# Patient Record
Sex: MALE | Race: WHITE | Employment: OTHER | URBAN - NONMETROPOLITAN AREA
[De-identification: names, ages, dates, MRNs, and addresses within clinical notes are randomized per-mention and may not be internally consistent; named-entity substitution may affect disease eponyms.]

---

## 2019-01-24 ENCOUNTER — APPOINTMENT (OUTPATIENT)
Dept: GENERAL RADIOLOGY | Age: 54
End: 2019-01-24
Payer: MEDICARE

## 2019-01-24 ENCOUNTER — HOSPITAL ENCOUNTER (EMERGENCY)
Age: 54
Discharge: HOME | End: 2019-01-24
Payer: MEDICARE

## 2019-01-24 VITALS
DIASTOLIC BLOOD PRESSURE: 92 MMHG | SYSTOLIC BLOOD PRESSURE: 204 MMHG | BODY MASS INDEX: 37.94 KG/M2 | TEMPERATURE: 97.4 F | RESPIRATION RATE: 16 BRPM | HEART RATE: 66 BPM | WEIGHT: 271 LBS | HEIGHT: 71 IN | OXYGEN SATURATION: 98 %
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
## 2019-01-24 DIAGNOSIS — S80.02XA CONTUSION OF LEFT KNEE, INITIAL ENCOUNTER: Primary | ICD-10-CM

## 2019-01-24 DIAGNOSIS — S81.012A LACERATION OF LEFT KNEE, INITIAL ENCOUNTER: ICD-10-CM

## 2019-01-24 DIAGNOSIS — S39.012A BACK STRAIN, INITIAL ENCOUNTER: ICD-10-CM

## 2019-01-24 PROCEDURE — 6360000002 HC RX W HCPCS: Performed by: PHYSICIAN ASSISTANT

## 2019-01-24 PROCEDURE — 90715 TDAP VACCINE 7 YRS/> IM: CPT | Performed by: PHYSICIAN ASSISTANT

## 2019-01-24 PROCEDURE — 99283 EMERGENCY DEPT VISIT LOW MDM: CPT

## 2019-01-24 PROCEDURE — 12001 RPR S/N/AX/GEN/TRNK 2.5CM/<: CPT

## 2019-01-24 PROCEDURE — 90471 IMMUNIZATION ADMIN: CPT | Performed by: PHYSICIAN ASSISTANT

## 2019-01-24 PROCEDURE — 73564 X-RAY EXAM KNEE 4 OR MORE: CPT

## 2019-01-24 RX ORDER — ORPHENADRINE CITRATE 100 MG/1
100 TABLET, EXTENDED RELEASE ORAL 2 TIMES DAILY
Qty: 14 TABLET | Refills: 0 | Status: SHIPPED | OUTPATIENT
Start: 2019-01-24 | End: 2021-07-30

## 2019-01-24 RX ORDER — LIDOCAINE HYDROCHLORIDE 10 MG/ML
INJECTION, SOLUTION INFILTRATION; PERINEURAL
Status: DISCONTINUED
Start: 2019-01-24 | End: 2019-01-24 | Stop reason: HOSPADM

## 2019-01-24 RX ORDER — GINSENG 100 MG
CAPSULE ORAL
Status: DISCONTINUED
Start: 2019-01-24 | End: 2019-01-24 | Stop reason: HOSPADM

## 2019-01-24 RX ADMIN — TETANUS TOXOID, REDUCED DIPHTHERIA TOXOID AND ACELLULAR PERTUSSIS VACCINE, ADSORBED 0.5 ML: 5; 2.5; 8; 8; 2.5 SUSPENSION INTRAMUSCULAR at 16:50

## 2019-01-24 ASSESSMENT — ENCOUNTER SYMPTOMS
ABDOMINAL PAIN: 0
WHEEZING: 0
EYE REDNESS: 0
VOMITING: 0
NAUSEA: 0
SORE THROAT: 0
SHORTNESS OF BREATH: 0
EYE DISCHARGE: 0
COUGH: 0
RHINORRHEA: 0
BACK PAIN: 1
DIARRHEA: 0

## 2019-01-24 ASSESSMENT — PAIN DESCRIPTION - LOCATION: LOCATION: BACK;KNEE

## 2019-01-24 ASSESSMENT — PAIN SCALES - GENERAL: PAINLEVEL_OUTOF10: 9

## 2019-01-24 ASSESSMENT — PAIN DESCRIPTION - ORIENTATION: ORIENTATION: LEFT

## 2019-01-24 ASSESSMENT — PAIN DESCRIPTION - PAIN TYPE: TYPE: ACUTE PAIN

## 2021-07-29 ENCOUNTER — HOSPITAL ENCOUNTER (EMERGENCY)
Age: 56
Discharge: HOME | DRG: 246 | End: 2021-07-30
Attending: EMERGENCY MEDICINE
Payer: MEDICARE

## 2021-07-29 DIAGNOSIS — K21.9 GASTROESOPHAGEAL REFLUX DISEASE, UNSPECIFIED WHETHER ESOPHAGITIS PRESENT: Primary | ICD-10-CM

## 2021-07-29 DIAGNOSIS — I16.0 HYPERTENSIVE URGENCY: ICD-10-CM

## 2021-07-29 PROBLEM — I10 ESSENTIAL HYPERTENSION: Status: ACTIVE | Noted: 2021-07-29

## 2021-07-29 PROBLEM — R12 HEARTBURN: Status: ACTIVE | Noted: 2021-07-29

## 2021-07-29 LAB
ALBUMIN SERPL-MCNC: 4.1 G/DL (ref 3.5–5.1)
ALP BLD-CCNC: 103 U/L (ref 38–126)
ALT SERPL-CCNC: 10 U/L (ref 11–66)
ANION GAP SERPL CALCULATED.3IONS-SCNC: 11 MEQ/L (ref 8–16)
AST SERPL-CCNC: 10 U/L (ref 5–40)
BASOPHILS # BLD: 0.6 %
BASOPHILS ABSOLUTE: 0.1 THOU/MM3 (ref 0–0.1)
BILIRUB SERPL-MCNC: 0.4 MG/DL (ref 0.3–1.2)
BILIRUBIN DIRECT: < 0.2 MG/DL (ref 0–0.3)
BUN BLDV-MCNC: 30 MG/DL (ref 7–22)
CALCIUM SERPL-MCNC: 9.3 MG/DL (ref 8.5–10.5)
CHLORIDE BLD-SCNC: 106 MEQ/L (ref 98–111)
CO2: 25 MEQ/L (ref 23–33)
CREAT SERPL-MCNC: 1 MG/DL (ref 0.4–1.2)
EOSINOPHIL # BLD: 7 %
EOSINOPHILS ABSOLUTE: 0.7 THOU/MM3 (ref 0–0.4)
ERYTHROCYTE [DISTWIDTH] IN BLOOD BY AUTOMATED COUNT: 13.3 % (ref 11.5–14.5)
ERYTHROCYTE [DISTWIDTH] IN BLOOD BY AUTOMATED COUNT: 43.8 FL (ref 35–45)
GFR SERPL CREATININE-BSD FRML MDRD: 77 ML/MIN/1.73M2
GLUCOSE BLD-MCNC: 166 MG/DL (ref 70–108)
HCT VFR BLD CALC: 44.2 % (ref 42–52)
HEMOGLOBIN: 14.5 GM/DL (ref 14–18)
IMMATURE GRANS (ABS): 0.03 THOU/MM3 (ref 0–0.07)
IMMATURE GRANULOCYTES: 0.3 %
LIPASE: 38 U/L (ref 5.6–51.3)
LYMPHOCYTES # BLD: 18.2 %
LYMPHOCYTES ABSOLUTE: 1.7 THOU/MM3 (ref 1–4.8)
MCH RBC QN AUTO: 29.9 PG (ref 26–33)
MCHC RBC AUTO-ENTMCNC: 32.8 GM/DL (ref 32.2–35.5)
MCV RBC AUTO: 91.1 FL (ref 80–94)
MONOCYTES # BLD: 6.7 %
MONOCYTES ABSOLUTE: 0.6 THOU/MM3 (ref 0.4–1.3)
NUCLEATED RED BLOOD CELLS: 0 /100 WBC
PLATELET # BLD: 243 THOU/MM3 (ref 130–400)
PMV BLD AUTO: 9.9 FL (ref 9.4–12.4)
POTASSIUM REFLEX MAGNESIUM: 4.4 MEQ/L (ref 3.5–5.2)
PRO-BNP: 182.5 PG/ML (ref 0–900)
RBC # BLD: 4.85 MILL/MM3 (ref 4.7–6.1)
SEG NEUTROPHILS: 67.2 %
SEGMENTED NEUTROPHILS ABSOLUTE COUNT: 6.5 THOU/MM3 (ref 1.8–7.7)
SODIUM BLD-SCNC: 142 MEQ/L (ref 135–145)
TOTAL PROTEIN: 6.5 G/DL (ref 6.1–8)
TROPONIN T: < 0.01 NG/ML
TROPONIN T: < 0.01 NG/ML
WBC # BLD: 9.6 THOU/MM3 (ref 4.8–10.8)

## 2021-07-29 PROCEDURE — 99283 EMERGENCY DEPT VISIT LOW MDM: CPT

## 2021-07-29 PROCEDURE — 2500000003 HC RX 250 WO HCPCS: Performed by: STUDENT IN AN ORGANIZED HEALTH CARE EDUCATION/TRAINING PROGRAM

## 2021-07-29 PROCEDURE — 93005 ELECTROCARDIOGRAM TRACING: CPT | Performed by: STUDENT IN AN ORGANIZED HEALTH CARE EDUCATION/TRAINING PROGRAM

## 2021-07-29 PROCEDURE — 2500000003 HC RX 250 WO HCPCS

## 2021-07-29 PROCEDURE — 83690 ASSAY OF LIPASE: CPT

## 2021-07-29 PROCEDURE — 6370000000 HC RX 637 (ALT 250 FOR IP): Performed by: STUDENT IN AN ORGANIZED HEALTH CARE EDUCATION/TRAINING PROGRAM

## 2021-07-29 PROCEDURE — 96376 TX/PRO/DX INJ SAME DRUG ADON: CPT

## 2021-07-29 PROCEDURE — 85025 COMPLETE CBC W/AUTO DIFF WBC: CPT

## 2021-07-29 PROCEDURE — 96365 THER/PROPH/DIAG IV INF INIT: CPT

## 2021-07-29 PROCEDURE — 96375 TX/PRO/DX INJ NEW DRUG ADDON: CPT

## 2021-07-29 PROCEDURE — 80048 BASIC METABOLIC PNL TOTAL CA: CPT

## 2021-07-29 PROCEDURE — 83880 ASSAY OF NATRIURETIC PEPTIDE: CPT

## 2021-07-29 PROCEDURE — 84484 ASSAY OF TROPONIN QUANT: CPT

## 2021-07-29 PROCEDURE — 36415 COLL VENOUS BLD VENIPUNCTURE: CPT

## 2021-07-29 PROCEDURE — 80076 HEPATIC FUNCTION PANEL: CPT

## 2021-07-29 RX ORDER — SUCRALFATE 1 G/1
1 TABLET ORAL 4 TIMES DAILY
Qty: 120 TABLET | Refills: 0 | Status: SHIPPED | OUTPATIENT
Start: 2021-07-29 | End: 2021-09-13

## 2021-07-29 RX ORDER — BLOOD PRESSURE TEST KIT
KIT MISCELLANEOUS
Qty: 1 KIT | Refills: 0 | Status: SHIPPED | OUTPATIENT
Start: 2021-07-29

## 2021-07-29 RX ORDER — FAMOTIDINE 20 MG/1
20 TABLET, FILM COATED ORAL ONCE
Status: COMPLETED | OUTPATIENT
Start: 2021-07-29 | End: 2021-07-29

## 2021-07-29 RX ORDER — LABETALOL 20 MG/4 ML (5 MG/ML) INTRAVENOUS SYRINGE
Status: COMPLETED
Start: 2021-07-29 | End: 2021-07-29

## 2021-07-29 RX ORDER — FAMOTIDINE 20 MG/1
20 TABLET, FILM COATED ORAL 2 TIMES DAILY PRN
Qty: 60 TABLET | Refills: 0 | Status: ON HOLD | OUTPATIENT
Start: 2021-07-29 | End: 2021-08-10 | Stop reason: SDUPTHER

## 2021-07-29 RX ORDER — OMEPRAZOLE 40 MG/1
40 CAPSULE, DELAYED RELEASE ORAL
Qty: 30 CAPSULE | Refills: 0 | Status: SHIPPED | OUTPATIENT
Start: 2021-07-29 | End: 2021-07-30

## 2021-07-29 RX ORDER — NITROGLYCERIN 20 MG/100ML
5-200 INJECTION INTRAVENOUS CONTINUOUS
Status: DISCONTINUED | OUTPATIENT
Start: 2021-07-29 | End: 2021-07-29

## 2021-07-29 RX ORDER — LABETALOL 20 MG/4 ML (5 MG/ML) INTRAVENOUS SYRINGE
10 ONCE
Status: COMPLETED | OUTPATIENT
Start: 2021-07-29 | End: 2021-07-29

## 2021-07-29 RX ORDER — LABETALOL 20 MG/4 ML (5 MG/ML) INTRAVENOUS SYRINGE
20 ONCE
Status: COMPLETED | OUTPATIENT
Start: 2021-07-30 | End: 2021-07-29

## 2021-07-29 RX ADMIN — NITROGLYCERIN 5 MCG/MIN: 20 INJECTION INTRAVENOUS at 19:53

## 2021-07-29 RX ADMIN — LABETALOL 20 MG/4 ML (5 MG/ML) INTRAVENOUS SYRINGE 10 MG: at 21:33

## 2021-07-29 RX ADMIN — FAMOTIDINE 20 MG: 20 TABLET, FILM COATED ORAL at 22:06

## 2021-07-29 RX ADMIN — LABETALOL 20 MG/4 ML (5 MG/ML) INTRAVENOUS SYRINGE 20 MG: at 23:57

## 2021-07-29 RX ADMIN — LIDOCAINE HYDROCHLORIDE: 20 SOLUTION ORAL; TOPICAL at 19:53

## 2021-07-29 ASSESSMENT — ENCOUNTER SYMPTOMS
ABDOMINAL PAIN: 1
SHORTNESS OF BREATH: 0
EYE PAIN: 0
APNEA: 0
RECTAL PAIN: 0
CHOKING: 0
CHEST TIGHTNESS: 1
PHOTOPHOBIA: 0
BLOOD IN STOOL: 0
SINUS PAIN: 0
SINUS PRESSURE: 0
COUGH: 0
ANAL BLEEDING: 0
FACIAL SWELLING: 0
EYE DISCHARGE: 0
EYE REDNESS: 0

## 2021-07-29 NOTE — ED PROVIDER NOTES
[address] [address]          Pt Name: Kelley Bloom  MRN: [id number]  [place] 1965  Date of evaluation: 7/29/2021  Treating Resident Physician: Constantino Knapp MD  Supervising Physician: Yakelin Montano MD    [address] [place] [address]       Chief Complaint   Patient presents with    Heartburn     History obtained from the patient. HISTORY OF PRESENT ILLNESS    HPI  Kelley Bloom is a 60 y.o. male who presents to the emergency department for evaluation of heartburn for the past 3 weeks but has gotten worse in the past 2 days. He states the pain gets worse after eating and when he lays down in bed. The pain is temporarily alleviated by rolaids, tums and omeprazole. He also complains of left-sided elbow pain for the past 2 days he describes the pain as sharp and denies trauma to the area. Patient's initial blood pressure in the ED is 209/104. He states that he took his antihypertensive medication today. The patient has no other acute complaints at this time. REVIEW OF SYSTEMS   Review of Systems   Constitutional: Negative for activity change, chills, diaphoresis, fatigue and fever. HENT: Negative for congestion, ear discharge, ear pain, facial swelling, hearing loss, sinus pressure and sinus pain. Eyes: Negative for photophobia, pain, discharge and redness. Respiratory: Positive for chest tightness. Negative for apnea, cough, choking and shortness of breath. Cardiovascular: Positive for chest pain. Negative for leg swelling. Gastrointestinal: Positive for abdominal pain. Negative for anal bleeding, blood in stool and rectal pain. Endocrine: Negative for polydipsia, polyphagia and polyuria. Genitourinary: Negative for dysuria, flank pain, genital sores and hematuria. Musculoskeletal: Negative for gait problem, joint swelling, myalgias, neck pain and neck stiffness. Skin: Negative for pallor, rash and wound.    Neurological: Negative for tremors, seizures, syncope, facial asymmetry and headaches. Hematological: Negative for adenopathy. Psychiatric/Behavioral: Negative for agitation, behavioral problems, hallucinations, self-injury and suicidal ideas. PAST MEDICAL AND SURGICAL HISTORY     Past Medical History:   Diagnosis Date    Diabetes mellitus ([address]     Hypertension     Pneumonia     Unspecified cerebral artery occlusion with cerebral infarction      Past Surgical History:   Procedure Laterality Date    ABDOMEN SURGERY      FEMUR FRACTURE SURGERY Left     FRACTURE SURGERY      SKIN GRAFT      TIBIA FRACTURE SURGERY Right     TOE AMPUTATION Left     2,3,4,5 TOES    VENA CAVA FILTER PLACEMENT         MEDICATIONS   No current facility-administered medications for this encounter. Current Outpatient Medications:     orphenadrine (NORFLEX) 100 MG extended release tablet, Take 1 tablet by mouth 2 times daily, Disp: 14 tablet, Rfl: 0    ibuprofen (ADVIL;MOTRIN) 800 MG tablet, Take 800 mg by mouth every 12 hours. Twice per day, Disp: , Rfl:     metFORMIN (GLUCOPHAGE) 500 MG tablet, Take 500 mg by mouth 2 times daily (with meals). , Disp: , Rfl:     glimepiride (AMARYL) 2 MG tablet, Take 2 mg by mouth every morning (before breakfast). , Disp: , Rfl:     clopidogrel (PLAVIX) 75 MG tablet, Take 75 mg by mouth daily. , Disp: , Rfl:     atenolol (TENORMIN) 50 MG tablet, Take 50 mg by mouth daily. , Disp: , Rfl:       SOCIAL HISTORY     Social History     Social History Narrative    Not on file     Social History     Tobacco Use    Smoking status: Former Smoker     Quit date: 2013     Years since quittin.9   Substance Use Topics    Alcohol use: No    Drug use: Not on file         ALLERGIES     Allergies   Allergen Reactions    Bactrim [Sulfamethoxazole-Trimethoprim] Swelling    Lasix [Furosemide] Other (See Comments)         FAMILY HISTORY     Family History   Problem Relation Age of Onset    Cancer Mother     Cancer There is no guarding or rebound. Hernia: No hernia is present. Musculoskeletal:         General: No swelling, deformity or signs of injury. Normal range of motion. Left elbow: Tenderness present. Cervical back: Normal range of motion and neck supple. No rigidity or tenderness. Right lower leg: No edema. Left lower leg: No edema. Lymphadenopathy:      Cervical: No cervical adenopathy. Skin:     General: Skin is warm and dry. Capillary Refill: Capillary refill takes less than 2 seconds. Coloration: Skin is not jaundiced. Findings: No bruising, erythema, lesion or rash. Comments: Surgical scars noted on the abdomen and bilateral arms and legs from a previous accident. Neurological:      General: No focal deficit present. Mental Status: He is alert and oriented to person, place, and time. Motor: No weakness. Psychiatric:         Mood and Affect: Mood normal.         Behavior: Behavior normal.         Thought Content: Thought content normal.       MEDICAL DECISION MAKING   Initial Assessment:   1.  Heartburn    Differential diagnosis includes but is not limited to GERD, ACS/MI, hypertensive urgency/emergency, PE    Plan:    EKG   Troponin, BNP   lipase   GI cocktail   Nitroglycerin   CBC, BMP, hepatic fxn     ED RESULTS   Laboratory results:  Labs Reviewed   CBC WITH AUTO DIFFERENTIAL - Abnormal; Notable for the following components:       Result Value    Eosinophils Absolute 0.7 (*)     All other components within normal limits   BASIC METABOLIC PANEL W/ REFLEX TO MG FOR LOW K - Abnormal; Notable for the following components:    Glucose 166 (*)     BUN 30 (*)     All other components within normal limits   HEPATIC FUNCTION PANEL - Abnormal; Notable for the following components:    ALT 10 (*)     All other components within normal limits   GLOMERULAR FILTRATION RATE, ESTIMATED - Abnormal; Notable for the following components:    Est, Glom Filt Rate 77 (*)     All other components within normal limits   LIPASE   BRAIN NATRIURETIC PEPTIDE   TROPONIN   ANION GAP       Radiologic studies results:  No orders to display       ED Medications administered this visit:   Medications   aluminum & magnesium hydroxide-simethicone (MAALOX) 30 mL, lidocaine viscous hcl (XYLOCAINE) 5 mL (GI COCKTAIL) ( Oral Given 7/29/21 1953)       ED COURSE     ED Course as of Jul 29 2102   Thu Jul 29, 2021 2001 BP(!): 214/98 [EL]   2014 BP(!): 182/94 [EL]   2044 BP(!): 180/87 [EL]   2101 Patient signed out to Dr. Syl Winn    [EL]      ED Course User Index  [EL] Ras Astudillo MD       MEDICATION CHANGES     New Prescriptions    No medications on file         FINAL DISPOSITION     Final diagnoses:   Heartburn   Essential hypertension     Condition: condition: stable  Dispo: patient signed out to Dr. Sly Winn      This transcription was electronically signed. Parts of this transcriptions may have been dictated by use of voice recognition software and electronically transcribed, and parts may have been transcribed with the assistance of an ED scribe. The transcription may contain errors not detected in proofreading. Please refer to my supervising physician's documentation if my documentation differs.     Electronically Signed: Umm Araiza MD, 07/29/21, 9:02 PM         Ras Astudillo MD  Resident  07/29/21 1924

## 2021-07-29 NOTE — ED NOTES
Upon first contact with patient this RN receives bedside shift report Forest JACKMAN.        Bret Hargrove RN  07/29/21 4174

## 2021-07-30 ENCOUNTER — OFFICE VISIT (OUTPATIENT)
Dept: CARDIOLOGY CLINIC | Age: 56
End: 2021-07-30
Payer: MEDICARE

## 2021-07-30 VITALS
HEART RATE: 64 BPM | DIASTOLIC BLOOD PRESSURE: 92 MMHG | BODY MASS INDEX: 33.93 KG/M2 | SYSTOLIC BLOOD PRESSURE: 191 MMHG | WEIGHT: 237 LBS | HEIGHT: 70 IN

## 2021-07-30 VITALS
DIASTOLIC BLOOD PRESSURE: 86 MMHG | RESPIRATION RATE: 14 BRPM | HEART RATE: 64 BPM | OXYGEN SATURATION: 95 % | TEMPERATURE: 97.9 F | SYSTOLIC BLOOD PRESSURE: 175 MMHG

## 2021-07-30 DIAGNOSIS — R07.2 PRECORDIAL PAIN: Primary | ICD-10-CM

## 2021-07-30 DIAGNOSIS — R06.02 SHORTNESS OF BREATH: ICD-10-CM

## 2021-07-30 DIAGNOSIS — I10 ESSENTIAL HYPERTENSION: ICD-10-CM

## 2021-07-30 DIAGNOSIS — E78.01 FAMILIAL HYPERCHOLESTEROLEMIA: ICD-10-CM

## 2021-07-30 PROCEDURE — 99204 OFFICE O/P NEW MOD 45 MIN: CPT | Performed by: NUCLEAR MEDICINE

## 2021-07-30 RX ORDER — LEVOFLOXACIN 750 MG/1
TABLET ORAL
Status: ON HOLD | COMMUNITY
Start: 2021-05-11 | End: 2021-08-02 | Stop reason: ALTCHOICE

## 2021-07-30 RX ORDER — PANTOPRAZOLE SODIUM 40 MG/1
40 TABLET, DELAYED RELEASE ORAL 2 TIMES DAILY
COMMUNITY
End: 2021-07-30 | Stop reason: SDUPTHER

## 2021-07-30 RX ORDER — PANTOPRAZOLE SODIUM 40 MG/1
40 TABLET, DELAYED RELEASE ORAL 2 TIMES DAILY
Qty: 180 TABLET | Refills: 1 | Status: SHIPPED | OUTPATIENT
Start: 2021-07-30 | End: 2022-04-08

## 2021-07-30 RX ORDER — LOSARTAN POTASSIUM 50 MG/1
50 TABLET ORAL DAILY
COMMUNITY
End: 2021-07-30 | Stop reason: SDUPTHER

## 2021-07-30 RX ORDER — LOSARTAN POTASSIUM 50 MG/1
50 TABLET ORAL DAILY
Qty: 90 TABLET | Refills: 1 | Status: ON HOLD | OUTPATIENT
Start: 2021-07-30 | End: 2021-08-04 | Stop reason: HOSPADM

## 2021-07-30 ASSESSMENT — ENCOUNTER SYMPTOMS
RECTAL PAIN: 0
CONSTIPATION: 0
ABDOMINAL DISTENTION: 0
ABDOMINAL PAIN: 0
SHORTNESS OF BREATH: 1
BACK PAIN: 0
CHEST TIGHTNESS: 1
VOMITING: 0
BLOOD IN STOOL: 0
DIARRHEA: 0
COLOR CHANGE: 0
ANAL BLEEDING: 0
PHOTOPHOBIA: 0
NAUSEA: 0

## 2021-07-30 NOTE — ED PROVIDER NOTES
Signed out to me at shift change. This patient has been seen and evaluated by previous shift physician, Dr. Katherine Abrams. Please refer to his/her note for detailed history of present illness, physical exam and medical decision making. Signed out time 9:09 PM. I was signed out to follow up Labs and disposition. I have personally seen and evaluated this patient at time of sign-out. Pt comes to ED with HTN and \"heart burn\". Exam: Hypertensive, otherwise unremarkable. Troponin negative x2 and Repeat EKG normal.   Labs are reassuring. No findings suggesting hypertensive emergency. Discharged with cardiology follow-up appointment set up.     VITALS  Vitals:    07/29/21 2012 07/29/21 2038 07/29/21 2136 07/29/21 2310   BP: (!) 182/94 (!) 180/87 (!) 165/95 (!) 168/92   Pulse: 64 64 66 64   Resp: 16 15 15 14   Temp:       SpO2: 96% 96% 98% 95%         LABS  Results for orders placed or performed during the hospital encounter of 07/29/21   CBC Auto Differential   Result Value Ref Range    WBC 9.6 4.8 - 10.8 thou/mm3    RBC 4.85 4.70 - 6.10 mill/mm3    Hemoglobin 14.5 14.0 - 18.0 gm/dl    Hematocrit 44.2 42.0 - 52.0 %    MCV 91.1 80.0 - 94.0 fL    MCH 29.9 26.0 - 33.0 pg    MCHC 32.8 32.2 - 35.5 gm/dl    RDW-CV 13.3 11.5 - 14.5 %    RDW-SD 43.8 35.0 - 45.0 fL    Platelets 424 225 - 479 thou/mm3    MPV 9.9 9.4 - 12.4 fL    Seg Neutrophils 67.2 %    Lymphocytes 18.2 %    Monocytes 6.7 %    Eosinophils 7.0 %    Basophils 0.6 %    Immature Granulocytes 0.3 %    Segs Absolute 6.5 1 - 7 thou/mm3    Lymphocytes Absolute 1.7 1.0 - 4.8 thou/mm3    Monocytes Absolute 0.6 0.4 - 1.3 thou/mm3    Eosinophils Absolute 0.7 (H) 0.0 - 0.4 thou/mm3    Basophils Absolute 0.1 0.0 - 0.1 thou/mm3    Immature Grans (Abs) 0.03 0.00 - 0.07 thou/mm3    nRBC 0 /100 wbc   Basic Metabolic Panel w/ Reflex to MG   Result Value Ref Range    Sodium 142 135 - 145 meq/L    Potassium reflex Magnesium 4.4 3.5 - 5.2 meq/L    Chloride 106 98 - 111 meq/L CO2 25 23 - 33 meq/L    Glucose 166 (H) 70 - 108 mg/dL    BUN 30 (H) 7 - 22 mg/dL    CREATININE 1.0 0.4 - 1.2 mg/dL    Calcium 9.3 8.5 - 10.5 mg/dL   Hepatic Function Panel   Result Value Ref Range    Albumin 4.1 3.5 - 5.1 g/dL    Total Bilirubin 0.4 0.3 - 1.2 mg/dL    Bilirubin, Direct <0.2 0.0 - 0.3 mg/dL    Alkaline Phosphatase 103 38 - 126 U/L    AST 10 5 - 40 U/L    ALT 10 (L) 11 - 66 U/L    Total Protein 6.5 6.1 - 8.0 g/dL   Lipase   Result Value Ref Range    Lipase 38.0 5.6 - 51.3 U/L   Brain Natriuretic Peptide   Result Value Ref Range    Pro-.5 0.0 - 900.0 pg/mL   Troponin   Result Value Ref Range    Troponin T < 0.010 ng/ml   Anion Gap   Result Value Ref Range    Anion Gap 11.0 8.0 - 16.0 meq/L   Glomerular Filtration Rate, Estimated   Result Value Ref Range    Est, Glom Filt Rate 77 (A) ml/min/1.73m2   Troponin   Result Value Ref Range    Troponin T < 0.010 ng/ml   EKG 12 Lead   Result Value Ref Range    Ventricular Rate 69 BPM    Atrial Rate 69 BPM    P-R Interval 176 ms    QRS Duration 94 ms    Q-T Interval 370 ms    QTc Calculation (Bazett) 396 ms    P Axis 59 degrees    R Axis 67 degrees    T Axis 50 degrees   EKG 12 Lead   Result Value Ref Range    Ventricular Rate 62 BPM    Atrial Rate 62 BPM    P-R Interval 174 ms    QRS Duration 94 ms    Q-T Interval 380 ms    QTc Calculation (Bazett) 385 ms    P Axis 35 degrees    R Axis 57 degrees    T Axis 32 degrees         RADIOLOGY  No orders to display         ED MEDICATIONS  Medications   aluminum & magnesium hydroxide-simethicone (MAALOX) 30 mL, lidocaine viscous hcl (XYLOCAINE) 5 mL (GI COCKTAIL) ( Oral Given 7/29/21 1953)   labetalol (NORMODYNE;TRANDATE) injection syringe 10 mg (10 mg Intravenous Given 7/29/21 2133)   famotidine (PEPCID) tablet 20 mg (20 mg Oral Given 7/29/21 2206)         DIAGNOSIS  1. Gastroesophageal reflux disease, unspecified whether esophagitis present    2.  Hypertensive urgency          DISPOSITION and PLAN  Home    MEDICATIONS ON DISCHARGE  New Prescriptions    BLOOD PRESSURE MONITOR KIT    Use once daily    FAMOTIDINE (PEPCID) 20 MG TABLET    Take 1 tablet by mouth 2 times daily as needed (heartburn, indigestion)    OMEPRAZOLE (PRILOSEC) 40 MG DELAYED RELEASE CAPSULE    Take 1 capsule by mouth every morning (before breakfast) Take daily, 30-45 minutes prior to eating anything.     SUCRALFATE (CARAFATE) 1 GM TABLET    Take 1 tablet by mouth 4 times daily         Chon Casiano M.D.       Chon Casiano MD  07/29/21 6860

## 2021-07-30 NOTE — PROGRESS NOTES
[id number] [facility] .  [address] [address]  Dept: [phone]  Dept Fax: [phone]  Loc: [phone]    Visit Date: 2021    Bucky Wakefield is a 60 y.o. male who presents todayfor:  Chief Complaint   Patient presents with    Follow-Up from Hospital    Hypertension    Diabetes    Hyperlipidemia     Was in the ER for heart burn   Worse with eating   Better with tums  Described as a heart burn   Arm pains as well  Above elbow   Mostly left  Does have more dyspnea  More than usual   Exertional in nature  No known CAD  Know DM for many years   Not the best controlled   Known HTN for many years   Seems to be higher lately   Used to smoke a while back   Does have family history of CAD      HPI:  HPI  Past Medical History:   Diagnosis Date    Diabetes mellitus ([address]     Hypertension     Pneumonia     Unspecified cerebral artery occlusion with cerebral infarction       Past Surgical History:   Procedure Laterality Date    ABDOMEN SURGERY      FEMUR FRACTURE SURGERY Left     FRACTURE SURGERY      SKIN GRAFT      TIBIA FRACTURE SURGERY Right     TOE AMPUTATION Left     2,3,4,5 TOES    VENA CAVA FILTER PLACEMENT       Family History   Problem Relation Age of Onset    Cancer Mother     Cancer Maternal Aunt     Cancer Maternal Grandmother      Social History     Tobacco Use    Smoking status: Former Smoker     Quit date: 2013     Years since quittin.9   Substance Use Topics    Alcohol use: No      Current Outpatient Medications   Medication Sig Dispense Refill    levoFLOXacin (LEVAQUIN) 750 MG tablet TAKE ONE TABLET EACH DAY FOR 10 DAYS TO TREAT INFECTION      omeprazole (PRILOSEC) 40 MG delayed release capsule Take 1 capsule by mouth every morning (before breakfast) Take daily, 30-45 minutes prior to eating anything.  30 capsule 0    famotidine (PEPCID) 20 MG tablet Take 1 tablet by mouth 2 times daily as needed (heartburn, indigestion) 60 tablet 0    sucralfate (CARAFATE) 1 GM tablet Take 1 tablet by mouth 4 times daily 120 tablet 0    Blood Pressure Monitor KIT Use once daily 1 kit 0    ibuprofen (ADVIL;MOTRIN) 800 MG tablet Take 800 mg by mouth every 12 hours. Twice per day      metFORMIN (GLUCOPHAGE) 500 MG tablet Take 500 mg by mouth 2 times daily (with meals).  glimepiride (AMARYL) 2 MG tablet Take 2 mg by mouth every morning (before breakfast).  clopidogrel (PLAVIX) 75 MG tablet Take 75 mg by mouth daily.  atenolol (TENORMIN) 50 MG tablet Take 50 mg by mouth daily. No current facility-administered medications for this visit. Allergies   Allergen Reactions    Bactrim [Sulfamethoxazole-Trimethoprim] Swelling    Lasix [Furosemide] Other (See Comments)     Health Maintenance   Topic Date Due    Hepatitis C screen  Never done    Pneumococcal 0-64 years Vaccine (1 of 2 - PPSV23) Never done    Diabetic foot exam  Never done    A1C test (Diabetic or Prediabetic)  Never done    Diabetic retinal exam  Never done    Lipid screen  Never done    COVID-19 Vaccine (1) Never done    HIV screen  Never done    Diabetic microalbuminuria test  Never done    Hepatitis B vaccine (1 of 3 - Risk 3-dose series) Never done    Colon cancer screen colonoscopy  Never done    Shingles Vaccine (1 of 2) Never done    Annual Wellness Visit (AWV)  Never done    Flu vaccine (1) 09/01/2021    Potassium monitoring  07/29/2022    Creatinine monitoring  07/29/2022    DTaP/Tdap/Td vaccine (2 - Td or Tdap) 01/24/2029    Hepatitis A vaccine  Aged Out    Hib vaccine  Aged Out    Meningococcal (ACWY) vaccine  Aged Out       Subjective:  Review of Systems   Constitutional: Positive for fatigue. HENT: Negative for ear pain and mouth sores. Eyes: Negative for photophobia. Respiratory: Positive for chest tightness and shortness of breath. Cardiovascular: Positive for chest pain.  Negative for palpitations. Gastrointestinal: Negative for abdominal distention, abdominal pain, anal bleeding, blood in stool, constipation, diarrhea, nausea, rectal pain and vomiting. Endocrine: Negative for polyphagia. Genitourinary: Negative for dysuria, frequency and urgency. Musculoskeletal: Negative for arthralgias, back pain, gait problem, joint swelling, myalgias, neck pain and neck stiffness. Skin: Negative for color change, pallor, rash and wound. Allergic/Immunologic: Negative for food allergies. Neurological: Negative for dizziness, syncope and light-headedness. Psychiatric/Behavioral: Negative for confusion, decreased concentration, dysphoric mood and hallucinations. The patient is not nervous/anxious and is not hyperactive. Objective:  Physical Exam  HENT:      Head: Normocephalic. Right Ear: Tympanic membrane normal.      Nose: Nose normal.      Mouth/Throat:      Mouth: Mucous membranes are moist.   Eyes:      Pupils: Pupils are equal, round, and reactive to light. Cardiovascular:      Rate and Rhythm: Normal rate. Heart sounds: Murmur heard. No gallop. Pulmonary:      Effort: No respiratory distress. Breath sounds: No stridor. No wheezing, rhonchi or rales. Chest:      Chest wall: No tenderness. Abdominal:      General: There is no distension. Palpations: There is no mass. Tenderness: There is no abdominal tenderness. There is no right CVA tenderness, left CVA tenderness, guarding or rebound. Hernia: No hernia is present. Musculoskeletal:         General: No swelling, tenderness, deformity or signs of injury. Cervical back: Normal range of motion. Right lower leg: No edema. Left lower leg: No edema. Skin:     Coloration: Skin is not jaundiced or pale. Findings: No bruising, erythema, lesion or rash. Neurological:      Mental Status: He is alert and oriented to person, place, and time.       Cranial Nerves: No cranial nerve deficit. Sensory: No sensory deficit. Motor: No weakness. Gait: Gait normal.      Deep Tendon Reflexes: Reflexes normal.   Psychiatric:         Mood and Affect: Mood normal.         Thought Content: Thought content normal.       BP (!) 191/92   Pulse 64   Ht 5' 10\" (1.778 m)   Wt 237 lb (107.5 kg)   BMI 34.01 kg/m²     Assessment:      Diagnosis Orders   1. Precordial pain     2. Essential hypertension     3. Familial hypercholesterolemia     4. Shortness of breath     as above  Possible angina   Vs GERD   Sounds like GERD than angina but a very high risk patient  Normal ECG   High     Plan:  No follow-ups on file. Start protonix 40 bid  Add carafate  Add losartan   Non invasive cardiac testing will be ordered to further evaluate for any ischemic or structural heart disease as a cause of the patient symptoms. We will proceed with a Stress Cardiolite test and echo soon. Continue risk factor modification and medical management. Thank you for allowing me to participate in the care of your patient. Please don't hesitate to contact me regarding any further issues related to the patient care      Orders Placed:  No orders of the defined types were placed in this encounter. Medications Prescribed:  No orders of the defined types were placed in this encounter. Discussed use, benefit, and side effects of prescribed medications. All patient questions answered. Pt voicedunderstanding. Instructed to continue current medications, diet and exercise. Continue risk factor modification and medical management. Patient agreed with treatment plan. Follow up as directed.     Electronically signedby Ruth Cohen MD on 7/30/2021 at 10:26 AM

## 2021-07-30 NOTE — ED NOTES
Pt updated on POC. Pt resting in bed with family at the bedside. No needs expressed.       Guicho Boswell RN  07/29/21 1001

## 2021-07-30 NOTE — ED PROVIDER NOTES
Transfer of Care Note:   Physician Signing out: Dr. Givens Duty  Receiving Physician: Jayy Cameron MD        Brief history:  59 yo M presents to ED for evaluation of heartburn, was found to have elevated BP in urgency zone despite taking home atenolol as prescribed. Items pending that need to be checked:  Blood pressure recheck. Tentative Impression of patient:  BP improving after dc nitro gtt. Uncontrolled GERD. Expected disposition of patient:  Expected discharge to home once BP better controlled. Additional Assessment and results:   I have personally performed a face to face diagnostic evaluation on this patient. The patient's initial evaluation and plan have been discussed with the prior physician who initially evaluated the patient. Nursing Notes, Past Medical Hx, Past Surgical Hx, Social Hx, Allergies, vital signs and Family Hx were all reviewed. Vitals:    07/30/21 0011   BP: (!) 175/86   Pulse:    Resp:    Temp:    SpO2:      Physical Exam - Pt appears comfortable in bed.        Labs Reviewed   CBC WITH AUTO DIFFERENTIAL - Abnormal; Notable for the following components:       Result Value    Eosinophils Absolute 0.7 (*)     All other components within normal limits   BASIC METABOLIC PANEL W/ REFLEX TO MG FOR LOW K - Abnormal; Notable for the following components:    Glucose 166 (*)     BUN 30 (*)     All other components within normal limits   HEPATIC FUNCTION PANEL - Abnormal; Notable for the following components:    ALT 10 (*)     All other components within normal limits   GLOMERULAR FILTRATION RATE, ESTIMATED - Abnormal; Notable for the following components:    Est, Glom Filt Rate 77 (*)     All other components within normal limits   LIPASE   BRAIN NATRIURETIC PEPTIDE   TROPONIN   ANION GAP   TROPONIN         Medications   aluminum & magnesium hydroxide-simethicone (MAALOX) 30 mL, lidocaine viscous hcl (XYLOCAINE) 5 mL (GI COCKTAIL) ( Oral Given 7/29/21 1953)   labetalol (NORMODYNE;TRANDATE) injection syringe 10 mg (10 mg Intravenous Given 7/29/21 2133)   famotidine (PEPCID) tablet 20 mg (20 mg Oral Given 7/29/21 2206)   labetalol (NORMODYNE;TRANDATE) injection syringe 20 mg (20 mg Intravenous Given 7/29/21 2357)         No orders to display         ED Course as of Jul 30 0021   Thu Jul 29, 2021 2001 BP(!): 214/98 [EL]   2014 BP(!): 182/94 [EL]   2044 BP(!): 180/87 [EL]   2101 Patient signed out to Dr. Mayo Mercedes    [EL]      ED Course User Index  [EL] Ede Robin MD         Further MDM and disposition:   Assessment: hypertensive urgency, GERD. Plan: labetalol IV; pepcid. Repeat trop and EKG - normal. BP improved outside of urgency zone without signs of end-organ damage on repeat evaluation. This was after he was given labetalol 10 mg IV. BP then gradually increased and pt was given labetalol 20 mg IV. BP improved to 175/86. Pt is advised to continue his home atenolol at this time. Will plan to add pepcid, omeprazole (increased dose from what pt reported), and carafate to home regimen for control of GERD. BP med management deferred to cardiology. BP cuff for home use ordered. Appt made for pt to see cardiology tomorrow morning at 10 am. GERD and HTN management counseling performed.      Final diagnoses:   Gastroesophageal reflux disease, unspecified whether esophagitis present   Hypertensive urgency     Discharge Medication List as of 7/29/2021 11:55 PM      START taking these medications    Details   omeprazole (PRILOSEC) 40 MG delayed release capsule Take 1 capsule by mouth every morning (before breakfast) Take daily, 30-45 minutes prior to eating anything., Disp-30 capsule, R-0Normal      famotidine (PEPCID) 20 MG tablet Take 1 tablet by mouth 2 times daily as needed (heartburn, indigestion), Disp-60 tablet, R-0Normal      sucralfate (CARAFATE) 1 GM tablet Take 1 tablet by mouth 4 times daily, Disp-120 tablet, R-0Normal      Blood Pressure Monitor KIT Disp-1 kit, R-0, NormalUse once daily               Condition: condition: stable  Dispo: Discharge to home    This transcription was electronically signed. It was dictated by use of voice recognition software and electronically transcribed. The transcription may contain errors not detected in proofreading.         Ezequiel Oliveria MD  Resident  07/29/21 3251       Ezequiel Oliveira MD  Resident  07/29/21 6883       Ezequiel Oliveira MD  Resident  07/30/21 6495

## 2021-07-31 LAB
EKG ATRIAL RATE: 62 BPM
EKG ATRIAL RATE: 69 BPM
EKG P AXIS: 35 DEGREES
EKG P AXIS: 59 DEGREES
EKG P-R INTERVAL: 174 MS
EKG P-R INTERVAL: 176 MS
EKG Q-T INTERVAL: 370 MS
EKG Q-T INTERVAL: 380 MS
EKG QRS DURATION: 94 MS
EKG QRS DURATION: 94 MS
EKG QTC CALCULATION (BAZETT): 385 MS
EKG QTC CALCULATION (BAZETT): 396 MS
EKG R AXIS: 57 DEGREES
EKG R AXIS: 67 DEGREES
EKG T AXIS: 32 DEGREES
EKG T AXIS: 50 DEGREES
EKG VENTRICULAR RATE: 62 BPM
EKG VENTRICULAR RATE: 69 BPM

## 2021-08-01 ENCOUNTER — HOSPITAL ENCOUNTER (INPATIENT)
Age: 56
LOS: 3 days | Discharge: HOME | DRG: 246 | End: 2021-08-04
Attending: FAMILY MEDICINE | Admitting: FAMILY MEDICINE
Payer: MEDICARE

## 2021-08-01 DIAGNOSIS — I20.0 UNSTABLE ANGINA (HCC): Primary | ICD-10-CM

## 2021-08-01 DIAGNOSIS — I25.10 CAD IN NATIVE ARTERY: ICD-10-CM

## 2021-08-01 PROBLEM — R07.9 CHEST PAIN: Status: ACTIVE | Noted: 2021-08-01

## 2021-08-01 LAB
ANION GAP SERPL CALCULATED.3IONS-SCNC: 13 MEQ/L (ref 8–16)
BUN BLDV-MCNC: 22 MG/DL (ref 7–22)
CALCIUM SERPL-MCNC: 9.3 MG/DL (ref 8.5–10.5)
CHLORIDE BLD-SCNC: 105 MEQ/L (ref 98–111)
CO2: 22 MEQ/L (ref 23–33)
CREAT SERPL-MCNC: 0.8 MG/DL (ref 0.4–1.2)
ERYTHROCYTE [DISTWIDTH] IN BLOOD BY AUTOMATED COUNT: 13.3 % (ref 11.5–14.5)
ERYTHROCYTE [DISTWIDTH] IN BLOOD BY AUTOMATED COUNT: 44.6 FL (ref 35–45)
GFR SERPL CREATININE-BSD FRML MDRD: > 90 ML/MIN/1.73M2
GLUCOSE BLD-MCNC: 109 MG/DL (ref 70–108)
GLUCOSE BLD-MCNC: 120 MG/DL (ref 70–108)
GLUCOSE BLD-MCNC: 133 MG/DL (ref 70–108)
GLUCOSE BLD-MCNC: 142 MG/DL (ref 70–108)
HCT VFR BLD CALC: 46.5 % (ref 42–52)
HEMOGLOBIN: 15 GM/DL (ref 14–18)
MCH RBC QN AUTO: 29.9 PG (ref 26–33)
MCHC RBC AUTO-ENTMCNC: 32.3 GM/DL (ref 32.2–35.5)
MCV RBC AUTO: 92.6 FL (ref 80–94)
PLATELET # BLD: 252 THOU/MM3 (ref 130–400)
PMV BLD AUTO: 9.7 FL (ref 9.4–12.4)
POTASSIUM REFLEX MAGNESIUM: 4.1 MEQ/L (ref 3.5–5.2)
RBC # BLD: 5.02 MILL/MM3 (ref 4.7–6.1)
SODIUM BLD-SCNC: 140 MEQ/L (ref 135–145)
TROPONIN T: < 0.01 NG/ML
TROPONIN T: < 0.01 NG/ML
WBC # BLD: 12.7 THOU/MM3 (ref 4.8–10.8)

## 2021-08-01 PROCEDURE — 85027 COMPLETE CBC AUTOMATED: CPT

## 2021-08-01 PROCEDURE — 99222 1ST HOSP IP/OBS MODERATE 55: CPT | Performed by: PHYSICIAN ASSISTANT

## 2021-08-01 PROCEDURE — 80048 BASIC METABOLIC PNL TOTAL CA: CPT

## 2021-08-01 PROCEDURE — 36415 COLL VENOUS BLD VENIPUNCTURE: CPT

## 2021-08-01 PROCEDURE — 6360000002 HC RX W HCPCS: Performed by: PHYSICIAN ASSISTANT

## 2021-08-01 PROCEDURE — 2500000003 HC RX 250 WO HCPCS: Performed by: PHYSICIAN ASSISTANT

## 2021-08-01 PROCEDURE — 6370000000 HC RX 637 (ALT 250 FOR IP): Performed by: PHYSICIAN ASSISTANT

## 2021-08-01 PROCEDURE — 2140000000 HC CCU INTERMEDIATE R&B

## 2021-08-01 PROCEDURE — 84484 ASSAY OF TROPONIN QUANT: CPT

## 2021-08-01 PROCEDURE — 99232 SBSQ HOSP IP/OBS MODERATE 35: CPT | Performed by: STUDENT IN AN ORGANIZED HEALTH CARE EDUCATION/TRAINING PROGRAM

## 2021-08-01 PROCEDURE — 6370000000 HC RX 637 (ALT 250 FOR IP): Performed by: STUDENT IN AN ORGANIZED HEALTH CARE EDUCATION/TRAINING PROGRAM

## 2021-08-01 PROCEDURE — 82948 REAGENT STRIP/BLOOD GLUCOSE: CPT

## 2021-08-01 PROCEDURE — 2580000003 HC RX 258: Performed by: PHYSICIAN ASSISTANT

## 2021-08-01 PROCEDURE — 99221 1ST HOSP IP/OBS SF/LOW 40: CPT | Performed by: INTERNAL MEDICINE

## 2021-08-01 RX ORDER — LOSARTAN POTASSIUM 50 MG/1
50 TABLET ORAL DAILY
Status: DISCONTINUED | OUTPATIENT
Start: 2021-08-01 | End: 2021-08-01

## 2021-08-01 RX ORDER — POTASSIUM CHLORIDE 20 MEQ/1
40 TABLET, EXTENDED RELEASE ORAL PRN
Status: DISCONTINUED | OUTPATIENT
Start: 2021-08-01 | End: 2021-08-04 | Stop reason: HOSPADM

## 2021-08-01 RX ORDER — GLIPIZIDE 5 MG/1
2.5 TABLET ORAL
Status: DISCONTINUED | OUTPATIENT
Start: 2021-08-01 | End: 2021-08-01

## 2021-08-01 RX ORDER — ASPIRIN 81 MG/1
81 TABLET, CHEWABLE ORAL DAILY
Status: DISCONTINUED | OUTPATIENT
Start: 2021-08-02 | End: 2021-08-03

## 2021-08-01 RX ORDER — SODIUM CHLORIDE 9 MG/ML
25 INJECTION, SOLUTION INTRAVENOUS PRN
Status: DISCONTINUED | OUTPATIENT
Start: 2021-08-01 | End: 2021-08-03 | Stop reason: SDUPTHER

## 2021-08-01 RX ORDER — NIFEDIPINE 30 MG/1
30 TABLET, EXTENDED RELEASE ORAL DAILY
Status: DISCONTINUED | OUTPATIENT
Start: 2021-08-01 | End: 2021-08-03

## 2021-08-01 RX ORDER — POLYETHYLENE GLYCOL 3350 17 G/17G
17 POWDER, FOR SOLUTION ORAL DAILY PRN
Status: DISCONTINUED | OUTPATIENT
Start: 2021-08-01 | End: 2021-08-04 | Stop reason: HOSPADM

## 2021-08-01 RX ORDER — CLOPIDOGREL BISULFATE 75 MG/1
75 TABLET ORAL DAILY
Status: DISCONTINUED | OUTPATIENT
Start: 2021-08-01 | End: 2021-08-03

## 2021-08-01 RX ORDER — SODIUM CHLORIDE 0.9 % (FLUSH) 0.9 %
5-40 SYRINGE (ML) INJECTION PRN
Status: DISCONTINUED | OUTPATIENT
Start: 2021-08-01 | End: 2021-08-03 | Stop reason: SDUPTHER

## 2021-08-01 RX ORDER — DEXTROSE MONOHYDRATE 25 G/50ML
12.5 INJECTION, SOLUTION INTRAVENOUS PRN
Status: DISCONTINUED | OUTPATIENT
Start: 2021-08-01 | End: 2021-08-04 | Stop reason: HOSPADM

## 2021-08-01 RX ORDER — ACETAMINOPHEN 650 MG/1
650 SUPPOSITORY RECTAL EVERY 6 HOURS PRN
Status: DISCONTINUED | OUTPATIENT
Start: 2021-08-01 | End: 2021-08-04 | Stop reason: HOSPADM

## 2021-08-01 RX ORDER — NITROGLYCERIN 20 MG/100ML
5-200 INJECTION INTRAVENOUS CONTINUOUS
Status: DISCONTINUED | OUTPATIENT
Start: 2021-08-01 | End: 2021-08-04 | Stop reason: HOSPADM

## 2021-08-01 RX ORDER — CALCIUM CARBONATE 200(500)MG
500 TABLET,CHEWABLE ORAL 3 TIMES DAILY PRN
Status: DISCONTINUED | OUTPATIENT
Start: 2021-08-01 | End: 2021-08-04 | Stop reason: HOSPADM

## 2021-08-01 RX ORDER — POTASSIUM CHLORIDE 7.45 MG/ML
10 INJECTION INTRAVENOUS PRN
Status: DISCONTINUED | OUTPATIENT
Start: 2021-08-01 | End: 2021-08-04 | Stop reason: HOSPADM

## 2021-08-01 RX ORDER — ACETAMINOPHEN 325 MG/1
650 TABLET ORAL EVERY 6 HOURS PRN
Status: DISCONTINUED | OUTPATIENT
Start: 2021-08-01 | End: 2021-08-04 | Stop reason: HOSPADM

## 2021-08-01 RX ORDER — SUCRALFATE 1 G/1
1 TABLET ORAL 4 TIMES DAILY
Status: DISCONTINUED | OUTPATIENT
Start: 2021-08-01 | End: 2021-08-04 | Stop reason: HOSPADM

## 2021-08-01 RX ORDER — LOSARTAN POTASSIUM 100 MG/1
100 TABLET ORAL DAILY
Status: DISCONTINUED | OUTPATIENT
Start: 2021-08-02 | End: 2021-08-04 | Stop reason: HOSPADM

## 2021-08-01 RX ORDER — ONDANSETRON 4 MG/1
4 TABLET, ORALLY DISINTEGRATING ORAL EVERY 8 HOURS PRN
Status: DISCONTINUED | OUTPATIENT
Start: 2021-08-01 | End: 2021-08-04 | Stop reason: HOSPADM

## 2021-08-01 RX ORDER — NICOTINE POLACRILEX 4 MG
15 LOZENGE BUCCAL PRN
Status: DISCONTINUED | OUTPATIENT
Start: 2021-08-01 | End: 2021-08-04 | Stop reason: HOSPADM

## 2021-08-01 RX ORDER — PANTOPRAZOLE SODIUM 40 MG/1
40 TABLET, DELAYED RELEASE ORAL
Status: DISCONTINUED | OUTPATIENT
Start: 2021-08-01 | End: 2021-08-04 | Stop reason: HOSPADM

## 2021-08-01 RX ORDER — FAMOTIDINE 20 MG/1
20 TABLET, FILM COATED ORAL 2 TIMES DAILY PRN
Status: DISCONTINUED | OUTPATIENT
Start: 2021-08-01 | End: 2021-08-04 | Stop reason: HOSPADM

## 2021-08-01 RX ORDER — INSULIN GLARGINE 100 [IU]/ML
15 INJECTION, SOLUTION SUBCUTANEOUS NIGHTLY
Status: DISCONTINUED | OUTPATIENT
Start: 2021-08-01 | End: 2021-08-04 | Stop reason: HOSPADM

## 2021-08-01 RX ORDER — SODIUM CHLORIDE 0.9 % (FLUSH) 0.9 %
5-40 SYRINGE (ML) INJECTION EVERY 12 HOURS SCHEDULED
Status: DISCONTINUED | OUTPATIENT
Start: 2021-08-01 | End: 2021-08-03 | Stop reason: SDUPTHER

## 2021-08-01 RX ORDER — MAGNESIUM SULFATE IN WATER 40 MG/ML
2000 INJECTION, SOLUTION INTRAVENOUS PRN
Status: DISCONTINUED | OUTPATIENT
Start: 2021-08-01 | End: 2021-08-04 | Stop reason: HOSPADM

## 2021-08-01 RX ORDER — DEXTROSE MONOHYDRATE 50 MG/ML
100 INJECTION, SOLUTION INTRAVENOUS PRN
Status: DISCONTINUED | OUTPATIENT
Start: 2021-08-01 | End: 2021-08-04 | Stop reason: HOSPADM

## 2021-08-01 RX ORDER — ATENOLOL 50 MG/1
50 TABLET ORAL DAILY
Status: DISCONTINUED | OUTPATIENT
Start: 2021-08-01 | End: 2021-08-03

## 2021-08-01 RX ORDER — LOSARTAN POTASSIUM 50 MG/1
50 TABLET ORAL ONCE
Status: COMPLETED | OUTPATIENT
Start: 2021-08-01 | End: 2021-08-01

## 2021-08-01 RX ORDER — HYDROCODONE BITARTRATE AND ACETAMINOPHEN 5; 325 MG/1; MG/1
1 TABLET ORAL ONCE
Status: COMPLETED | OUTPATIENT
Start: 2021-08-01 | End: 2021-08-01

## 2021-08-01 RX ORDER — ATORVASTATIN CALCIUM 80 MG/1
80 TABLET, FILM COATED ORAL NIGHTLY
Status: DISCONTINUED | OUTPATIENT
Start: 2021-08-01 | End: 2021-08-04 | Stop reason: HOSPADM

## 2021-08-01 RX ORDER — ONDANSETRON 2 MG/ML
4 INJECTION INTRAMUSCULAR; INTRAVENOUS EVERY 6 HOURS PRN
Status: DISCONTINUED | OUTPATIENT
Start: 2021-08-01 | End: 2021-08-04 | Stop reason: HOSPADM

## 2021-08-01 RX ADMIN — LOSARTAN POTASSIUM 50 MG: 50 TABLET, FILM COATED ORAL at 08:55

## 2021-08-01 RX ADMIN — LOSARTAN POTASSIUM 50 MG: 50 TABLET, FILM COATED ORAL at 11:53

## 2021-08-01 RX ADMIN — PANTOPRAZOLE SODIUM 40 MG: 40 TABLET, DELAYED RELEASE ORAL at 06:46

## 2021-08-01 RX ADMIN — GLIPIZIDE 2.5 MG: 5 TABLET ORAL at 08:55

## 2021-08-01 RX ADMIN — HYDROCODONE BITARTRATE AND ACETAMINOPHEN 1 TABLET: 5; 325 TABLET ORAL at 15:22

## 2021-08-01 RX ADMIN — CLOPIDOGREL BISULFATE 75 MG: 75 TABLET ORAL at 08:55

## 2021-08-01 RX ADMIN — NITROGLYCERIN 5 MCG/MIN: 20 INJECTION INTRAVENOUS at 03:18

## 2021-08-01 RX ADMIN — SUCRALFATE 1 G: 1 TABLET ORAL at 11:55

## 2021-08-01 RX ADMIN — SODIUM CHLORIDE, PRESERVATIVE FREE 10 ML: 5 INJECTION INTRAVENOUS at 20:20

## 2021-08-01 RX ADMIN — ATORVASTATIN CALCIUM 80 MG: 80 TABLET, FILM COATED ORAL at 20:16

## 2021-08-01 RX ADMIN — ENOXAPARIN SODIUM 40 MG: 40 INJECTION SUBCUTANEOUS at 08:55

## 2021-08-01 RX ADMIN — SUCRALFATE 1 G: 1 TABLET ORAL at 20:20

## 2021-08-01 RX ADMIN — ANTACID TABLETS 500 MG: 500 TABLET, CHEWABLE ORAL at 03:43

## 2021-08-01 RX ADMIN — FAMOTIDINE 20 MG: 20 TABLET, FILM COATED ORAL at 08:55

## 2021-08-01 RX ADMIN — ATENOLOL 50 MG: 50 TABLET ORAL at 08:55

## 2021-08-01 RX ADMIN — PANTOPRAZOLE SODIUM 40 MG: 40 TABLET, DELAYED RELEASE ORAL at 15:23

## 2021-08-01 RX ADMIN — NIFEDIPINE 30 MG: 30 TABLET, FILM COATED, EXTENDED RELEASE ORAL at 11:53

## 2021-08-01 RX ADMIN — SUCRALFATE 1 G: 1 TABLET ORAL at 15:23

## 2021-08-01 RX ADMIN — SUCRALFATE 1 G: 1 TABLET ORAL at 08:55

## 2021-08-01 RX ADMIN — ACETAMINOPHEN 650 MG: 325 TABLET ORAL at 14:03

## 2021-08-01 ASSESSMENT — PAIN SCALES - GENERAL
PAINLEVEL_OUTOF10: 8
PAINLEVEL_OUTOF10: 0
PAINLEVEL_OUTOF10: 0
PAINLEVEL_OUTOF10: 4
PAINLEVEL_OUTOF10: 8

## 2021-08-01 ASSESSMENT — PAIN DESCRIPTION - DESCRIPTORS: DESCRIPTORS: BURNING

## 2021-08-01 ASSESSMENT — PAIN DESCRIPTION - DIRECTION: RADIATING_TOWARDS: ARM

## 2021-08-01 ASSESSMENT — ENCOUNTER SYMPTOMS
ALLERGIC/IMMUNOLOGIC NEGATIVE: 1
EYES NEGATIVE: 1
SHORTNESS OF BREATH: 1
NAUSEA: 1

## 2021-08-01 ASSESSMENT — PAIN DESCRIPTION - ORIENTATION: ORIENTATION: LEFT

## 2021-08-01 ASSESSMENT — PAIN DESCRIPTION - ONSET: ONSET: ON-GOING

## 2021-08-01 ASSESSMENT — PAIN DESCRIPTION - FREQUENCY: FREQUENCY: INTERMITTENT

## 2021-08-01 ASSESSMENT — PAIN DESCRIPTION - LOCATION: LOCATION: CHEST

## 2021-08-01 ASSESSMENT — PAIN DESCRIPTION - PAIN TYPE: TYPE: ACUTE PAIN
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
## 2021-08-01 NOTE — H&P
Hospitalist - History & Physical      Patient: Lalito Chen    Unit/Bed:3B-24/024-A  YOB: 1965  MRN: [id number]   Acct: [de-identified]   PCP: Jurgen Cloud MD      Assessment and Plan:        1. Chest pain: controlled on Nitro drip, troponin negative, start daily ASA, high intensity statin, continue Plavix, cardiology consult  2. Persistent indigestion: taking TUMS numerous times daily - not controlled until Nitro drip started  3. DM II: continue home medication, low dose sliding scale insulin, hypoglycemic treatment orders  4. Essential hypertension: poorly controlled over the last few weeks per the patient, very labile on nitro drip as well      CC:  Chest pain    HPI: Patient transferred from [facility] ED where he returns to the ED with persistent indigestion and lower chest pressure. The patient saw cardiology late last week and was scheduled for outpatient stress test.  His symptoms were more intense today and more concerning. The patient complains of indigestion that has been getting worse for the last one month - it is not worse with eating or drinking but worse with activity - working in the yard or walking. The patient has been taking TUMS 7-8 times daily with no relief of symptoms but when in the hospital a few days ago and again today he gets relief with the Nitro drip. Troponins continue to be negative, patient admitted for further evaluation of atypical chest pain symptoms. ROS: Review of Systems   Constitutional: Positive for appetite change. HENT: Negative. Eyes: Negative. Respiratory: Positive for shortness of breath. Cardiovascular: Positive for chest pain. Negative for leg swelling. Gastrointestinal: Positive for nausea. Indigestion   Endocrine: Negative. Musculoskeletal: Negative. Skin: Negative. Allergic/Immunologic: Negative. Neurological: Negative. Hematological: Negative. Psychiatric/Behavioral: Negative.       PMH: Well appearing, no acute distress  HEENT:  normocephalic and atraumatic. No scleral icterus. PEARLA, mucous membranes moist  Neck: supple. Trachea midline. No JVD. Full ROM, no meningismus. Lungs: clear to auscultation. No retractions, no accessory muscle use. Cardiac: RRR, no murmur, 2+ pulses  Abdomen: soft. Nontender. Bowel sounds active  Extremities:  No clubbing, cyanosis x 4, no edema    Vasculature: capillary refill < 3 seconds. Skin:  warm and dry. no visible rashes  Psych:  Alert and oriented x3. Affect appropriate  Lymph:  No supraclavicular adenopathy. Neurologic:  CN II-XII grossly intact. No focal deficit. Data: (All radiographs, tracings, PFTs, and imaging are personally viewed and interpreted unless otherwise noted).     EKG: pending this encounter        Electronically signed by  Kamlesh Ku PA-C

## 2021-08-01 NOTE — PROGRESS NOTES
Hospitalist Progress Note      Patient:  Nicole Lion    Unit/Bed:3B-24/024-A  YOB: 1965  MRN: [id number]   Acct: [de-identified]   PCP: Jeffrey Atkins MD  Date of Admission: 8/1/2021    Assessment/Plan:    1. Atypical chest pain:  2. GERD:  3. HTN, uncontrolled  a. Patient's symptoms do not appear to be cardiac in nature, but have been persistent for quite a while. He was scheduled to have an outpatient stress test completed tomorrow. Per cardiology, will have this completed as an inpatient tomorrow. b. Troponin negative x4. EKG without acute ischemic changes. c. We will continue with nitroglycerin drip for now.  d. Symptoms are concerning for gastroesophageal reflux disease and patient has not been evaluated with gastroenterology. He was placed on PPI therapy twice daily, Carafate, and H2 blocker therapy several days ago, but does not see much improvement in his symptoms. We will continue twice daily Protonix and Carafate.  e. If stress test is negative for reversible ischemia, will consult gastroenterology for endoscopic evaluation. f. We will increase her losartan from 50 to 100 mg and will also add nifedipine 30 mg daily for blood pressure control. g. Continue aspirin, Plavix, atenolol  h. Cardiology following. Appreciate their recommendations. 4. Diabetes mellitus type 2:   a. Basal/bolus insulin with Accu-Cheks AC at bedtime  b. We will check A1c  c. Hold home glimepiride and Metformin    Disposition: Disposition likely home with family in 36 to 67 hours following ischemic and likely gastroenterology evaluations. Chief Complaint: Chest pain    Hospital Course:    Patient is a very pleasant 27-year-old male with multiple coronary artery disease risk factors include diabetes, obesity, male gender, and uncontrolled hypertension. The patient has had persistent chest pain for several weeks and was recently evaluated in the ER. At that time, he was prescribed PPI twice daily, H2 blocker, and Carafate. His symptoms have persisted over the preceding several days, typically following a meal and described as a burning sensation with some radiation to his left arm. Patient is able to exercise without chest pain. He was scheduled to have a stress test completed on Monday, 8/2, to further evaluate. Subjective (past 24 hours):   Patient states he feels well today. He is not currently having any chest pain. He feels that his symptoms are more burning in nature described as acid reflux, relieved with Tums and/or Rolaids. Patient notes that he has hypertension, but feels as though his recent blood pressure recordings have been higher than typical.      Past medical history, family history, social history and allergies reviewed again and is unchanged since admission. ROS (12 point review of systems completed. Pertinent positives noted.  Otherwise ROS is negative)     Medications:  Reviewed    Infusion Medications    dextrose      sodium chloride      nitroGLYCERIN 30 mcg/min (08/01/21 0649)     Scheduled Medications    atenolol  50 mg Oral Daily    clopidogrel  75 mg Oral Daily    glipiZIDE  2.5 mg Oral QAM AC    pantoprazole  40 mg Oral BID AC    sucralfate  1 g Oral 4x Daily    insulin lispro  0-6 Units Subcutaneous TID WC    insulin lispro  0-3 Units Subcutaneous Nightly    sodium chloride flush  5-40 mL Intravenous 2 times per day    [START ON 8/2/2021] aspirin  81 mg Oral Daily    atorvastatin  80 mg Oral Nightly    enoxaparin  40 mg Subcutaneous Daily    [START ON 8/2/2021] losartan  100 mg Oral Daily    NIFEdipine  30 mg Oral Daily    losartan  50 mg Oral Once     PRN Meds: famotidine, glucose, dextrose, glucagon (rDNA), dextrose, sodium chloride flush, sodium chloride, ondansetron **OR** ondansetron, acetaminophen **OR** acetaminophen, polyethylene glycol, potassium chloride **OR** potassium alternative oral replacement **OR** potassium chloride, magnesium sulfate, calcium carbonate, regadenoson      Intake/Output Summary (Last 24 hours) at 8/1/2021 1142  Last data filed at 8/1/2021 0520  Gross per 24 hour   Intake --   Output 925 ml   Net -925 ml       Diet:  ADULT DIET; Clear Liquid; No Caffeine  Diet NPO    Exam:  BP (!) 181/90   Pulse 64   Temp 97.7 °F (36.5 °C) (Oral)   Resp 16   Ht 5' 10\" (1.778 m)   Wt 235 lb 1.6 oz (106.6 kg)   SpO2 97%   BMI 33.73 kg/m²   General appearance: No apparent distress, appears stated age and cooperative. HEENT: Pupils equal, round, and reactive to light. Conjunctivae/corneas clear. Neck: Supple, with full range of motion. No jugular venous distention. Trachea midline. Respiratory:  Normal respiratory effort. Clear to auscultation, bilaterally without Rales/Wheezes/Rhonchi. Cardiovascular: Regular rate and rhythm with normal S1/S2 without murmurs, rubs or gallops. Abdomen: Soft, non-tender, non-distended with normal bowel sounds. Musculoskeletal: passive and active ROM x 4 extremities. Skin: Multiple chronic skin lesions throughout the body from prior surgeries. No active skin lesions or rashes. No drainage or erythema. Neurologic:  Neurovascularly intact without any focal sensory/motor deficits. Cranial nerves: II-XII intact, grossly non-focal.  Psychiatric: Alert and oriented, thought content appropriate, normal insight  Capillary Refill: Brisk,< 3 seconds   Peripheral Pulses: +2 palpable, equal bilaterally     Labs:   Recent Labs     07/29/21 1949 08/01/21  0338   WBC 9.6 12.7*   HGB 14.5 15.0   HCT 44.2 46.5    252     Recent Labs     07/29/21 1949 08/01/21  0735    140   K 4.4 4.1    105   CO2 25 22*   BUN 30* 22   CREATININE 1.0 0.8   CALCIUM 9.3 9.3     Recent Labs     07/29/21 1949   AST 10   ALT 10*   BILIDIR <0.2   BILITOT 0.4   ALKPHOS 103     No results for input(s): INR in the last 72 hours.   No results for input(s): CKTOTAL, TROPONINI in the last 72 hours. Microbiology:    Blood culture #1: No results found for: BC    Blood culture #2:No results found for: Xochitl Faulkner    Organism:No results found for: ORG    No results found for: LABGRAM    MRSA culture only:No results found for: [facility]    Urine culture: No results found for: LABURIN    Respiratory culture: No results found for: CULTRESP    Aerobic and Anaerobic :  No results found for: LABAERO  No results found for: LABANAE    Urinalysis:    No results found for: Yareli Fast, BACTERIA, RBCUA, BLOODU, [address], GLUCOSEU    Radiology:  No orders to display     No results found.     Electronically signed by Larry Hollingsworth DO on 8/1/2021 at 11:42 AM

## 2021-08-02 ENCOUNTER — APPOINTMENT (OUTPATIENT)
Dept: CARDIAC CATH/INVASIVE PROCEDURES | Age: 56
DRG: 246 | End: 2021-08-02
Attending: FAMILY MEDICINE
Payer: MEDICARE

## 2021-08-02 ENCOUNTER — APPOINTMENT (OUTPATIENT)
Dept: GENERAL RADIOLOGY | Age: 56
DRG: 246 | End: 2021-08-02
Attending: FAMILY MEDICINE
Payer: MEDICARE

## 2021-08-02 LAB
ANION GAP SERPL CALCULATED.3IONS-SCNC: 11 MEQ/L (ref 8–16)
APTT: 30.7 SECONDS (ref 22–38)
AVERAGE GLUCOSE: 120 MG/DL (ref 70–126)
BASOPHILS # BLD: 0.8 %
BASOPHILS ABSOLUTE: 0.1 THOU/MM3 (ref 0–0.1)
BUN BLDV-MCNC: 27 MG/DL (ref 7–22)
CALCIUM SERPL-MCNC: 9 MG/DL (ref 8.5–10.5)
CHLORIDE BLD-SCNC: 104 MEQ/L (ref 98–111)
CO2: 22 MEQ/L (ref 23–33)
CREAT SERPL-MCNC: 0.8 MG/DL (ref 0.4–1.2)
EKG ATRIAL RATE: 57 BPM
EKG ATRIAL RATE: 69 BPM
EKG P AXIS: 39 DEGREES
EKG P AXIS: 56 DEGREES
EKG P-R INTERVAL: 174 MS
EKG P-R INTERVAL: 186 MS
EKG Q-T INTERVAL: 400 MS
EKG Q-T INTERVAL: 428 MS
EKG QRS DURATION: 100 MS
EKG QRS DURATION: 92 MS
EKG QTC CALCULATION (BAZETT): 416 MS
EKG QTC CALCULATION (BAZETT): 428 MS
EKG R AXIS: 58 DEGREES
EKG R AXIS: 60 DEGREES
EKG T AXIS: 37 DEGREES
EKG T AXIS: 50 DEGREES
EKG VENTRICULAR RATE: 57 BPM
EKG VENTRICULAR RATE: 69 BPM
EOSINOPHIL # BLD: 6.3 %
EOSINOPHILS ABSOLUTE: 0.6 THOU/MM3 (ref 0–0.4)
ERYTHROCYTE [DISTWIDTH] IN BLOOD BY AUTOMATED COUNT: 13.4 % (ref 11.5–14.5)
ERYTHROCYTE [DISTWIDTH] IN BLOOD BY AUTOMATED COUNT: 46.9 FL (ref 35–45)
GFR SERPL CREATININE-BSD FRML MDRD: > 90 ML/MIN/1.73M2
GLUCOSE BLD-MCNC: 143 MG/DL (ref 70–108)
GLUCOSE BLD-MCNC: 146 MG/DL (ref 70–108)
GLUCOSE BLD-MCNC: 151 MG/DL (ref 70–108)
GLUCOSE BLD-MCNC: 173 MG/DL (ref 70–108)
GLUCOSE BLD-MCNC: 182 MG/DL (ref 70–108)
HBA1C MFR BLD: 6 % (ref 4.4–6.4)
HCT VFR BLD CALC: 48.3 % (ref 42–52)
HEMOGLOBIN: 15.4 GM/DL (ref 14–18)
IMMATURE GRANS (ABS): 0.05 THOU/MM3 (ref 0–0.07)
IMMATURE GRANULOCYTES: 0.5 %
LV EF: 58 %
LVEF MODALITY: NORMAL
LYMPHOCYTES # BLD: 21.9 %
LYMPHOCYTES ABSOLUTE: 2.2 THOU/MM3 (ref 1–4.8)
MCH RBC QN AUTO: 30.1 PG (ref 26–33)
MCHC RBC AUTO-ENTMCNC: 31.9 GM/DL (ref 32.2–35.5)
MCV RBC AUTO: 94.5 FL (ref 80–94)
MONOCYTES # BLD: 8.6 %
MONOCYTES ABSOLUTE: 0.9 THOU/MM3 (ref 0.4–1.3)
NUCLEATED RED BLOOD CELLS: 0 /100 WBC
PLATELET # BLD: 228 THOU/MM3 (ref 130–400)
PMV BLD AUTO: 9.5 FL (ref 9.4–12.4)
POTASSIUM SERPL-SCNC: 3.8 MEQ/L (ref 3.5–5.2)
RBC # BLD: 5.11 MILL/MM3 (ref 4.7–6.1)
SEG NEUTROPHILS: 61.9 %
SEGMENTED NEUTROPHILS ABSOLUTE COUNT: 6.3 THOU/MM3 (ref 1.8–7.7)
SODIUM BLD-SCNC: 137 MEQ/L (ref 135–145)
TROPONIN T: 0.01 NG/ML
WBC # BLD: 10.2 THOU/MM3 (ref 4.8–10.8)

## 2021-08-02 PROCEDURE — 6370000000 HC RX 637 (ALT 250 FOR IP): Performed by: PHYSICIAN ASSISTANT

## 2021-08-02 PROCEDURE — 71045 X-RAY EXAM CHEST 1 VIEW: CPT

## 2021-08-02 PROCEDURE — 6370000000 HC RX 637 (ALT 250 FOR IP)

## 2021-08-02 PROCEDURE — 93458 L HRT ARTERY/VENTRICLE ANGIO: CPT | Performed by: INTERNAL MEDICINE

## 2021-08-02 PROCEDURE — 82948 REAGENT STRIP/BLOOD GLUCOSE: CPT

## 2021-08-02 PROCEDURE — 2580000003 HC RX 258: Performed by: INTERNAL MEDICINE

## 2021-08-02 PROCEDURE — 36415 COLL VENOUS BLD VENIPUNCTURE: CPT

## 2021-08-02 PROCEDURE — 4A023N7 MEASUREMENT OF CARDIAC SAMPLING AND PRESSURE, LEFT HEART, PERCUTANEOUS APPROACH: ICD-10-PCS | Performed by: INTERNAL MEDICINE

## 2021-08-02 PROCEDURE — C1769 GUIDE WIRE: HCPCS

## 2021-08-02 PROCEDURE — B2151ZZ FLUOROSCOPY OF LEFT HEART USING LOW OSMOLAR CONTRAST: ICD-10-PCS | Performed by: INTERNAL MEDICINE

## 2021-08-02 PROCEDURE — 93005 ELECTROCARDIOGRAM TRACING: CPT | Performed by: PHYSICIAN ASSISTANT

## 2021-08-02 PROCEDURE — B2111ZZ FLUOROSCOPY OF MULTIPLE CORONARY ARTERIES USING LOW OSMOLAR CONTRAST: ICD-10-PCS | Performed by: INTERNAL MEDICINE

## 2021-08-02 PROCEDURE — C1894 INTRO/SHEATH, NON-LASER: HCPCS

## 2021-08-02 PROCEDURE — 93005 ELECTROCARDIOGRAM TRACING: CPT

## 2021-08-02 PROCEDURE — 85025 COMPLETE CBC W/AUTO DIFF WBC: CPT

## 2021-08-02 PROCEDURE — B2131ZZ FLUOROSCOPY OF MULTIPLE CORONARY ARTERY BYPASS GRAFTS USING LOW OSMOLAR CONTRAST: ICD-10-PCS | Performed by: INTERNAL MEDICINE

## 2021-08-02 PROCEDURE — 6360000004 HC RX CONTRAST MEDICATION: Performed by: INTERNAL MEDICINE

## 2021-08-02 PROCEDURE — 6360000002 HC RX W HCPCS: Performed by: INTERNAL MEDICINE

## 2021-08-02 PROCEDURE — 93306 TTE W/DOPPLER COMPLETE: CPT

## 2021-08-02 PROCEDURE — 93458 L HRT ARTERY/VENTRICLE ANGIO: CPT

## 2021-08-02 PROCEDURE — 99232 SBSQ HOSP IP/OBS MODERATE 35: CPT | Performed by: STUDENT IN AN ORGANIZED HEALTH CARE EDUCATION/TRAINING PROGRAM

## 2021-08-02 PROCEDURE — 2500000003 HC RX 250 WO HCPCS

## 2021-08-02 PROCEDURE — APPSS60 APP SPLIT SHARED TIME 46-60 MINUTES: Performed by: PHYSICIAN ASSISTANT

## 2021-08-02 PROCEDURE — 85730 THROMBOPLASTIN TIME PARTIAL: CPT

## 2021-08-02 PROCEDURE — 83036 HEMOGLOBIN GLYCOSYLATED A1C: CPT

## 2021-08-02 PROCEDURE — 6360000002 HC RX W HCPCS

## 2021-08-02 PROCEDURE — 80048 BASIC METABOLIC PNL TOTAL CA: CPT

## 2021-08-02 PROCEDURE — 2140000000 HC CCU INTERMEDIATE R&B

## 2021-08-02 PROCEDURE — 99223 1ST HOSP IP/OBS HIGH 75: CPT | Performed by: STUDENT IN AN ORGANIZED HEALTH CARE EDUCATION/TRAINING PROGRAM

## 2021-08-02 PROCEDURE — 84484 ASSAY OF TROPONIN QUANT: CPT

## 2021-08-02 RX ORDER — HEPARIN SODIUM 1000 [USP'U]/ML
4000 INJECTION, SOLUTION INTRAVENOUS; SUBCUTANEOUS PRN
Status: DISCONTINUED | OUTPATIENT
Start: 2021-08-02 | End: 2021-08-04

## 2021-08-02 RX ORDER — NITROGLYCERIN 0.4 MG/1
TABLET SUBLINGUAL
Status: COMPLETED
Start: 2021-08-02 | End: 2021-08-02

## 2021-08-02 RX ORDER — SODIUM CHLORIDE 9 MG/ML
500 INJECTION, SOLUTION INTRAVENOUS CONTINUOUS PRN
Status: ACTIVE | OUTPATIENT
Start: 2021-08-02 | End: 2021-08-02

## 2021-08-02 RX ORDER — NITROGLYCERIN 0.4 MG/1
0.4 TABLET SUBLINGUAL EVERY 5 MIN PRN
Status: DISCONTINUED | OUTPATIENT
Start: 2021-08-02 | End: 2021-08-03 | Stop reason: SDUPTHER

## 2021-08-02 RX ORDER — METOPROLOL TARTRATE 5 MG/5ML
5 INJECTION INTRAVENOUS EVERY 5 MIN PRN
Status: ACTIVE | OUTPATIENT
Start: 2021-08-02 | End: 2021-08-02

## 2021-08-02 RX ORDER — HEPARIN SODIUM 1000 [USP'U]/ML
2000 INJECTION, SOLUTION INTRAVENOUS; SUBCUTANEOUS PRN
Status: DISCONTINUED | OUTPATIENT
Start: 2021-08-02 | End: 2021-08-04

## 2021-08-02 RX ORDER — ATROPINE SULFATE 0.1 MG/ML
0.5 INJECTION INTRAVENOUS EVERY 5 MIN PRN
Status: ACTIVE | OUTPATIENT
Start: 2021-08-02 | End: 2021-08-02

## 2021-08-02 RX ORDER — HEPARIN SODIUM 10000 [USP'U]/100ML
5-30 INJECTION, SOLUTION INTRAVENOUS CONTINUOUS
Status: DISCONTINUED | OUTPATIENT
Start: 2021-08-02 | End: 2021-08-04

## 2021-08-02 RX ORDER — SODIUM CHLORIDE 0.9 % (FLUSH) 0.9 %
5-40 SYRINGE (ML) INJECTION PRN
Status: DISCONTINUED | OUTPATIENT
Start: 2021-08-02 | End: 2021-08-02 | Stop reason: SDUPTHER

## 2021-08-02 RX ORDER — SODIUM CHLORIDE 0.9 % (FLUSH) 0.9 %
5-40 SYRINGE (ML) INJECTION PRN
Status: ACTIVE | OUTPATIENT
Start: 2021-08-02 | End: 2021-08-02

## 2021-08-02 RX ORDER — SODIUM CHLORIDE 9 MG/ML
25 INJECTION, SOLUTION INTRAVENOUS PRN
Status: DISCONTINUED | OUTPATIENT
Start: 2021-08-02 | End: 2021-08-02 | Stop reason: SDUPTHER

## 2021-08-02 RX ORDER — NITROGLYCERIN 0.4 MG/1
0.4 TABLET SUBLINGUAL EVERY 5 MIN PRN
Status: ACTIVE | OUTPATIENT
Start: 2021-08-02 | End: 2021-08-02

## 2021-08-02 RX ORDER — ACETAMINOPHEN 325 MG/1
650 TABLET ORAL EVERY 4 HOURS PRN
Status: DISCONTINUED | OUTPATIENT
Start: 2021-08-02 | End: 2021-08-02 | Stop reason: SDUPTHER

## 2021-08-02 RX ORDER — MORPHINE SULFATE 2 MG/ML
2 INJECTION, SOLUTION INTRAMUSCULAR; INTRAVENOUS EVERY 4 HOURS PRN
Status: DISCONTINUED | OUTPATIENT
Start: 2021-08-02 | End: 2021-08-04 | Stop reason: HOSPADM

## 2021-08-02 RX ORDER — AMINOPHYLLINE DIHYDRATE 25 MG/ML
50 INJECTION, SOLUTION INTRAVENOUS PRN
Status: ACTIVE | OUTPATIENT
Start: 2021-08-02 | End: 2021-08-02

## 2021-08-02 RX ORDER — MORPHINE SULFATE 2 MG/ML
INJECTION, SOLUTION INTRAMUSCULAR; INTRAVENOUS
Status: COMPLETED
Start: 2021-08-02 | End: 2021-08-02

## 2021-08-02 RX ORDER — MORPHINE SULFATE 2 MG/ML
2 INJECTION, SOLUTION INTRAMUSCULAR; INTRAVENOUS ONCE
Status: COMPLETED | OUTPATIENT
Start: 2021-08-02 | End: 2021-08-02

## 2021-08-02 RX ORDER — SODIUM CHLORIDE 9 MG/ML
INJECTION, SOLUTION INTRAVENOUS CONTINUOUS
Status: DISCONTINUED | OUTPATIENT
Start: 2021-08-02 | End: 2021-08-03 | Stop reason: SDUPTHER

## 2021-08-02 RX ORDER — SODIUM CHLORIDE 0.9 % (FLUSH) 0.9 %
5-40 SYRINGE (ML) INJECTION EVERY 12 HOURS SCHEDULED
Status: DISCONTINUED | OUTPATIENT
Start: 2021-08-02 | End: 2021-08-02 | Stop reason: SDUPTHER

## 2021-08-02 RX ORDER — HEPARIN SODIUM 1000 [USP'U]/ML
60 INJECTION, SOLUTION INTRAVENOUS; SUBCUTANEOUS ONCE
Status: DISCONTINUED | OUTPATIENT
Start: 2021-08-02 | End: 2021-08-02 | Stop reason: CLARIF

## 2021-08-02 RX ORDER — ALBUTEROL SULFATE 90 UG/1
2 AEROSOL, METERED RESPIRATORY (INHALATION) PRN
Status: ACTIVE | OUTPATIENT
Start: 2021-08-02 | End: 2021-08-02

## 2021-08-02 RX ADMIN — NITROGLYCERIN 0.4 MG: 0.4 TABLET SUBLINGUAL at 03:34

## 2021-08-02 RX ADMIN — MORPHINE SULFATE 2 MG: 2 INJECTION, SOLUTION INTRAMUSCULAR; INTRAVENOUS at 04:22

## 2021-08-02 RX ADMIN — PANTOPRAZOLE SODIUM 40 MG: 40 TABLET, DELAYED RELEASE ORAL at 17:20

## 2021-08-02 RX ADMIN — FAMOTIDINE 20 MG: 20 TABLET, FILM COATED ORAL at 03:31

## 2021-08-02 RX ADMIN — ATORVASTATIN CALCIUM 80 MG: 80 TABLET, FILM COATED ORAL at 21:30

## 2021-08-02 RX ADMIN — SODIUM CHLORIDE: 9 INJECTION, SOLUTION INTRAVENOUS at 16:27

## 2021-08-02 RX ADMIN — CLOPIDOGREL BISULFATE 75 MG: 75 TABLET ORAL at 09:13

## 2021-08-02 RX ADMIN — HEPARIN SODIUM 9.4 UNITS/KG/HR: 10000 INJECTION, SOLUTION INTRAVENOUS at 16:21

## 2021-08-02 RX ADMIN — IOPAMIDOL 60 ML: 755 INJECTION, SOLUTION INTRAVENOUS at 10:25

## 2021-08-02 RX ADMIN — ACETAMINOPHEN 650 MG: 325 TABLET ORAL at 21:38

## 2021-08-02 RX ADMIN — SUCRALFATE 1 G: 1 TABLET ORAL at 21:30

## 2021-08-02 RX ADMIN — ACETAMINOPHEN 650 MG: 325 TABLET ORAL at 16:22

## 2021-08-02 RX ADMIN — SUCRALFATE 1 G: 1 TABLET ORAL at 16:28

## 2021-08-02 RX ADMIN — NITROGLYCERIN 0.4 MG: 0.4 TABLET SUBLINGUAL at 03:29

## 2021-08-02 RX ADMIN — ASPIRIN 81 MG: 81 TABLET, CHEWABLE ORAL at 09:13

## 2021-08-02 RX ADMIN — ACETAMINOPHEN 650 MG: 325 TABLET ORAL at 04:04

## 2021-08-02 RX ADMIN — PANTOPRAZOLE SODIUM 40 MG: 40 TABLET, DELAYED RELEASE ORAL at 04:06

## 2021-08-02 ASSESSMENT — PAIN DESCRIPTION - ONSET
ONSET: ON-GOING

## 2021-08-02 ASSESSMENT — PAIN DESCRIPTION - DESCRIPTORS
DESCRIPTORS: BURNING
DESCRIPTORS: HEADACHE
DESCRIPTORS: ACHING
DESCRIPTORS: BURNING;STABBING
DESCRIPTORS: ACHING

## 2021-08-02 ASSESSMENT — PAIN SCALES - GENERAL
PAINLEVEL_OUTOF10: 5
PAINLEVEL_OUTOF10: 5
PAINLEVEL_OUTOF10: 2
PAINLEVEL_OUTOF10: 8
PAINLEVEL_OUTOF10: 4
PAINLEVEL_OUTOF10: 9
PAINLEVEL_OUTOF10: 0
PAINLEVEL_OUTOF10: 4
PAINLEVEL_OUTOF10: 8
PAINLEVEL_OUTOF10: 4
PAINLEVEL_OUTOF10: 4

## 2021-08-02 ASSESSMENT — PAIN DESCRIPTION - PAIN TYPE
TYPE: ACUTE PAIN

## 2021-08-02 ASSESSMENT — PAIN DESCRIPTION - FREQUENCY
FREQUENCY: CONTINUOUS
FREQUENCY: INTERMITTENT
FREQUENCY: INTERMITTENT
FREQUENCY: CONTINUOUS

## 2021-08-02 ASSESSMENT — PAIN DESCRIPTION - LOCATION
LOCATION: CHEST;ARM
LOCATION: ARM;CHEST
LOCATION: CHEST
LOCATION: CHEST
LOCATION: ARM;CHEST
LOCATION: HEAD
LOCATION: ARM;CHEST
LOCATION: ARM;CHEST

## 2021-08-02 ASSESSMENT — PAIN DESCRIPTION - DIRECTION
RADIATING_TOWARDS: ARM
RADIATING_TOWARDS: ARM

## 2021-08-02 ASSESSMENT — PAIN - FUNCTIONAL ASSESSMENT
PAIN_FUNCTIONAL_ASSESSMENT: ACTIVITIES ARE NOT PREVENTED
PAIN_FUNCTIONAL_ASSESSMENT: PREVENTS OR INTERFERES SOME ACTIVE ACTIVITIES AND ADLS

## 2021-08-02 ASSESSMENT — PAIN DESCRIPTION - PROGRESSION
CLINICAL_PROGRESSION: GRADUALLY IMPROVING

## 2021-08-02 ASSESSMENT — PAIN DESCRIPTION - ORIENTATION
ORIENTATION: LEFT

## 2021-08-02 NOTE — PRE SEDATION
[facility]  Sedation/Analgesia History & Physical    Pt Name: Cody Perez  Account number: [de-identified]  MRN: [id number]  YOB: 1965  Provider Performing Procedure: Aiyana Caputo MD MD  Referring Provider: Aleida Bailey DO   Primary Care Physician: Tasha Oakley MD  Date: 8/2/2021    PRE-PROCEDURE    Code Status: FULL CODE  Brief History/Pre-Procedure Diagnosis:     Unstable Angina/ACS  Mild increase in Troponin         Multivessel CAD  PMH includes CVA, DM, HTN     Consent: : I have discussed with the patient risks, benefits, and alternatives (and relevant risks, benefits, and side effects related to alternatives or not receiving care), and likelihood of the success. The patient and/or representative appear to understand and agree to proceed. The discussion encompasses risks, benefits, and side effects related to the alternatives and the risks related to not receiving the proposed care, treatment, and services. The indication, risks and benefits of the procedure and possible therapeutic consequences and alternatives were discussed with the patient. The patient was given the opportunity to ask questions and to have them answered to his/her satisfaction. Risks of the procedure include but are not limited to the following: Bleeding, hematoma including retroperitoneal hemmorhage, infection, pain and discomfort, injury to the aorta and other blood vessels, rhythm disturbance, low blood pressure, myocardial infarction, stroke, kidney damage/failure, myocardial perforation, allergic reactions to sedatives/contrast material, loss of pulse/vascular compromise requiring surgery, aneurysm/pseudoaneurysm formation, possible loss of a limb/hand/leg, needing blood transfusion, requiring emergent open heart surgery or emergent coronary intervention, the need for intubation/respiratory support, the requirement for defibrillation/cardioversion, and death.  Alternatives to and omission of the suggested procedure were discussed. The patient had no further questions and wished to proceed; the consent form was signed. MEDICAL HISTORY  []ASHD/ANGINA/MI/CHF   []Hypertension  []Diabetes  []Hyperlipidemia  []Smoking  []Family Hx of ASHD  []Additional information:       has a past medical history of Arthritis, Diabetes mellitus ([address] Hypertension, Pneumonia, and Unspecified cerebral artery occlusion with cerebral infarction. SURGICAL HISTORY   has a past surgical history that includes Abdomen surgery; fracture surgery; Femur fracture surgery (Left); Tibia fracture surgery (Right); Toe amputation (Left); Vena Cava Filter Placement; and Skin graft.   Additional information:       ALLERGIES   Allergies as of 07/31/2021 - Fully Reviewed 07/30/2021   Allergen Reaction Noted    Bactrim [sulfamethoxazole-trimethoprim] Swelling 09/18/2013    Lasix [furosemide] Other (See Comments) 09/18/2013     Additional information:       MEDICATIONS   Aspirin  [] 81 mg  [] 325 mg  [] None  Antiplatelet drug therapy use last 5 days  []No []Yes  Coumadin Use Last 5 Days []No []Yes  Other anticoagulant use last 5 days  []No []Yes    Current Facility-Administered Medications:     nitroGLYCERIN (NITROSTAT) SL tablet 0.4 mg, 0.4 mg, Sublingual, Q5 Min PRN, [employer], PA-C, 0.4 mg at 08/02/21 0334    0.9 % sodium chloride infusion, , Intravenous, Continuous, Mainor Cali MD, Last Rate: 100 mL/hr at 08/02/21 1627, New Bag at 08/02/21 1627    morphine (PF) injection 2 mg, 2 mg, Intravenous, Q4H PRN, Judy Meza DO    heparin (porcine) injection 4,000 Units, 4,000 Units, Intravenous, PRN, Fidelina Vaughan MD    heparin (porcine) injection 2,000 Units, 2,000 Units, Intravenous, PRN, Fidelina Vaughan MD    heparin 25,000 units in dextrose 5% 250 mL (premix) infusion, 5-30 Units/kg/hr, Intravenous, Continuous, Mainor Cali MD, Last Rate: 10 mL/hr at 08/02/21 1621, 9.4 Units/kg/hr at 08/02/21 1621   atenolol (TENORMIN) tablet 50 mg, 50 mg, Oral, Daily, Afia Loss, PA-C, 50 mg at 08/01/21 0855    clopidogrel (PLAVIX) tablet 75 mg, 75 mg, Oral, Daily, Afia Loss, PA-C, 75 mg at 08/02/21 0913    famotidine (PEPCID) tablet 20 mg, 20 mg, Oral, BID PRN, Afia Loss, PA-C, 20 mg at 08/02/21 0331    glucose (GLUTOSE) 40 % oral gel 15 g, 15 g, Oral, PRN, Afia Loss, PA-C    dextrose 50 % IV solution, 12.5 g, Intravenous, PRN, Afia Loss, PA-C    glucagon (rDNA) injection 1 mg, 1 mg, Intramuscular, PRN, Afia Loss, PA-C    dextrose 5 % solution, 100 mL/hr, Intravenous, PRN, Afia Loss, PA-C    pantoprazole (PROTONIX) tablet 40 mg, 40 mg, Oral, BID AC, Karrie Pedro, PA-C, 40 mg at 08/02/21 1720    sucralfate (CARAFATE) tablet 1 g, 1 g, Oral, 4x Daily, Afia Loss, PA-C, 1 g at 08/02/21 1628    insulin lispro (HUMALOG) injection vial 0-6 Units, 0-6 Units, Subcutaneous, TID WC, Karrei Pedro, PA-C    insulin lispro (HUMALOG) injection vial 0-3 Units, 0-3 Units, Subcutaneous, Nightly, Afia Loss, PA-C    sodium chloride flush 0.9 % injection 5-40 mL, 5-40 mL, Intravenous, 2 times per day, Afia Loss, PA-C, 10 mL at 08/01/21 2020    sodium chloride flush 0.9 % injection 5-40 mL, 5-40 mL, Intravenous, PRN, Afia Loss, PA-C    0.9 % sodium chloride infusion, 25 mL, Intravenous, PRN, Afia Loss, PA-C    ondansetron (ZOFRAN-ODT) disintegrating tablet 4 mg, 4 mg, Oral, Q8H PRN **OR** ondansetron (ZOFRAN) injection 4 mg, 4 mg, Intravenous, Q6H PRN, Afia Loss, PA-C    acetaminophen (TYLENOL) tablet 650 mg, 650 mg, Oral, Q6H PRN, 650 mg at 08/02/21 1622 **OR** acetaminophen (TYLENOL) suppository 650 mg, 650 mg, Rectal, Q6H PRN, Afia Loss, PA-C    polyethylene glycol (GLYCOLAX) packet 17 g, 17 g, Oral, Daily PRN, Afia Loss, PA-C    aspirin chewable tablet 81 mg, 81 mg, Oral, Daily, Afia Loss, PA-C, 81 mg at 08/02/21 0913    atorvastatin (LIPITOR) tablet 80 mg, 80 mg, Oral, Nightly, [employer], PA-C, 80 mg at 08/01/21 2016    potassium chloride (KLOR-CON M) extended release tablet 40 mEq, 40 mEq, Oral, PRN **OR** potassium bicarb-citric acid (EFFER-K) effervescent tablet 40 mEq, 40 mEq, Oral, PRN **OR** potassium chloride 10 mEq/100 mL IVPB (Peripheral Line), 10 mEq, Intravenous, PRN, [employer], PA-C    magnesium sulfate 2000 mg in 50 mL IVPB premix, 2,000 mg, Intravenous, PRN, [employer], PA-C    nitroGLYCERIN 50 mg in dextrose 5% 250 mL infusion, 5-200 mcg/min, Intravenous, Continuous, [employer], PA-C, Last Rate: 6 mL/hr at 08/02/21 0556, 20 mcg/min at 08/02/21 0556    calcium carbonate (TUMS) chewable tablet 500 mg, 500 mg, Oral, TID PRN, [employer], PA-C, 500 mg at 08/01/21 0343    regadenoson (LEXISCAN) injection 0.4 mg, 0.4 mg, Intravenous, ONCE PRN, Cori Verma MD    losartan (COZAAR) tablet 100 mg, 100 mg, Oral, Daily, Lizette Angry, DO    NIFEdipine (PROCARDIA XL) extended release tablet 30 mg, 30 mg, Oral, Daily, Mark Anthony Sunshine DO, 30 mg at 08/01/21 1153    insulin glargine (LANTUS) injection vial 15 Units, 15 Units, Subcutaneous, Nightly, Lizette Angry, DO  Prior to Admission medications    Medication Sig Start Date End Date Taking? Authorizing Provider   pantoprazole (PROTONIX) 40 MG tablet Take 1 tablet by mouth 2 times daily 7/30/21  Yes Mo Higgins MD   losartan (COZAAR) 50 MG tablet Take 1 tablet by mouth daily 7/30/21  Yes Mo Higgins MD   famotidine (PEPCID) 20 MG tablet Take 1 tablet by mouth 2 times daily as needed (heartburn, indigestion) 7/29/21  Yes Prasanna Monique MD   sucralfate (CARAFATE) 1 GM tablet Take 1 tablet by mouth 4 times daily 7/29/21  Yes Prasanna Monique MD   metFORMIN (GLUCOPHAGE) 500 MG tablet Take 500 mg by mouth 2 times daily (with meals). Yes Historical Provider, MD   glimepiride (AMARYL) 2 MG tablet Take 2 mg by mouth every morning (before breakfast).    Yes Historical Provider, MD clopidogrel (PLAVIX) 75 MG tablet Take 75 mg by mouth daily. Yes Historical Provider, MD   atenolol (TENORMIN) 50 MG tablet Take 50 mg by mouth daily. Yes Historical Provider, MD   Blood Pressure Monitor KIT Use once daily 7/29/21   Melvina Pulse, MD   ibuprofen (ADVIL;MOTRIN) 800 MG tablet Take 800 mg by mouth every 12 hours. Twice per day    Historical Provider, MD     Additional information:       VITAL SIGNS   Vitals:    08/02/21 1720   BP: 97/64   Pulse:    Resp:    Temp:    SpO2:        PHYSICAL:   General: No acute distress  HEENT:  Unremarkable for age  Neck: without increased JVD, carotid pulses 2+ bilaterally without bruits  Heart: RRR, S1 & S2 WNL. No murmurs   Lungs: Clear to auscultation    Abdomen: BS present, without HSM, masses, or tenderness    Extremities: without C,C,E.  Pulses 2+ bilaterally   Mental Status: Alert & Oriented        PLANNED PROCEDURE   [x]Cath  [x]PCI                []Pacemaker/AICD  []LAZARUS             []Cardioversion []Peripheral angiography/PTA  []Other:      SEDATION  Planned agent:[x]Midazolam []Meperidine []Sublimaze []Morphine  []Diazepam  []Other:     ASA Classification:  []1 []2 [x]3 []4 []5   Class 1: A normal healthy patient  Class 2: Pt with mild to moderate systemic disease  Class 3: Severe systemic disease or disturbance  Class 4: Severe systemic disorders that are already life threatening. Class 5: Moribund pt with little chances of survival, for more than 24 hours. Mallampati I Airway Classification:   []1 [x]2 []3 []4     [x]Pre-procedure diagnostic studies complete and results available. Comment:    [x]Previous sedation/anesthesia experiences assessed. Comment:    [x]The patient is an appropriate candidate to undergo the planned procedure sedation and anesthesia.  (Refer to nursing sedation/analgesia documentation record)  [x]Formulation and discussion of sedation/procedure plan, risks, and expectations with patient and/or responsible adult completed. [x]Patient examined immediately prior to the procedure.  (Refer to nursing sedation/analgesia documentation record)      Pierre Schmidt MD, Eric Tejada    Electronically signed 8/2/2021 at 5:23 PM

## 2021-08-02 NOTE — BRIEF OP NOTE
[address] [address]  Sedation/Analgesia Post Sedation Record    Pt Name: Aurora Sendlee  Account number: [de-identified]  MRN: [id number]  YOB: 1965  Procedure Performed By: Geni Isaac MD MD   Primary Care Physician: Tessa Ta MD  Date: 8/2/2021    POST-PROCEDURE    Physicians/Assistants: Geni Isaac MD MD     Procedure Performed:Cath/ PCI      Sedation/Anesthesia: Versed/ Fentanyl and 2% xylocaine local anesthesia. Estimated Blood Loss: < 50 ml. Specimens Removed: None         Disposition of Specimen: N/A        Complications: No Immediate Complications. Post-procedure Diagnosis/Findings:        UA/ACS   Multivessel CAD      Recommendations:  Bed rest for the next 4 hours  Access site care  Medical therapy is recommended  Aggressive risk factor modification for CAD  ASA 81 mg PO daily   Patient is on Plavix for history CVA, continue for now   Statin therapy  Start Heparin gtt without an initial bolus (Start 4 hours after the sheath is removed)  Stop Lovenox   Continue NTG gtt, titrate to chest pain/BP control   Consult CV surgery. Heart team discussion regarding revascularization options, PCI Vs CABG       Above findings and plan of care were discussed with patient and his family, questions were answered, agreeable with plan.        Geni Isaac MD, C/ [address]   Electronically signed 8/2/2021 at 10:44 AM  Interventional Cardiology
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
## 2021-08-02 NOTE — PROCEDURES
[address] [place] [address]                            CARDIAC CATHETERIZATION    PATIENT NAME: Jasmin Sutherland                       :        1965  MED REC NO:   [id number]                           ROOM:       [id number]  ACCOUNT NO:   [de-identified]                           ADMIT DATE: 2021  PROVIDER:     Ricky Peters MD    DATE OF PROCEDURE:  2021    INDICATION:  This is a 47-year-old male patient with past medical  history that includes diabetes mellitus, hypertension, and prior history  of stroke. The patient had recent evaluation in the office for  recurrent chest pain. He was seen in office after he was seen in the  emergency room for similar complaints. Outpatient workup with stress  test and echocardiogram were planned. The patient however was admitted  to the hospital with worsening chest pain. Initially, his troponins  were negative. He did require nitroglycerin drip on admission which was  tapered off. The patient however developed severe chest pain last night  and had to be put back on nitroglycerin drip. The repeat troponin last  night showed minimal elevation at 0.011. The findings were consistent  with unstable angina/acute coronary syndrome. PROCEDURES PERFORMED:  1. Left cardiac catheterization with selective coronary angiogram.  2.  Left ventriculography. DESCRIPTION OF PROCEDURE:  After informed consent, the patient was  brought to the cardiac catheterization room. He was prepped and draped  in a sterile fashion. 2% lidocaine was injected in the skin and  subcutaneous tissue overlying the right groin area. Under ultrasound  and fluoroscopy guidance using modified Seldinger technique and  utilizing micropuncture kit, I was able to obtain access in the right  common femoral artery. Common femoral artery angiogram confirmed good  stick. 5-[place] sheath was inserted.   I did scan the right radial  artery with an ultrasound prior to obtaining access in the right groin. The patient has prior history of surgical intervention on the right  forearm where a skin/muscle graft was obtained. The right radial artery  could not be visualized on ultrasound. I then proceeded with left  cardiac catheterization through the 5-Malaysian right common femoral artery  sheath in the right groin. A 5-Malaysian 3DRC, 5-Malaysian JL4 diagnostic  catheters were used to complete the procedure. FINDINGS:  HEMODYNAMICS:  Left ventricular end-diastolic pressure 5 mmHg. No  significant pressure gradient across the aortic valve upon pullback. LEFT VENTRICULOGRAPHY:  There is no evidence for any regional wall  motion abnormality. Ejection fraction estimated at 55%. CORONARY ANGIOGRAM:  1.  DOMINANCE:  Codominant system. 2.  RIGHT CORONARY ARTERY:  Proximal RCA has luminal irregularities. Mid RCA has 70% to 80% segmental lesion. Distal RCA has mild luminal  irregularities. 3.  LEFT MAIN CORONARY ARTERY:  Distal left main coronary artery has  nonobstructive 10% to 30% stenosis. It bifurcates into left circumflex  and left anterior descending arteries. 3.  LEFT CIRCUMFLEX ARTERY:  Ostial left circumflex artery has 10% to  20% stenosis. There is a very small first obtuse marginal branch with  mild diffuse disease. Second obtuse marginal branch is a relatively  larger vessel with subtotal 99% stenosis. The left posterolateral  branch has 90% to 95% stenotic lesion. 4.  LEFT ANTERIOR DESCENDING ARTERY:  Proximal LAD is patent. Mid LAD  has an 80% lesion. Distal LAD has segmental 30% to 50% stenotic  lesions. MEDICATIONS:  See MAR. COMPLICATIONS:  None. ESTIMATED BLOOD LOSS:  Less than 50 mL. ACCESS:  Right common femoral artery access. Sheath was removed at the  end of the procedure and manual compression was applied. Hemostasis was  achieved. IMPRESSION:  1.   Unstable angina/acute coronary syndrome. 2.  Past medical history includes diabetes mellitus, cerebrovascular  accident, and hypertension. 3.  Multivessel coronary artery disease as detailed above. RECOMMENDATIONS:  1.  Bedrest for the next four hours. 2.  Continue to monitor the patient on telemetry. 3.  Continue inpatient care. 4.  Continue on aspirin and Plavix for now. 5.  Add heparin drip without an initial bolus four hours after sheath is  removed and hemostasis is achieved. 6.  May continue nitroglycerin drip, titrate dose as needed to achieve  chest pain and blood pressure control. 7.  Cardiovascular Surgery was consulted. Heart team discussion  regarding revascularization options, PCI versus CABG. Findings and plan of care were discussed with the patient and family. They are agreeable to the plan.         Thiago Waldrop MD    D: 08/02/2021 11:24:59       T: 08/02/2021 11:28:52     [id number]  Job#: [id number]     Doc#: [id number]    CC:

## 2021-08-02 NOTE — PROGRESS NOTES
Hospitalist Progress Note      Patient:  Giovanni Mendoza    Unit/Bed:3B-24/024-A  YOB: 1965  MRN: [id number]   Acct: [de-identified]   PCP: Nicki Ahmadi MD  Date of Admission: 8/1/2021    Assessment/Plan:    1. Acute coronary syndrome/unstable angina:  a. Patient continues to have recurrent chest pain with minimal exertion. Repeat EKG shows no acute ischemia. Chest x-ray is clear. b. Continue with nitroglycerin drip. IV morphine as needed for pain control. c. Patient taken for left heart catheterization with cardiology today. Significant for multivessel disease. Currently undergoing discussions and evaluation for CABG versus high risk multivessel PCI. d. Continue aspirin and Plavix. Continue Lipitor.  e. Will start heparin drip 4 hours following LHC sheath removal.  2. GERD:  a. Continue twice daily PPI therapy and Carafate  b. May ultimately require GI referral for endoscopic evaluation pending cardiac work-up and management. 3. HTN, uncontrolled  a. Remains on nitroglycerin drip.  b. Continue losartan 100 mg daily and nifedipine 30 mg daily. Continue home atenolol. 4. Diabetes mellitus type 2:   a. Basal/bolus insulin with Accu-Cheks AC at bedtime  b. Hold home glimepiride and Metformin    Disposition: Disposition likely home with family in 36 to 67 hours following ischemic and likely gastroenterology evaluations. Chief Complaint: Chest pain    Hospital Course:    Patient is a very pleasant 12-year-old male with multiple coronary artery disease risk factors include diabetes, obesity, male gender, and uncontrolled hypertension. The patient has had persistent chest pain for several weeks and was recently evaluated in the ER. At that time, he was prescribed PPI twice daily, H2 blocker, and Carafate.   His symptoms have persisted over the preceding several days, typically following a meal and described as a burning sensation with some radiation to his left arm. Patient is able to exercise without chest pain. He was scheduled to have a stress test completed on Monday, 8/2, to further evaluate. Subjective (past 24 hours):   Overnight/early this morning, the patient noted to have increased chest pain described as a burning sensation with radiation to his left arm after returning from the restroom. EKG and chest x-ray completed. Patient placed back on nitroglycerin drip. Patient states he has very mild discomfort on the left side of his chest at this time. He denies any nausea or diaphoresis. Past medical history, family history, social history and allergies reviewed again and is unchanged since admission. ROS (12 point review of systems completed. Pertinent positives noted.  Otherwise ROS is negative)     Medications:  Reviewed    Infusion Medications    sodium chloride      dextrose      sodium chloride      nitroGLYCERIN 20 mcg/min (08/02/21 0556)     Scheduled Medications    atenolol  50 mg Oral Daily    clopidogrel  75 mg Oral Daily    pantoprazole  40 mg Oral BID AC    sucralfate  1 g Oral 4x Daily    insulin lispro  0-6 Units Subcutaneous TID WC    insulin lispro  0-3 Units Subcutaneous Nightly    sodium chloride flush  5-40 mL Intravenous 2 times per day    aspirin  81 mg Oral Daily    atorvastatin  80 mg Oral Nightly    losartan  100 mg Oral Daily    NIFEdipine  30 mg Oral Daily    insulin glargine  15 Units Subcutaneous Nightly     PRN Meds: sodium chloride flush, sodium chloride, albuterol sulfate HFA, atropine, nitroGLYCERIN, metoprolol, aminophylline, nitroGLYCERIN, famotidine, glucose, dextrose, glucagon (rDNA), dextrose, sodium chloride flush, sodium chloride, ondansetron **OR** ondansetron, acetaminophen **OR** acetaminophen, polyethylene glycol, potassium chloride **OR** potassium alternative oral replacement **OR** potassium chloride, magnesium sulfate, calcium carbonate, regadenoson      Intake/Output Summary (Last 24 hours) at 8/2/2021 1123  Last data filed at 8/2/2021 7405  Gross per 24 hour   Intake 612.58 ml   Output --   Net 612.58 ml       Diet:  Diet NPO    Exam:  /74   Pulse 69   Temp 97.7 °F (36.5 °C) (Oral)   Resp 18   Ht 5' 10\" (1.778 m)   Wt 235 lb 1.6 oz (106.6 kg)   SpO2 94%   BMI 33.73 kg/m²   General appearance: No apparent distress, appears stated age and cooperative. HEENT: Pupils equal, round, and reactive to light. Conjunctivae/corneas clear. Neck: Supple, with full range of motion. No jugular venous distention. Trachea midline. Respiratory:  Normal respiratory effort. Clear to auscultation, bilaterally without Rales/Wheezes/Rhonchi. Cardiovascular: Regular rate and rhythm with normal S1/S2 without murmurs, rubs or gallops. Abdomen: Soft, non-tender, non-distended with normal bowel sounds. Musculoskeletal: passive and active ROM x 4 extremities. Skin: Multiple chronic skin lesions throughout the body from prior surgeries. No active skin lesions or rashes. No drainage or erythema. Neurologic:  Neurovascularly intact without any focal sensory/motor deficits. Cranial nerves: II-XII intact, grossly non-focal.  Psychiatric: Alert and oriented, thought content appropriate, normal insight  Capillary Refill: Brisk,< 3 seconds   Peripheral Pulses: +2 palpable, equal bilaterally     Labs:   Recent Labs     08/01/21  0338 08/02/21  0357   WBC 12.7* 10.2   HGB 15.0 15.4   HCT 46.5 48.3    228     Recent Labs     08/01/21  0735 08/02/21  0356    137   K 4.1 3.8    104   CO2 22* 22*   BUN 22 27*   CREATININE 0.8 0.8   CALCIUM 9.3 9.0     No results for input(s): AST, ALT, BILIDIR, BILITOT, ALKPHOS in the last 72 hours. No results for input(s): INR in the last 72 hours. No results for input(s): [place] in the last 72 hours.     Microbiology:    Blood culture #1: No results found for: BC    Blood culture #2:No results found for: Praveena Arora    Organism:No results found for: ORG    No results found for: LABGRAM    MRSA culture only:No results found for: [facility]    Urine culture: No results found for: LABURIN    Respiratory culture: No results found for: CULTRESP    Aerobic and Anaerobic :  No results found for: LABAERO  No results found for: LABANAE    Urinalysis:    No results found for: Jane Latonia, BACTERIA, RBCUA, BLOODU, SPECGRAV, GLUCOSEU    Radiology:  XR CHEST PORTABLE   Final Result   Impression:   1. No acute cardiopulmonary disease. This document has been electronically signed by: Ken Keith MD on    08/02/2021 08:08 AM        No results found.     Electronically signed by Mandie Frias DO on 8/2/2021 at 11:23 AM
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
## 2021-08-02 NOTE — CARE COORDINATION
8/2/21, 9:38 AM EDT  DISCHARGE PLANNING EVALUATION:    Juna Cooks       Admitted: 8/1/2021/ 101 TPACK day: 1   Location: [facility] Reason for admit: Chest pain [R07.9]   PMH:  has a past medical history of Arthritis, Diabetes mellitus ([address] Hypertension, Pneumonia, and Unspecified cerebral artery occlusion with cerebral infarction. Procedure:   8/2 Echo - pending results  8/2 Cardiac cath   Barriers to Discharge:  Pt transferred from [facility] with chest pain, and indigestion that has worsened over the last month. States the indigestion worsens with activity not with eating. Troponins negative x4, and then 0.011 this am after new CP. Stress test was cancelled and pt taken to cath lab today. Hospitalist and Cardiology following. Baby ASA, Tenormin, Lipitor, Tums prn, Plavix, Lovenox, Pepcid prn, diabetes management, Cozaar, electrolyte replacements, Procardia XL, NTG SL prn, NTG gtt, Protonix po, Carafate. BP overnight high of 204/99. PCP: Everardo العراقي MD  Readmission Risk Score: 10%    Patient Goals/Plan/Treatment Preferences: Spoke with Cirilo Muñoz. He lives at home with his wife. He states he is independent and drives. He has a cane at home, but no other DME. Denies [place] or the need for it. Verified his insurance and PCP. Monitor for needs. Transportation/Food Security/Housekeeping Addressed:  No issues identified. Face Mask

## 2021-08-02 NOTE — PROGRESS NOTES
Ref. Range 8/2/2021 03:57   Troponin T Latest Units: ng/ml 0.011 (A)     Hospitalist [facility] notified. Nitro gtt infusing at 25 mcg/min - Arm and chest pain still a 4 to 5.

## 2021-08-02 NOTE — H&P
[address] [address]  Sedation/Analgesia History & Physical    Pt Name: Shamir Bustillo  Account number: [de-identified]  MRN: [id number]  YOB: 1965  Provider Performing Procedure: Cb Jorge MD MD  Referring Provider: Lorette Epley, DO   Primary Care Physician: Cynthia Acevedo MD  Date: 8/2/2021    PRE-PROCEDURE    Code Status: FULL CODE  Brief History/Pre-Procedure Diagnosis:     Recurrent chest pain  Now admitted with increased chest pain, required NTG gtt  Unstable Angina/ACS  Mild increase in Troponin    PMH includes CVA, DM, HTN    Consent: : I have discussed with the patient risks, benefits, and alternatives (and relevant risks, benefits, and side effects related to alternatives or not receiving care), and likelihood of the success. The patient and/or representative appear to understand and agree to proceed. The discussion encompasses risks, benefits, and side effects related to the alternatives and the risks related to not receiving the proposed care, treatment, and services. The indication, risks and benefits of the procedure and possible therapeutic consequences and alternatives were discussed with the patient. The patient was given the opportunity to ask questions and to have them answered to his/her satisfaction.  Risks of the procedure include but are not limited to the following: Bleeding, hematoma including retroperitoneal hemmorhage, infection, pain and discomfort, injury to the aorta and other blood vessels, rhythm disturbance, low blood pressure, myocardial infarction, stroke, kidney damage/failure, myocardial perforation, allergic reactions to sedatives/contrast material, loss of pulse/vascular compromise requiring surgery, aneurysm/pseudoaneurysm formation, possible loss of a limb/hand/leg, needing blood transfusion, requiring emergent open heart surgery or emergent coronary intervention, the need for intubation/respiratory support, the requirement for defibrillation/cardioversion, and death. Alternatives to and omission of the suggested procedure were discussed. The patient had no further questions and wished to proceed; the consent form was signed. MEDICAL HISTORY  []ASHD/ANGINA/MI/CHF   []Hypertension  []Diabetes  []Hyperlipidemia  []Smoking  []Family Hx of ASHD  []Additional information:       has a past medical history of Arthritis, Diabetes mellitus ([address] Hypertension, Pneumonia, and Unspecified cerebral artery occlusion with cerebral infarction. SURGICAL HISTORY   has a past surgical history that includes Abdomen surgery; fracture surgery; Femur fracture surgery (Left); Tibia fracture surgery (Right); Toe amputation (Left); Vena Cava Filter Placement; and Skin graft.   Additional information:       ALLERGIES   Allergies as of 07/31/2021 - Fully Reviewed 07/30/2021   Allergen Reaction Noted    Bactrim [sulfamethoxazole-trimethoprim] Swelling 09/18/2013    Lasix [furosemide] Other (See Comments) 09/18/2013     Additional information:       MEDICATIONS   Aspirin  [] 81 mg  [] 325 mg  [] None  Antiplatelet drug therapy use last 5 days  []No []Yes  Coumadin Use Last 5 Days []No []Yes  Other anticoagulant use last 5 days  []No []Yes    Current Facility-Administered Medications:     sodium chloride flush 0.9 % injection 5-40 mL, 5-40 mL, Intravenous, PRN, Stevo Valenzuela MD    0.9 % sodium chloride infusion, 500 mL, Intravenous, Continuous PRN, Stevo Valenzuela MD    albuterol sulfate  (90 Base) MCG/ACT inhaler 2 puff, 2 puff, Inhalation, PRN, Stevo Valenzuela MD    atropine injection 0.5 mg, 0.5 mg, Intravenous, Q5 Min PRN, Stevo Valenzuela MD    nitroGLYCERIN (NITROSTAT) SL tablet 0.4 mg, 0.4 mg, Sublingual, Q5 Min PRN, Stevo Valenzuela MD    metoprolol (LOPRESSOR) injection 5 mg, 5 mg, Intravenous, Q5 Min PRN, Stevo Valenzuela MD    aminophylline injection 50 mg, 50 mg, Intravenous, PRN, Stevo Valenzuela MD  [facility] nitroGLYCERIN (NITROSTAT) SL tablet 0.4 mg, 0.4 mg, Sublingual, Q5 Min PRN, [employer], PA-C, 0.4 mg at 08/02/21 0334    atenolol (TENORMIN) tablet 50 mg, 50 mg, Oral, Daily, [employer], PA-C, 50 mg at 08/01/21 0855    clopidogrel (PLAVIX) tablet 75 mg, 75 mg, Oral, Daily, [employer], PA-C, 75 mg at 08/01/21 0855    famotidine (PEPCID) tablet 20 mg, 20 mg, Oral, BID PRN, [employer], PA-C, 20 mg at 08/02/21 0331    glucose (GLUTOSE) 40 % oral gel 15 g, 15 g, Oral, PRN, [employer], PA-C    dextrose 50 % IV solution, 12.5 g, Intravenous, PRN, [employer], PA-C    glucagon (rDNA) injection 1 mg, 1 mg, Intramuscular, PRN, [employer], PA-C    dextrose 5 % solution, 100 mL/hr, Intravenous, PRN, [employer], PA-C    pantoprazole (PROTONIX) tablet 40 mg, 40 mg, Oral, BID AC, Karrie Pedro, PA-C, 40 mg at 08/02/21 0406    sucralfate (CARAFATE) tablet 1 g, 1 g, Oral, 4x Daily, [employer], PA-C, 1 g at 08/01/21 2020    insulin lispro (HUMALOG) injection vial 0-6 Units, 0-6 Units, Subcutaneous, TID WC, Karrie HAVEN Pedro PA-C    insulin lispro (HUMALOG) injection vial 0-3 Units, 0-3 Units, Subcutaneous, Nightly, [employer], PA-C    sodium chloride flush 0.9 % injection 5-40 mL, 5-40 mL, Intravenous, 2 times per day, [employer], PA-C, 10 mL at 08/01/21 2020    sodium chloride flush 0.9 % injection 5-40 mL, 5-40 mL, Intravenous, PRN, [employer], PA-C    0.9 % sodium chloride infusion, 25 mL, Intravenous, PRN, [employer], PA-C    ondansetron (ZOFRAN-ODT) disintegrating tablet 4 mg, 4 mg, Oral, Q8H PRN **OR** ondansetron (ZOFRAN) injection 4 mg, 4 mg, Intravenous, Q6H PRN, [employer], PA-C    acetaminophen (TYLENOL) tablet 650 mg, 650 mg, Oral, Q6H PRN, 650 mg at 08/02/21 0404 **OR** acetaminophen (TYLENOL) suppository 650 mg, 650 mg, Rectal, Q6H PRN, [employer], PA-C    polyethylene glycol (GLYCOLAX) packet 17 g, 17 g, Oral, Daily PRN, [employer], PA-C    aspirin chewable tablet 81 mg, 81 mg, Oral, Daily, [employer], PA-C    atorvastatin (LIPITOR) tablet 80 mg, 80 mg, Oral, Nightly, [employer], PA-C, 80 mg at 08/01/21 2016    potassium chloride (KLOR-CON M) extended release tablet 40 mEq, 40 mEq, Oral, PRN **OR** potassium bicarb-citric acid (EFFER-K) effervescent tablet 40 mEq, 40 mEq, Oral, PRN **OR** potassium chloride 10 mEq/100 mL IVPB (Peripheral Line), 10 mEq, Intravenous, PRN, [employer], PA-C    magnesium sulfate 2000 mg in 50 mL IVPB premix, 2,000 mg, Intravenous, PRN, [employer], PA-C    enoxaparin (LOVENOX) injection 40 mg, 40 mg, Subcutaneous, Daily, [employer], PA-C, 40 mg at 08/01/21 0855    nitroGLYCERIN 50 mg in dextrose 5% 250 mL infusion, 5-200 mcg/min, Intravenous, Continuous, [employer], PA-C, Last Rate: 6 mL/hr at 08/02/21 0556, 20 mcg/min at 08/02/21 0556    calcium carbonate (TUMS) chewable tablet 500 mg, 500 mg, Oral, TID PRN, [employer], PA-C, 500 mg at 08/01/21 0343    regadenoson (LEXISCAN) injection 0.4 mg, 0.4 mg, Intravenous, ONCE PRN, Jorge Maxwell MD    losartan (COZAAR) tablet 100 mg, 100 mg, Oral, Daily, Alkashmir Hem, DO    NIFEdipine (PROCARDIA XL) extended release tablet 30 mg, 30 mg, Oral, Daily, Mark Anthony Sunshine, , 30 mg at 08/01/21 1153    insulin glargine (LANTUS) injection vial 15 Units, 15 Units, Subcutaneous, Nightly, Judy Hem, DO  Prior to Admission medications    Medication Sig Start Date End Date Taking?  Authorizing Provider   pantoprazole (PROTONIX) 40 MG tablet Take 1 tablet by mouth 2 times daily 7/30/21  Yes Melissa Cortez MD   losartan (COZAAR) 50 MG tablet Take 1 tablet by mouth daily 7/30/21  Yes Melissa Cortez MD   famotidine (PEPCID) 20 MG tablet Take 1 tablet by mouth 2 times daily as needed (heartburn, indigestion) 7/29/21  Yes Elvia Forman MD   sucralfate (CARAFATE) 1 GM tablet Take 1 tablet by mouth 4 times daily 7/29/21  Yes Elvia Forman MD   metFORMIN

## 2021-08-02 NOTE — FLOWSHEET NOTE
08/02/21 0325 08/02/21 0329 08/02/21 0330   Vitals   Pulse 65  --  66   BP (!) 204/99   (1 s/l Ntg given) (!) 188/92   MAP (mmHg) 142  --  132     0325  Pt called out complaining of CP after walking back to bed from bathroom - /99 - CP 10/10. Gave sublingual nitro x2. Ordered EKG per protocol. CP decreased to 5. Notified [facility]. Restarted Nitro gtt. 0415 Pt then complained of left arm pain - updated [facility] - ordered received for 2mg IV Morphine. 0431 /74 , HR 63.  CP and arm pain improving. [facility]  updated on pt status. No new orders at this time.

## 2021-08-03 ENCOUNTER — APPOINTMENT (OUTPATIENT)
Dept: CARDIAC CATH/INVASIVE PROCEDURES | Age: 56
DRG: 246 | End: 2021-08-03
Attending: FAMILY MEDICINE
Payer: MEDICARE

## 2021-08-03 LAB
ABO: NORMAL
ANION GAP SERPL CALCULATED.3IONS-SCNC: 12 MEQ/L (ref 8–16)
ANTIBODY SCREEN: NORMAL
APTT: 51.5 SECONDS (ref 22–38)
BASOPHILS # BLD: 0.7 %
BASOPHILS ABSOLUTE: 0.1 THOU/MM3 (ref 0–0.1)
BUN BLDV-MCNC: 22 MG/DL (ref 7–22)
CALCIUM SERPL-MCNC: 8.7 MG/DL (ref 8.5–10.5)
CHLORIDE BLD-SCNC: 107 MEQ/L (ref 98–111)
CO2: 21 MEQ/L (ref 23–33)
CREAT SERPL-MCNC: 0.8 MG/DL (ref 0.4–1.2)
EKG ATRIAL RATE: 57 BPM
EKG ATRIAL RATE: 60 BPM
EKG P AXIS: 47 DEGREES
EKG P AXIS: 67 DEGREES
EKG P-R INTERVAL: 170 MS
EKG P-R INTERVAL: 182 MS
EKG Q-T INTERVAL: 412 MS
EKG Q-T INTERVAL: 412 MS
EKG QRS DURATION: 94 MS
EKG QRS DURATION: 98 MS
EKG QTC CALCULATION (BAZETT): 401 MS
EKG QTC CALCULATION (BAZETT): 412 MS
EKG R AXIS: 61 DEGREES
EKG R AXIS: 76 DEGREES
EKG T AXIS: 36 DEGREES
EKG T AXIS: 43 DEGREES
EKG VENTRICULAR RATE: 57 BPM
EKG VENTRICULAR RATE: 60 BPM
EOSINOPHIL # BLD: 5.6 %
EOSINOPHILS ABSOLUTE: 0.5 THOU/MM3 (ref 0–0.4)
ERYTHROCYTE [DISTWIDTH] IN BLOOD BY AUTOMATED COUNT: 13.4 % (ref 11.5–14.5)
ERYTHROCYTE [DISTWIDTH] IN BLOOD BY AUTOMATED COUNT: 45.1 FL (ref 35–45)
GFR SERPL CREATININE-BSD FRML MDRD: > 90 ML/MIN/1.73M2
GLUCOSE BLD-MCNC: 129 MG/DL (ref 70–108)
GLUCOSE BLD-MCNC: 143 MG/DL (ref 70–108)
GLUCOSE BLD-MCNC: 153 MG/DL (ref 70–108)
GLUCOSE BLD-MCNC: 158 MG/DL (ref 70–108)
GLUCOSE BLD-MCNC: 172 MG/DL (ref 70–108)
HCT VFR BLD CALC: 44.5 % (ref 42–52)
HEMOGLOBIN: 14.8 GM/DL (ref 14–18)
IMMATURE GRANS (ABS): 0.04 THOU/MM3 (ref 0–0.07)
IMMATURE GRANULOCYTES: 0.4 %
INR BLD: 0.98 (ref 0.85–1.13)
LYMPHOCYTES # BLD: 22.5 %
LYMPHOCYTES ABSOLUTE: 2.2 THOU/MM3 (ref 1–4.8)
MCH RBC QN AUTO: 30.6 PG (ref 26–33)
MCHC RBC AUTO-ENTMCNC: 33.3 GM/DL (ref 32.2–35.5)
MCV RBC AUTO: 91.9 FL (ref 80–94)
MONOCYTES # BLD: 7.7 %
MONOCYTES ABSOLUTE: 0.7 THOU/MM3 (ref 0.4–1.3)
NUCLEATED RED BLOOD CELLS: 0 /100 WBC
PLATELET # BLD: 213 THOU/MM3 (ref 130–400)
PMV BLD AUTO: 9.9 FL (ref 9.4–12.4)
POC ACTIVATED CLOTTING TIME KAOLIN: 257 SECONDS (ref 1–150)
POC ACTIVATED CLOTTING TIME KAOLIN: 318 SECONDS (ref 1–150)
POTASSIUM SERPL-SCNC: 3.7 MEQ/L (ref 3.5–5.2)
RBC # BLD: 4.84 MILL/MM3 (ref 4.7–6.1)
RH FACTOR: NORMAL
SEG NEUTROPHILS: 63.1 %
SEGMENTED NEUTROPHILS ABSOLUTE COUNT: 6.1 THOU/MM3 (ref 1.8–7.7)
SODIUM BLD-SCNC: 140 MEQ/L (ref 135–145)
WBC # BLD: 9.7 THOU/MM3 (ref 4.8–10.8)

## 2021-08-03 PROCEDURE — 6370000000 HC RX 637 (ALT 250 FOR IP)

## 2021-08-03 PROCEDURE — 6360000002 HC RX W HCPCS: Performed by: PHYSICIAN ASSISTANT

## 2021-08-03 PROCEDURE — C1887 CATHETER, GUIDING: HCPCS

## 2021-08-03 PROCEDURE — 6360000004 HC RX CONTRAST MEDICATION: Performed by: STUDENT IN AN ORGANIZED HEALTH CARE EDUCATION/TRAINING PROGRAM

## 2021-08-03 PROCEDURE — 6370000000 HC RX 637 (ALT 250 FOR IP): Performed by: PHYSICIAN ASSISTANT

## 2021-08-03 PROCEDURE — 027237Z DILATION OF CORONARY ARTERY, THREE ARTERIES WITH FOUR OR MORE DRUG-ELUTING INTRALUMINAL DEVICES, PERCUTANEOUS APPROACH: ICD-10-PCS | Performed by: FAMILY MEDICINE

## 2021-08-03 PROCEDURE — 6360000002 HC RX W HCPCS: Performed by: STUDENT IN AN ORGANIZED HEALTH CARE EDUCATION/TRAINING PROGRAM

## 2021-08-03 PROCEDURE — 92928 PRQ TCAT PLMT NTRAC ST 1 LES: CPT

## 2021-08-03 PROCEDURE — C1725 CATH, TRANSLUMIN NON-LASER: HCPCS

## 2021-08-03 PROCEDURE — C1769 GUIDE WIRE: HCPCS

## 2021-08-03 PROCEDURE — 86901 BLOOD TYPING SEROLOGIC RH(D): CPT

## 2021-08-03 PROCEDURE — 2580000003 HC RX 258: Performed by: PHYSICIAN ASSISTANT

## 2021-08-03 PROCEDURE — 2140000000 HC CCU INTERMEDIATE R&B

## 2021-08-03 PROCEDURE — 93005 ELECTROCARDIOGRAM TRACING: CPT | Performed by: PHYSICIAN ASSISTANT

## 2021-08-03 PROCEDURE — C1874 STENT, COATED/COV W/DEL SYS: HCPCS

## 2021-08-03 PROCEDURE — 6360000002 HC RX W HCPCS

## 2021-08-03 PROCEDURE — 2500000003 HC RX 250 WO HCPCS

## 2021-08-03 PROCEDURE — 92928 PRQ TCAT PLMT NTRAC ST 1 LES: CPT | Performed by: INTERNAL MEDICINE

## 2021-08-03 PROCEDURE — C1760 CLOSURE DEV, VASC: HCPCS

## 2021-08-03 PROCEDURE — 92929 PR PRQ TRLUML CORONARY STENT W/ANGIO ADDL ART/BRNCH: CPT | Performed by: INTERNAL MEDICINE

## 2021-08-03 PROCEDURE — 85347 COAGULATION TIME ACTIVATED: CPT

## 2021-08-03 PROCEDURE — 2580000003 HC RX 258: Performed by: INTERNAL MEDICINE

## 2021-08-03 PROCEDURE — 85610 PROTHROMBIN TIME: CPT

## 2021-08-03 PROCEDURE — 99232 SBSQ HOSP IP/OBS MODERATE 35: CPT | Performed by: STUDENT IN AN ORGANIZED HEALTH CARE EDUCATION/TRAINING PROGRAM

## 2021-08-03 PROCEDURE — 99223 1ST HOSP IP/OBS HIGH 75: CPT | Performed by: STUDENT IN AN ORGANIZED HEALTH CARE EDUCATION/TRAINING PROGRAM

## 2021-08-03 PROCEDURE — 6370000000 HC RX 637 (ALT 250 FOR IP): Performed by: INTERNAL MEDICINE

## 2021-08-03 PROCEDURE — 85730 THROMBOPLASTIN TIME PARTIAL: CPT

## 2021-08-03 PROCEDURE — 80048 BASIC METABOLIC PNL TOTAL CA: CPT

## 2021-08-03 PROCEDURE — 92929 HC PRQ CARD STENT W/ANGIO ADDL: CPT

## 2021-08-03 PROCEDURE — 86900 BLOOD TYPING SEROLOGIC ABO: CPT

## 2021-08-03 PROCEDURE — 36415 COLL VENOUS BLD VENIPUNCTURE: CPT

## 2021-08-03 PROCEDURE — C1894 INTRO/SHEATH, NON-LASER: HCPCS

## 2021-08-03 PROCEDURE — 6370000000 HC RX 637 (ALT 250 FOR IP): Performed by: STUDENT IN AN ORGANIZED HEALTH CARE EDUCATION/TRAINING PROGRAM

## 2021-08-03 PROCEDURE — 86850 RBC ANTIBODY SCREEN: CPT

## 2021-08-03 PROCEDURE — 82948 REAGENT STRIP/BLOOD GLUCOSE: CPT

## 2021-08-03 PROCEDURE — 85025 COMPLETE CBC W/AUTO DIFF WBC: CPT

## 2021-08-03 RX ORDER — SODIUM CHLORIDE 9 MG/ML
INJECTION, SOLUTION INTRAVENOUS CONTINUOUS
Status: DISCONTINUED | OUTPATIENT
Start: 2021-08-03 | End: 2021-08-04 | Stop reason: HOSPADM

## 2021-08-03 RX ORDER — ASPIRIN 81 MG/1
81 TABLET, CHEWABLE ORAL DAILY
Status: DISCONTINUED | OUTPATIENT
Start: 2021-08-03 | End: 2021-08-03

## 2021-08-03 RX ORDER — SODIUM CHLORIDE 0.9 % (FLUSH) 0.9 %
5-40 SYRINGE (ML) INJECTION PRN
Status: DISCONTINUED | OUTPATIENT
Start: 2021-08-03 | End: 2021-08-04 | Stop reason: HOSPADM

## 2021-08-03 RX ORDER — NITROGLYCERIN 0.4 MG/1
0.4 TABLET SUBLINGUAL EVERY 5 MIN PRN
Status: DISCONTINUED | OUTPATIENT
Start: 2021-08-03 | End: 2021-08-04 | Stop reason: HOSPADM

## 2021-08-03 RX ORDER — SODIUM CHLORIDE 0.9 % (FLUSH) 0.9 %
5-40 SYRINGE (ML) INJECTION EVERY 12 HOURS SCHEDULED
Status: DISCONTINUED | OUTPATIENT
Start: 2021-08-03 | End: 2021-08-03 | Stop reason: SDUPTHER

## 2021-08-03 RX ORDER — SODIUM CHLORIDE 0.9 % (FLUSH) 0.9 %
5-40 SYRINGE (ML) INJECTION EVERY 12 HOURS SCHEDULED
Status: DISCONTINUED | OUTPATIENT
Start: 2021-08-03 | End: 2021-08-04 | Stop reason: HOSPADM

## 2021-08-03 RX ORDER — ACETAMINOPHEN 325 MG/1
650 TABLET ORAL EVERY 4 HOURS PRN
Status: DISCONTINUED | OUTPATIENT
Start: 2021-08-03 | End: 2021-08-03 | Stop reason: SDUPTHER

## 2021-08-03 RX ORDER — SODIUM CHLORIDE 0.9 % (FLUSH) 0.9 %
5-40 SYRINGE (ML) INJECTION PRN
Status: DISCONTINUED | OUTPATIENT
Start: 2021-08-03 | End: 2021-08-03 | Stop reason: SDUPTHER

## 2021-08-03 RX ORDER — ASPIRIN 81 MG/1
81 TABLET ORAL DAILY
Status: DISCONTINUED | OUTPATIENT
Start: 2021-08-04 | End: 2021-08-04 | Stop reason: HOSPADM

## 2021-08-03 RX ORDER — ASPIRIN 325 MG
325 TABLET ORAL ONCE
Status: DISCONTINUED | OUTPATIENT
Start: 2021-08-03 | End: 2021-08-03

## 2021-08-03 RX ORDER — SODIUM CHLORIDE 9 MG/ML
25 INJECTION, SOLUTION INTRAVENOUS PRN
Status: DISCONTINUED | OUTPATIENT
Start: 2021-08-03 | End: 2021-08-04 | Stop reason: HOSPADM

## 2021-08-03 RX ORDER — SODIUM CHLORIDE 9 MG/ML
25 INJECTION, SOLUTION INTRAVENOUS PRN
Status: DISCONTINUED | OUTPATIENT
Start: 2021-08-03 | End: 2021-08-03 | Stop reason: SDUPTHER

## 2021-08-03 RX ORDER — ASPIRIN 81 MG/1
81 TABLET, CHEWABLE ORAL ONCE
Status: COMPLETED | OUTPATIENT
Start: 2021-08-03 | End: 2021-08-03

## 2021-08-03 RX ORDER — SODIUM CHLORIDE 9 MG/ML
INJECTION, SOLUTION INTRAVENOUS CONTINUOUS
Status: DISCONTINUED | OUTPATIENT
Start: 2021-08-03 | End: 2021-08-03 | Stop reason: SDUPTHER

## 2021-08-03 RX ORDER — CARVEDILOL 6.25 MG/1
12.5 TABLET ORAL 2 TIMES DAILY WITH MEALS
Status: DISCONTINUED | OUTPATIENT
Start: 2021-08-03 | End: 2021-08-04 | Stop reason: HOSPADM

## 2021-08-03 RX ORDER — NIFEDIPINE 60 MG/1
60 TABLET, EXTENDED RELEASE ORAL DAILY
Status: DISCONTINUED | OUTPATIENT
Start: 2021-08-04 | End: 2021-08-04 | Stop reason: HOSPADM

## 2021-08-03 RX ADMIN — MORPHINE SULFATE 2 MG: 2 INJECTION, SOLUTION INTRAMUSCULAR; INTRAVENOUS at 09:17

## 2021-08-03 RX ADMIN — PANTOPRAZOLE SODIUM 40 MG: 40 TABLET, DELAYED RELEASE ORAL at 16:58

## 2021-08-03 RX ADMIN — ONDANSETRON 4 MG: 2 INJECTION INTRAMUSCULAR; INTRAVENOUS at 13:27

## 2021-08-03 RX ADMIN — SUCRALFATE 1 G: 1 TABLET ORAL at 14:24

## 2021-08-03 RX ADMIN — CLOPIDOGREL BISULFATE 75 MG: 75 TABLET ORAL at 06:40

## 2021-08-03 RX ADMIN — TICAGRELOR 90 MG: 90 TABLET ORAL at 22:06

## 2021-08-03 RX ADMIN — CARVEDILOL 12.5 MG: 6.25 TABLET, FILM COATED ORAL at 16:58

## 2021-08-03 RX ADMIN — ATORVASTATIN CALCIUM 80 MG: 80 TABLET, FILM COATED ORAL at 22:06

## 2021-08-03 RX ADMIN — PANTOPRAZOLE SODIUM 40 MG: 40 TABLET, DELAYED RELEASE ORAL at 05:59

## 2021-08-03 RX ADMIN — CARVEDILOL 12.5 MG: 6.25 TABLET, FILM COATED ORAL at 11:10

## 2021-08-03 RX ADMIN — SODIUM CHLORIDE: 9 INJECTION, SOLUTION INTRAVENOUS at 05:59

## 2021-08-03 RX ADMIN — SODIUM CHLORIDE, PRESERVATIVE FREE 10 ML: 5 INJECTION INTRAVENOUS at 22:07

## 2021-08-03 RX ADMIN — SUCRALFATE 1 G: 1 TABLET ORAL at 09:17

## 2021-08-03 RX ADMIN — LOSARTAN POTASSIUM 100 MG: 100 TABLET, FILM COATED ORAL at 09:17

## 2021-08-03 RX ADMIN — SUCRALFATE 1 G: 1 TABLET ORAL at 16:58

## 2021-08-03 RX ADMIN — ASPIRIN 81 MG: 81 TABLET, CHEWABLE ORAL at 05:59

## 2021-08-03 RX ADMIN — IOPAMIDOL 250 ML: 755 INJECTION, SOLUTION INTRAVENOUS at 08:43

## 2021-08-03 RX ADMIN — SUCRALFATE 1 G: 1 TABLET ORAL at 22:06

## 2021-08-03 ASSESSMENT — PAIN SCALES - GENERAL
PAINLEVEL_OUTOF10: 0
PAINLEVEL_OUTOF10: 10

## 2021-08-03 NOTE — CARE COORDINATION
8/3/21, 1:26 PM EDT    DISCHARGE ON [address] day: 2  Location: [facility]24/024-A Reason for admit: Chest pain [R07.9]   Procedure:   8/2 Echo - EF 55-60%. 8/2 Cardiac cath - multi-vessel disease. 8/3 Cardiac cath - Successful PCI and stenting of the second obtuse marginal branch, the left posterolateral branch, the left anterior descending artery. Barriers to Discharge: Hospitalist, Cardiology following. Pt had cardiac cath yesterday, consulted [facility]. Pt chose to do high risk stenting, done today (see above). Pt will need staged PCI of RCA as OP in 1-2 months. IVF. Lipitor, Tums prn, Coreg, Pepcid prn, Lantus, Humalog, Cozaar, pain control, nausea control, Protonix bid, Carafate. Adding baby ASA, Procardia XL, Brilinta. PCP: Sonia Cee MD  Readmission Risk Score: 9%  Patient Goals/Plan/Treatment Preferences: Plans home with wife. Monitor for needs. Anticipate discharge home tomorrow if stable.

## 2021-08-03 NOTE — BRIEF OP NOTE
[facility]  Sedation/Analgesia Post Sedation Record    Pt Name: Bhavna Rivera  Account number: [de-identified]  MRN: [id number]  YOB: 1965  Procedure Performed By: Jerline Peabody, MD MD   Primary Care Physician: Elayne Ocampo MD  Date: 8/3/2021    POST-PROCEDURE    Physicians/Assistants: Jerline Peabody, MD MD     Procedure Performed:Cath/ PCI      Sedation/Anesthesia: Versed/ Fentanyl and 2% xylocaine local anesthesia. Estimated Blood Loss: < 50 ml. Specimens Removed: None         Disposition of Specimen: N/A        Complications: No Immediate Complications. Post-procedure Diagnosis/Findings:        Subtotal occlusion (99%) of OM2, s/p successful PCI/SAURAV    90-95% stenosis of LPL, s/p successful PCI/SAURAV    80% of LAD, s/p successful PCI/SAURAV    Acute coronary syndrome/Unstable Angina   Uncontrolled HTN    Residual RCA stenosis     Recommendations:  Bed rest for 4 hours post procedure   Monitor on Telemetry  Optimize medical therapy for Coronary Artery Disease  Aggressive risk factor modification   Access site care  Antiplatelet therapy: ASA 81 mg PO daily and Birlinta 90 mg po BID   Stop Plavix   High intensity statin therapy  IVF: NS at 125 cc/h  AM Labs: BMP, CBC   Staged PCI of RCA as outpatient   BP remains uncontrolled  On NTG gtt  Adjust PO BP medications as needed to achieve good BP control, and titrate NTG gtt off   Stop Atenolol  Start Coreg   Cont Losartan 100 mg po daily  Increase Procardia XL to 60 mg po daily   Patient should monitor BP at home   May discharge patient home tomorrow if he feels well, BP controlled    Consult cardiac Rehab      Above findings and plan of care were discussed with patient and his family, questions were answered, agreeable with plan.        Jerline Peabody, MD, Marc Rush   Electronically signed 8/3/2021 at 8:57 AM  Interventional Cardiology
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
## 2021-08-03 NOTE — PROGRESS NOTES
Patient back from cath lab. Family given patients stent card. Instructed patient to keep stent card in his wallet. Cath lab called updating patients next staged PCI is August 26 at [facility] [address]. Updated family on this scheduled date.
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
## 2021-08-03 NOTE — FLOWSHEET NOTE
SPIRITUAL CARE PROGRESS NOTE    Spiritual Assessment:  encountered patient as part of spiritual care rounds. Patient was approachable, receptive of 's presence. Patient was joined by family in room during the encounter. Patient expressed \"feeling better\" compared to when he was admitted and that he recently had a stint procedure. Patient shared having family support and to be coping with hospitalization. Intervention:  was a spiritual presence to patient and family.  also nurtured hope. Outcome: Patient expressed gratitude for the visit and was informed additional spiritual care services are available upon request.      08/03/21 1327   Encounter Summary   Services provided to: Patient; Family   Referral/Consult From: [facility] Family members   Continue Visiting Yes  (8/3/2021)   Complexity of Encounter Low   Length of Encounter 15 minutes   Routine   Type Initial   Assessment Calm; Approachable;Coping   Intervention Active listening;Explored coping resources;Sustaining presence/ Ministry of presence   Outcome Expressed gratitude   Spiritual/Orthodox   Type Spiritual support     Electronically signed by Jefferson Gowers, on 8/3/2021 at 1:31 PM.  [place]

## 2021-08-03 NOTE — PROGRESS NOTES
Inpatient Cardiac Rehabilitation Consult    Received consult for Phase II Cardiac Rehabilitation. Patient needs cardiac rehab due to PCI on 8/3/21. Importance of Cardiac Rehab discussed with patient. Reviewed cardiac rehab class times. Patient questions answered. We will contact patient at home to schedule evaluation appointment. Cardiac Rehab brochure given.

## 2021-08-03 NOTE — PROGRESS NOTES
Hospitalist Progress Note      Patient:  Louise Gaytan    Unit/Bed:3B-24/024-A  YOB: 1965  MRN: [id number]   Acct: [de-identified]   PCP: Myah Becerril MD  Date of Admission: 8/1/2021    Assessment/Plan:    1. Unstable angina:  a. Patient placement of 4 stents this morning with cardiology. Patient will need staged PCI of RCA as an outpatient in 1 to 2 months.  b. Continue aspirin, Brilinta, Lipitor, Coreg, losartan  c. Remains on nitroglycerin drip. Will titrate down as tolerated  d. IV morphine as needed for nitroglycerin induced headache and recurrent chest pain.  e. Patient will benefit from cardiac rehabilitation time of discharge. 2. GERD:  a. Continue twice daily PPI therapy and Carafate  b. May ultimately require GI referral for endoscopic evaluation, likely on outpatient basis following cardiac work-up and treatment. 3. HTN, uncontrolled  a. Remains on nitroglycerin drip.  b. Continue losartan 100 mg daily. Increase nifedipine from 3060 mg daily. Continue carvedilol. 4. Diabetes mellitus type 2:   a. Basal/bolus insulin with Accu-Cheks AC at bedtime  b. Hold home glimepiride and Metformin    Disposition: Disposition likely home with family in 25 to 48 hours following posting care. Chief Complaint: Chest pain    Hospital Course:    Patient is a very pleasant 58-year-old male with multiple coronary artery disease risk factors include diabetes, obesity, male gender, and uncontrolled hypertension. The patient has had persistent chest pain for several weeks and was recently evaluated in the ER. At that time, he was prescribed PPI twice daily, H2 blocker, and Carafate. His symptoms have persisted over the preceding several days, typically following a meal and described as a burning sensation with some radiation to his left arm. Patient is able to exercise without chest pain.   He was scheduled to have a stress test completed on Monday, 8/2, to further evaluate. Subjective (past 24 hours):   Patient evaluated following left heart catheterization with placement of 4 stents today. He states that he has some left arm pain and a headache, but denies any chest pain or shortness of breath. Patient had no chest pain overnight. He remains on nitroglycerin drip with reasonable blood pressure control. Past medical history, family history, social history and allergies reviewed again and is unchanged since admission. ROS (12 point review of systems completed. Pertinent positives noted.  Otherwise ROS is negative)     Medications:  Reviewed    Infusion Medications    sodium chloride      sodium chloride 125 mL/hr at 08/03/21 0916    heparin (PORCINE) Infusion 13.4 Units/kg/hr (08/03/21 0034)    dextrose      nitroGLYCERIN Stopped (08/03/21 1157)     Scheduled Medications    [START ON 8/4/2021] NIFEdipine  60 mg Oral Daily    carvedilol  12.5 mg Oral BID WC    sodium chloride flush  5-40 mL Intravenous 2 times per day    [START ON 8/4/2021] aspirin  81 mg Oral Daily    ticagrelor  90 mg Oral BID    pantoprazole  40 mg Oral BID AC    sucralfate  1 g Oral 4x Daily    insulin lispro  0-6 Units Subcutaneous TID WC    insulin lispro  0-3 Units Subcutaneous Nightly    atorvastatin  80 mg Oral Nightly    losartan  100 mg Oral Daily    insulin glargine  15 Units Subcutaneous Nightly     PRN Meds: nitroGLYCERIN, sodium chloride flush, sodium chloride, morphine, heparin (porcine), heparin (porcine), famotidine, glucose, dextrose, glucagon (rDNA), dextrose, ondansetron **OR** ondansetron, acetaminophen **OR** acetaminophen, polyethylene glycol, potassium chloride **OR** potassium alternative oral replacement **OR** potassium chloride, magnesium sulfate, calcium carbonate, regadenoson      Intake/Output Summary (Last 24 hours) at 8/3/2021 1225  Last data filed at 8/3/2021 0900  Gross per 24 hour   Intake 696.73 ml   Output 600 ml   Net 96.73 ml       Diet:  Diet NPO Exceptions are: Sips of Water with Meds  ADULT DIET; Regular    Exam:  BP (!) 141/67   Pulse 59   Temp 97.4 °F (36.3 °C) (Oral)   Resp 16   Ht 5' 10\" (1.778 m)   Wt 231 lb 4.8 oz (104.9 kg)   SpO2 96%   BMI 33.19 kg/m²   General appearance: No apparent distress, appears stated age and cooperative. HEENT: Pupils equal, round, and reactive to light. Conjunctivae/corneas clear. Neck: Supple, with full range of motion. No jugular venous distention. Trachea midline. Respiratory:  Normal respiratory effort. Clear to auscultation, bilaterally without Rales/Wheezes/Rhonchi. Cardiovascular: Regular rate and rhythm with normal S1/S2 without murmurs, rubs or gallops. Abdomen: Soft, non-tender, non-distended with normal bowel sounds. Musculoskeletal: passive and active ROM x 4 extremities. Skin: Multiple chronic skin lesions throughout the body from prior surgeries. No active skin lesions or rashes. No drainage or erythema. Rare catheterization site with mild tenderness. No appreciable bruit. Neurologic:  Neurovascularly intact without any focal sensory/motor deficits. Cranial nerves: II-XII intact, grossly non-focal.  Psychiatric: Alert and oriented, thought content appropriate, normal insight  Capillary Refill: Brisk,< 3 seconds   Peripheral Pulses: +2 palpable, equal bilaterally     Labs:   Recent Labs     08/01/21  0338 08/02/21  0357 08/03/21  0340   WBC 12.7* 10.2 9.7   HGB 15.0 15.4 14.8   HCT 46.5 48.3 44.5    228 213     Recent Labs     08/01/21  0735 08/02/21  0356 08/03/21  0340    137 140   K 4.1 3.8 3.7    104 107   CO2 22* 22* 21*   BUN 22 27* 22   CREATININE 0.8 0.8 0.8   CALCIUM 9.3 9.0 8.7     No results for input(s): AST, ALT, BILIDIR, BILITOT, ALKPHOS in the last 72 hours. Recent Labs     08/03/21  0528   INR 0.98     No results for input(s): Marichuy Hernandez in the last 72 hours.     Microbiology:    Blood culture #1: No results found for: [facility]    Blood culture #2:No results found for: Prasanna Johnson    Organism:No results found for: ORG    No results found for: LABGRAM    MRSA culture only:No results found for: [address]     Urine culture: No results found for: LABURIN    Respiratory culture: No results found for: CULTRESP    Aerobic and Anaerobic :  No results found for: LABAERO  No results found for: LABANAE    Urinalysis:    No results found for: NITRU, WBCUA, BACTERIA, RBCUA, BLOODU, SPECGRAV, GLUCOSEU    Radiology:  XR CHEST PORTABLE   Final Result   Impression:   1. No acute cardiopulmonary disease. This document has been electronically signed by: Melina Stanley MD on    08/02/2021 08:08 AM        No results found.     Electronically signed by Payton Sims DO on 8/3/2021 at 12:25 PM

## 2021-08-04 VITALS
TEMPERATURE: 98 F | DIASTOLIC BLOOD PRESSURE: 67 MMHG | BODY MASS INDEX: 33.11 KG/M2 | HEART RATE: 62 BPM | RESPIRATION RATE: 16 BRPM | WEIGHT: 231.3 LBS | HEIGHT: 70 IN | SYSTOLIC BLOOD PRESSURE: 140 MMHG | OXYGEN SATURATION: 95 %

## 2021-08-04 LAB
ANION GAP SERPL CALCULATED.3IONS-SCNC: 12 MEQ/L (ref 8–16)
BUN BLDV-MCNC: 25 MG/DL (ref 7–22)
CALCIUM SERPL-MCNC: 9.3 MG/DL (ref 8.5–10.5)
CHLORIDE BLD-SCNC: 105 MEQ/L (ref 98–111)
CHOLESTEROL, TOTAL: 119 MG/DL (ref 100–199)
CO2: 21 MEQ/L (ref 23–33)
CREAT SERPL-MCNC: 0.9 MG/DL (ref 0.4–1.2)
ERYTHROCYTE [DISTWIDTH] IN BLOOD BY AUTOMATED COUNT: 13.7 % (ref 11.5–14.5)
ERYTHROCYTE [DISTWIDTH] IN BLOOD BY AUTOMATED COUNT: 46 FL (ref 35–45)
GFR SERPL CREATININE-BSD FRML MDRD: 87 ML/MIN/1.73M2
GLUCOSE BLD-MCNC: 143 MG/DL (ref 70–108)
GLUCOSE BLD-MCNC: 153 MG/DL (ref 70–108)
HCT VFR BLD CALC: 46.1 % (ref 42–52)
HDLC SERPL-MCNC: 37 MG/DL
HEMOGLOBIN: 15.2 GM/DL (ref 14–18)
LDL CHOLESTEROL CALCULATED: 59 MG/DL
MCH RBC QN AUTO: 30.3 PG (ref 26–33)
MCHC RBC AUTO-ENTMCNC: 33 GM/DL (ref 32.2–35.5)
MCV RBC AUTO: 91.8 FL (ref 80–94)
PLATELET # BLD: 253 THOU/MM3 (ref 130–400)
PMV BLD AUTO: 10 FL (ref 9.4–12.4)
POTASSIUM SERPL-SCNC: 4.5 MEQ/L (ref 3.5–5.2)
RBC # BLD: 5.02 MILL/MM3 (ref 4.7–6.1)
SODIUM BLD-SCNC: 138 MEQ/L (ref 135–145)
TRIGL SERPL-MCNC: 115 MG/DL (ref 0–199)
WBC # BLD: 13.1 THOU/MM3 (ref 4.8–10.8)

## 2021-08-04 PROCEDURE — 6370000000 HC RX 637 (ALT 250 FOR IP): Performed by: INTERNAL MEDICINE

## 2021-08-04 PROCEDURE — 82948 REAGENT STRIP/BLOOD GLUCOSE: CPT

## 2021-08-04 PROCEDURE — 80061 LIPID PANEL: CPT

## 2021-08-04 PROCEDURE — 6370000000 HC RX 637 (ALT 250 FOR IP): Performed by: PHYSICIAN ASSISTANT

## 2021-08-04 PROCEDURE — 2580000003 HC RX 258: Performed by: INTERNAL MEDICINE

## 2021-08-04 PROCEDURE — 6370000000 HC RX 637 (ALT 250 FOR IP): Performed by: STUDENT IN AN ORGANIZED HEALTH CARE EDUCATION/TRAINING PROGRAM

## 2021-08-04 PROCEDURE — 85027 COMPLETE CBC AUTOMATED: CPT

## 2021-08-04 PROCEDURE — 99239 HOSP IP/OBS DSCHRG MGMT >30: CPT | Performed by: FAMILY MEDICINE

## 2021-08-04 PROCEDURE — 36415 COLL VENOUS BLD VENIPUNCTURE: CPT

## 2021-08-04 PROCEDURE — 99232 SBSQ HOSP IP/OBS MODERATE 35: CPT | Performed by: NURSE PRACTITIONER

## 2021-08-04 PROCEDURE — 80048 BASIC METABOLIC PNL TOTAL CA: CPT

## 2021-08-04 RX ORDER — NITROGLYCERIN 0.4 MG/1
TABLET SUBLINGUAL
Qty: 25 TABLET | Refills: 1 | Status: SHIPPED | OUTPATIENT
Start: 2021-08-04

## 2021-08-04 RX ORDER — LOSARTAN POTASSIUM 100 MG/1
100 TABLET ORAL DAILY
Qty: 30 TABLET | Refills: 3 | Status: ON HOLD | OUTPATIENT
Start: 2021-08-05 | End: 2021-08-10 | Stop reason: HOSPADM

## 2021-08-04 RX ORDER — NIFEDIPINE 60 MG/1
60 TABLET, FILM COATED, EXTENDED RELEASE ORAL DAILY
Qty: 30 TABLET | Refills: 3 | Status: ON HOLD | OUTPATIENT
Start: 2021-08-05 | End: 2021-08-10 | Stop reason: HOSPADM

## 2021-08-04 RX ORDER — ASPIRIN 81 MG/1
81 TABLET ORAL DAILY
Qty: 30 TABLET | Refills: 3 | Status: SHIPPED | OUTPATIENT
Start: 2021-08-05

## 2021-08-04 RX ORDER — ATORVASTATIN CALCIUM 80 MG/1
80 TABLET, FILM COATED ORAL NIGHTLY
Qty: 30 TABLET | Refills: 3 | Status: SHIPPED | OUTPATIENT
Start: 2021-08-04 | End: 2022-04-08

## 2021-08-04 RX ORDER — CARVEDILOL 12.5 MG/1
12.5 TABLET ORAL 2 TIMES DAILY WITH MEALS
Qty: 60 TABLET | Refills: 3 | Status: ON HOLD | OUTPATIENT
Start: 2021-08-04 | End: 2021-08-10 | Stop reason: HOSPADM

## 2021-08-04 RX ADMIN — NIFEDIPINE 60 MG: 60 TABLET, EXTENDED RELEASE ORAL at 10:04

## 2021-08-04 RX ADMIN — LOSARTAN POTASSIUM 100 MG: 100 TABLET, FILM COATED ORAL at 10:04

## 2021-08-04 RX ADMIN — SODIUM CHLORIDE, PRESERVATIVE FREE 10 ML: 5 INJECTION INTRAVENOUS at 10:10

## 2021-08-04 RX ADMIN — SUCRALFATE 1 G: 1 TABLET ORAL at 10:04

## 2021-08-04 RX ADMIN — ASPIRIN 81 MG: 81 TABLET, COATED ORAL at 10:05

## 2021-08-04 RX ADMIN — FAMOTIDINE 20 MG: 20 TABLET, FILM COATED ORAL at 04:28

## 2021-08-04 RX ADMIN — PANTOPRAZOLE SODIUM 40 MG: 40 TABLET, DELAYED RELEASE ORAL at 04:25

## 2021-08-04 RX ADMIN — CARVEDILOL 12.5 MG: 6.25 TABLET, FILM COATED ORAL at 10:04

## 2021-08-04 RX ADMIN — TICAGRELOR 90 MG: 90 TABLET ORAL at 10:05

## 2021-08-04 ASSESSMENT — PAIN - FUNCTIONAL ASSESSMENT: PAIN_FUNCTIONAL_ASSESSMENT: ACTIVITIES ARE NOT PREVENTED

## 2021-08-04 ASSESSMENT — PAIN DESCRIPTION - ONSET: ONSET: ON-GOING

## 2021-08-04 ASSESSMENT — PAIN DESCRIPTION - FREQUENCY: FREQUENCY: INTERMITTENT

## 2021-08-04 ASSESSMENT — PAIN DESCRIPTION - LOCATION: LOCATION: CHEST

## 2021-08-04 ASSESSMENT — PAIN DESCRIPTION - PAIN TYPE: TYPE: ACUTE PAIN

## 2021-08-04 ASSESSMENT — PAIN SCALES - GENERAL
PAINLEVEL_OUTOF10: 0
PAINLEVEL_OUTOF10: 2
PAINLEVEL_OUTOF10: 0
PAINLEVEL_OUTOF10: 0

## 2021-08-04 ASSESSMENT — PAIN DESCRIPTION - PROGRESSION: CLINICAL_PROGRESSION: GRADUALLY IMPROVING

## 2021-08-04 ASSESSMENT — PAIN DESCRIPTION - ORIENTATION: ORIENTATION: MID
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
## 2021-08-04 NOTE — CARE COORDINATION
8/4/21, 1:37 PM EDT    Patient goals/plan/ treatment preferences discussed by  and . Patient goals/plan/ treatment preferences reviewed with patient/ family. Patient/ family verbalize understanding of discharge plan and are in agreement with goal/plan/treatment preferences. Understanding was demonstrated using the teach back method. AVS provided by RN at time of discharge, which includes all necessary medical information pertaining to the patients current course of illness, treatment, post-discharge goals of care, and treatment preferences. IMM Letter  IMM Letter given to Patient/Family/Significant other/Guardian/POA/by[de-identified]   IMM Letter date given[de-identified] 08/04/21  IMM Letter time given[de-identified] 0694       Pt discharged home today. Spoke with pt this am, denied any discharge needs. PCP appt made by RN.      Electronically signed by Ana M Fierro RN on 8/4/2021 at 1:38 PM

## 2021-08-04 NOTE — DISCHARGE INSTR - DIET

## 2021-08-05 ENCOUNTER — APPOINTMENT (OUTPATIENT)
Dept: CT IMAGING | Age: 56
DRG: 312 | End: 2021-08-05
Payer: MEDICARE

## 2021-08-05 ENCOUNTER — APPOINTMENT (OUTPATIENT)
Dept: GENERAL RADIOLOGY | Age: 56
DRG: 312 | End: 2021-08-05
Payer: MEDICARE

## 2021-08-05 ENCOUNTER — HOSPITAL ENCOUNTER (INPATIENT)
Age: 56
LOS: 4 days | Discharge: HOME | DRG: 312 | End: 2021-08-10
Attending: EMERGENCY MEDICINE | Admitting: FAMILY MEDICINE
Payer: MEDICARE

## 2021-08-05 DIAGNOSIS — N17.9 AKI (ACUTE KIDNEY INJURY) (HCC): Primary | ICD-10-CM

## 2021-08-05 DIAGNOSIS — I95.1 ORTHOSTATIC HYPOTENSION: ICD-10-CM

## 2021-08-05 DIAGNOSIS — R55 NEAR SYNCOPE: ICD-10-CM

## 2021-08-05 LAB
ANION GAP SERPL CALCULATED.3IONS-SCNC: 12 MEQ/L (ref 8–16)
BASOPHILS # BLD: 0.5 %
BASOPHILS ABSOLUTE: 0.1 THOU/MM3 (ref 0–0.1)
BUN BLDV-MCNC: 42 MG/DL (ref 7–22)
CALCIUM SERPL-MCNC: 8.3 MG/DL (ref 8.5–10.5)
CHLORIDE BLD-SCNC: 108 MEQ/L (ref 98–111)
CO2: 17 MEQ/L (ref 23–33)
CREAT SERPL-MCNC: 1.4 MG/DL (ref 0.4–1.2)
EOSINOPHIL # BLD: 3.8 %
EOSINOPHILS ABSOLUTE: 0.6 THOU/MM3 (ref 0–0.4)
ERYTHROCYTE [DISTWIDTH] IN BLOOD BY AUTOMATED COUNT: 13.5 % (ref 11.5–14.5)
ERYTHROCYTE [DISTWIDTH] IN BLOOD BY AUTOMATED COUNT: 43.1 FL (ref 35–45)
ETHYL ALCOHOL, SERUM: < 0.01 %
GFR SERPL CREATININE-BSD FRML MDRD: 52 ML/MIN/1.73M2
GLUCOSE BLD-MCNC: 174 MG/DL (ref 70–108)
GLUCOSE BLD-MCNC: 188 MG/DL
GLUCOSE BLD-MCNC: 188 MG/DL (ref 70–108)
HCT VFR BLD CALC: 41.3 % (ref 42–52)
HEMOGLOBIN: 14.4 GM/DL (ref 14–18)
IMMATURE GRANS (ABS): 0.15 THOU/MM3 (ref 0–0.07)
IMMATURE GRANULOCYTES: 1 %
LYMPHOCYTES # BLD: 7.7 %
LYMPHOCYTES ABSOLUTE: 1.1 THOU/MM3 (ref 1–4.8)
MCH RBC QN AUTO: 30.6 PG (ref 26–33)
MCHC RBC AUTO-ENTMCNC: 34.9 GM/DL (ref 32.2–35.5)
MCV RBC AUTO: 87.9 FL (ref 80–94)
MONOCYTES # BLD: 6.2 %
MONOCYTES ABSOLUTE: 0.9 THOU/MM3 (ref 0.4–1.3)
NUCLEATED RED BLOOD CELLS: 0 /100 WBC
OSMOLALITY CALCULATION: 288.5 MOSMOL/KG (ref 275–300)
PLATELET # BLD: 194 THOU/MM3 (ref 130–400)
PMV BLD AUTO: 10.1 FL (ref 9.4–12.4)
POTASSIUM REFLEX MAGNESIUM: 4.2 MEQ/L (ref 3.5–5.2)
RBC # BLD: 4.7 MILL/MM3 (ref 4.7–6.1)
SCAN OF BLOOD SMEAR: NORMAL
SEG NEUTROPHILS: 80.8 %
SEGMENTED NEUTROPHILS ABSOLUTE COUNT: 11.8 THOU/MM3 (ref 1.8–7.7)
SODIUM BLD-SCNC: 137 MEQ/L (ref 135–145)
TROPONIN T: 0.07 NG/ML
TROPONIN T: 0.07 NG/ML
TROPONIN T: 0.09 NG/ML
TSH SERPL DL<=0.05 MIU/L-ACNC: 1.55 UIU/ML (ref 0.4–4.2)
WBC # BLD: 14.6 THOU/MM3 (ref 4.8–10.8)

## 2021-08-05 PROCEDURE — 96361 HYDRATE IV INFUSION ADD-ON: CPT

## 2021-08-05 PROCEDURE — G0378 HOSPITAL OBSERVATION PER HR: HCPCS

## 2021-08-05 PROCEDURE — 70450 CT HEAD/BRAIN W/O DYE: CPT

## 2021-08-05 PROCEDURE — 82948 REAGENT STRIP/BLOOD GLUCOSE: CPT

## 2021-08-05 PROCEDURE — 2580000003 HC RX 258: Performed by: STUDENT IN AN ORGANIZED HEALTH CARE EDUCATION/TRAINING PROGRAM

## 2021-08-05 PROCEDURE — 85025 COMPLETE CBC W/AUTO DIFF WBC: CPT

## 2021-08-05 PROCEDURE — 36415 COLL VENOUS BLD VENIPUNCTURE: CPT

## 2021-08-05 PROCEDURE — 99285 EMERGENCY DEPT VISIT HI MDM: CPT

## 2021-08-05 PROCEDURE — 71045 X-RAY EXAM CHEST 1 VIEW: CPT

## 2021-08-05 PROCEDURE — 82077 ASSAY SPEC XCP UR&BREATH IA: CPT

## 2021-08-05 PROCEDURE — 84484 ASSAY OF TROPONIN QUANT: CPT

## 2021-08-05 PROCEDURE — 99225 PR SBSQ OBSERVATION CARE/DAY 25 MINUTES: CPT | Performed by: FAMILY MEDICINE

## 2021-08-05 PROCEDURE — 6370000000 HC RX 637 (ALT 250 FOR IP): Performed by: FAMILY MEDICINE

## 2021-08-05 PROCEDURE — 84443 ASSAY THYROID STIM HORMONE: CPT

## 2021-08-05 PROCEDURE — 2580000003 HC RX 258: Performed by: FAMILY MEDICINE

## 2021-08-05 PROCEDURE — 80048 BASIC METABOLIC PNL TOTAL CA: CPT

## 2021-08-05 PROCEDURE — 96360 HYDRATION IV INFUSION INIT: CPT

## 2021-08-05 PROCEDURE — 96372 THER/PROPH/DIAG INJ SC/IM: CPT

## 2021-08-05 PROCEDURE — 6360000002 HC RX W HCPCS: Performed by: FAMILY MEDICINE

## 2021-08-05 PROCEDURE — 93005 ELECTROCARDIOGRAM TRACING: CPT | Performed by: STUDENT IN AN ORGANIZED HEALTH CARE EDUCATION/TRAINING PROGRAM

## 2021-08-05 RX ORDER — SODIUM CHLORIDE 0.9 % (FLUSH) 0.9 %
5-40 SYRINGE (ML) INJECTION EVERY 12 HOURS SCHEDULED
Status: DISCONTINUED | OUTPATIENT
Start: 2021-08-05 | End: 2021-08-10 | Stop reason: HOSPADM

## 2021-08-05 RX ORDER — POLYETHYLENE GLYCOL 3350 17 G/17G
17 POWDER, FOR SOLUTION ORAL DAILY PRN
Status: DISCONTINUED | OUTPATIENT
Start: 2021-08-05 | End: 2021-08-10 | Stop reason: HOSPADM

## 2021-08-05 RX ORDER — ONDANSETRON 2 MG/ML
4 INJECTION INTRAMUSCULAR; INTRAVENOUS EVERY 6 HOURS PRN
Status: DISCONTINUED | OUTPATIENT
Start: 2021-08-05 | End: 2021-08-10 | Stop reason: HOSPADM

## 2021-08-05 RX ORDER — ONDANSETRON 4 MG/1
4 TABLET, ORALLY DISINTEGRATING ORAL EVERY 8 HOURS PRN
Status: DISCONTINUED | OUTPATIENT
Start: 2021-08-05 | End: 2021-08-10 | Stop reason: HOSPADM

## 2021-08-05 RX ORDER — NIFEDIPINE 60 MG/1
60 TABLET, EXTENDED RELEASE ORAL DAILY
Status: DISCONTINUED | OUTPATIENT
Start: 2021-08-05 | End: 2021-08-05

## 2021-08-05 RX ORDER — ATORVASTATIN CALCIUM 80 MG/1
80 TABLET, FILM COATED ORAL NIGHTLY
Status: DISCONTINUED | OUTPATIENT
Start: 2021-08-06 | End: 2021-08-10 | Stop reason: HOSPADM

## 2021-08-05 RX ORDER — ACETAMINOPHEN 650 MG/1
650 SUPPOSITORY RECTAL EVERY 6 HOURS PRN
Status: DISCONTINUED | OUTPATIENT
Start: 2021-08-05 | End: 2021-08-10 | Stop reason: HOSPADM

## 2021-08-05 RX ORDER — CARVEDILOL 6.25 MG/1
12.5 TABLET ORAL 2 TIMES DAILY WITH MEALS
Status: DISCONTINUED | OUTPATIENT
Start: 2021-08-05 | End: 2021-08-06

## 2021-08-05 RX ORDER — FAMOTIDINE 20 MG/1
20 TABLET, FILM COATED ORAL 2 TIMES DAILY PRN
Status: DISCONTINUED | OUTPATIENT
Start: 2021-08-05 | End: 2021-08-10 | Stop reason: HOSPADM

## 2021-08-05 RX ORDER — SODIUM CHLORIDE 9 MG/ML
INJECTION, SOLUTION INTRAVENOUS CONTINUOUS
Status: DISCONTINUED | OUTPATIENT
Start: 2021-08-05 | End: 2021-08-08

## 2021-08-05 RX ORDER — GLIPIZIDE 5 MG/1
5 TABLET ORAL
Status: DISCONTINUED | OUTPATIENT
Start: 2021-08-06 | End: 2021-08-10 | Stop reason: HOSPADM

## 2021-08-05 RX ORDER — 0.9 % SODIUM CHLORIDE 0.9 %
500 INTRAVENOUS SOLUTION INTRAVENOUS ONCE
Status: COMPLETED | OUTPATIENT
Start: 2021-08-05 | End: 2021-08-05

## 2021-08-05 RX ORDER — ACETAMINOPHEN 325 MG/1
650 TABLET ORAL EVERY 6 HOURS PRN
Status: DISCONTINUED | OUTPATIENT
Start: 2021-08-05 | End: 2021-08-10 | Stop reason: HOSPADM

## 2021-08-05 RX ORDER — SUCRALFATE 1 G/1
1 TABLET ORAL
Status: DISCONTINUED | OUTPATIENT
Start: 2021-08-05 | End: 2021-08-10 | Stop reason: HOSPADM

## 2021-08-05 RX ORDER — ASPIRIN 81 MG/1
81 TABLET ORAL DAILY
Status: DISCONTINUED | OUTPATIENT
Start: 2021-08-06 | End: 2021-08-10 | Stop reason: HOSPADM

## 2021-08-05 RX ORDER — SODIUM CHLORIDE 9 MG/ML
25 INJECTION, SOLUTION INTRAVENOUS PRN
Status: DISCONTINUED | OUTPATIENT
Start: 2021-08-05 | End: 2021-08-10 | Stop reason: HOSPADM

## 2021-08-05 RX ORDER — SODIUM CHLORIDE 0.9 % (FLUSH) 0.9 %
5-40 SYRINGE (ML) INJECTION PRN
Status: DISCONTINUED | OUTPATIENT
Start: 2021-08-05 | End: 2021-08-10 | Stop reason: HOSPADM

## 2021-08-05 RX ORDER — NIFEDIPINE 60 MG/1
60 TABLET, EXTENDED RELEASE ORAL DAILY
Status: DISCONTINUED | OUTPATIENT
Start: 2021-08-05 | End: 2021-08-06

## 2021-08-05 RX ADMIN — SODIUM CHLORIDE, PRESERVATIVE FREE 10 ML: 5 INJECTION INTRAVENOUS at 20:21

## 2021-08-05 RX ADMIN — ENOXAPARIN SODIUM 40 MG: 40 INJECTION SUBCUTANEOUS at 20:21

## 2021-08-05 RX ADMIN — SODIUM CHLORIDE: 9 INJECTION, SOLUTION INTRAVENOUS at 20:28

## 2021-08-05 RX ADMIN — SODIUM CHLORIDE 500 ML: 9 INJECTION, SOLUTION INTRAVENOUS at 15:56

## 2021-08-05 RX ADMIN — TICAGRELOR 90 MG: 90 TABLET ORAL at 20:21

## 2021-08-05 RX ADMIN — CARVEDILOL 12.5 MG: 6.25 TABLET, FILM COATED ORAL at 20:21

## 2021-08-05 RX ADMIN — SODIUM CHLORIDE 500 ML: 9 INJECTION, SOLUTION INTRAVENOUS at 14:32

## 2021-08-05 RX ADMIN — SUCRALFATE 1 G: 1 TABLET ORAL at 20:21

## 2021-08-05 ASSESSMENT — PAIN DESCRIPTION - DESCRIPTORS: DESCRIPTORS: ACHING

## 2021-08-05 ASSESSMENT — PAIN DESCRIPTION - PROGRESSION
CLINICAL_PROGRESSION: GRADUALLY IMPROVING
CLINICAL_PROGRESSION: GRADUALLY IMPROVING

## 2021-08-05 ASSESSMENT — PAIN DESCRIPTION - LOCATION: LOCATION: CHEST

## 2021-08-05 ASSESSMENT — ENCOUNTER SYMPTOMS
BACK PAIN: 0
VOMITING: 0
SHORTNESS OF BREATH: 0
SINUS PAIN: 0
EYE REDNESS: 0
RHINORRHEA: 0
NAUSEA: 0
ABDOMINAL PAIN: 0
COUGH: 0
SORE THROAT: 0
DIARRHEA: 0

## 2021-08-05 ASSESSMENT — PAIN SCALES - GENERAL
PAINLEVEL_OUTOF10: 2
PAINLEVEL_OUTOF10: 1

## 2021-08-05 ASSESSMENT — PAIN DESCRIPTION - PAIN TYPE: TYPE: ACUTE PAIN

## 2021-08-05 ASSESSMENT — PAIN DESCRIPTION - FREQUENCY: FREQUENCY: INTERMITTENT
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
## 2021-08-05 NOTE — ED NOTES
Patient resting in bed. Respirations easy and unlabored. No distress noted. Call light within reach.        Caroline Perales RN  08/05/21 1240

## 2021-08-05 NOTE — PROGRESS NOTES
CLINICAL PHARMACY NOTE: MEDS TO BEDS    Total # of Prescriptions Filled: 7   The following medications were delivered to the patient:  Losartan 100mg  Brilinta 90mg  ASA 81mg  Nifedipine ER 60mg  NTG 0.4mg SL  Carvedilol 12.5mg  Atorvastatin 80mg    Additional Documentation:

## 2021-08-05 NOTE — ED TRIAGE NOTES
Pt states I had 4 heart stents on Friday. I was at home & felt like I was gong to pass out.   EKG obtained, connected to monitor, denies chest pain or sob

## 2021-08-06 PROBLEM — I95.1 ORTHOSTATIC HYPOTENSION: Status: ACTIVE | Noted: 2021-08-06

## 2021-08-06 LAB
ANION GAP SERPL CALCULATED.3IONS-SCNC: 11 MEQ/L (ref 8–16)
BUN BLDV-MCNC: 34 MG/DL (ref 7–22)
CALCIUM SERPL-MCNC: 8.2 MG/DL (ref 8.5–10.5)
CHLORIDE BLD-SCNC: 109 MEQ/L (ref 98–111)
CO2: 19 MEQ/L (ref 23–33)
CREAT SERPL-MCNC: 1 MG/DL (ref 0.4–1.2)
EKG ATRIAL RATE: 63 BPM
EKG P AXIS: 34 DEGREES
EKG P-R INTERVAL: 174 MS
EKG Q-T INTERVAL: 364 MS
EKG QRS DURATION: 92 MS
EKG QTC CALCULATION (BAZETT): 372 MS
EKG R AXIS: 66 DEGREES
EKG T AXIS: 35 DEGREES
EKG VENTRICULAR RATE: 63 BPM
ERYTHROCYTE [DISTWIDTH] IN BLOOD BY AUTOMATED COUNT: 13.6 % (ref 11.5–14.5)
ERYTHROCYTE [DISTWIDTH] IN BLOOD BY AUTOMATED COUNT: 45.1 FL (ref 35–45)
GFR SERPL CREATININE-BSD FRML MDRD: 77 ML/MIN/1.73M2
GLUCOSE BLD-MCNC: 141 MG/DL (ref 70–108)
HCT VFR BLD CALC: 39.3 % (ref 42–52)
HEMOGLOBIN: 13.1 GM/DL (ref 14–18)
MAGNESIUM: 1.8 MG/DL (ref 1.6–2.4)
MCH RBC QN AUTO: 30.5 PG (ref 26–33)
MCHC RBC AUTO-ENTMCNC: 33.3 GM/DL (ref 32.2–35.5)
MCV RBC AUTO: 91.6 FL (ref 80–94)
PLATELET # BLD: 217 THOU/MM3 (ref 130–400)
PMV BLD AUTO: 9.7 FL (ref 9.4–12.4)
POTASSIUM REFLEX MAGNESIUM: 3.5 MEQ/L (ref 3.5–5.2)
RBC # BLD: 4.29 MILL/MM3 (ref 4.7–6.1)
SODIUM BLD-SCNC: 139 MEQ/L (ref 135–145)
TROPONIN T: 0.09 NG/ML
WBC # BLD: 13.1 THOU/MM3 (ref 4.8–10.8)

## 2021-08-06 PROCEDURE — 96361 HYDRATE IV INFUSION ADD-ON: CPT

## 2021-08-06 PROCEDURE — 2140000000 HC CCU INTERMEDIATE R&B

## 2021-08-06 PROCEDURE — 84484 ASSAY OF TROPONIN QUANT: CPT

## 2021-08-06 PROCEDURE — 2580000003 HC RX 258: Performed by: FAMILY MEDICINE

## 2021-08-06 PROCEDURE — 6360000002 HC RX W HCPCS: Performed by: FAMILY MEDICINE

## 2021-08-06 PROCEDURE — 96372 THER/PROPH/DIAG INJ SC/IM: CPT

## 2021-08-06 PROCEDURE — 83735 ASSAY OF MAGNESIUM: CPT

## 2021-08-06 PROCEDURE — 36415 COLL VENOUS BLD VENIPUNCTURE: CPT

## 2021-08-06 PROCEDURE — 85027 COMPLETE CBC AUTOMATED: CPT

## 2021-08-06 PROCEDURE — 99223 1ST HOSP IP/OBS HIGH 75: CPT | Performed by: INTERNAL MEDICINE

## 2021-08-06 PROCEDURE — 99232 SBSQ HOSP IP/OBS MODERATE 35: CPT | Performed by: FAMILY MEDICINE

## 2021-08-06 PROCEDURE — 6370000000 HC RX 637 (ALT 250 FOR IP): Performed by: FAMILY MEDICINE

## 2021-08-06 PROCEDURE — G0378 HOSPITAL OBSERVATION PER HR: HCPCS

## 2021-08-06 PROCEDURE — 80048 BASIC METABOLIC PNL TOTAL CA: CPT

## 2021-08-06 RX ORDER — HYDROXYZINE HYDROCHLORIDE 25 MG/1
25 TABLET, FILM COATED ORAL 3 TIMES DAILY PRN
Status: DISCONTINUED | OUTPATIENT
Start: 2021-08-06 | End: 2021-08-08

## 2021-08-06 RX ORDER — CARVEDILOL 6.25 MG/1
6.25 TABLET ORAL 2 TIMES DAILY WITH MEALS
Status: DISCONTINUED | OUTPATIENT
Start: 2021-08-06 | End: 2021-08-08

## 2021-08-06 RX ORDER — SODIUM CHLORIDE 9 MG/ML
INJECTION, SOLUTION INTRAVENOUS CONTINUOUS
Status: DISCONTINUED | OUTPATIENT
Start: 2021-08-06 | End: 2021-08-08

## 2021-08-06 RX ORDER — 0.9 % SODIUM CHLORIDE 0.9 %
1000 INTRAVENOUS SOLUTION INTRAVENOUS ONCE
Status: DISCONTINUED | OUTPATIENT
Start: 2021-08-06 | End: 2021-08-10 | Stop reason: HOSPADM

## 2021-08-06 RX ADMIN — HYDROXYZINE HYDROCHLORIDE 25 MG: 25 TABLET ORAL at 21:20

## 2021-08-06 RX ADMIN — ENOXAPARIN SODIUM 40 MG: 40 INJECTION SUBCUTANEOUS at 21:20

## 2021-08-06 RX ADMIN — ATORVASTATIN CALCIUM 80 MG: 80 TABLET, FILM COATED ORAL at 21:21

## 2021-08-06 RX ADMIN — SUCRALFATE 1 G: 1 TABLET ORAL at 06:03

## 2021-08-06 RX ADMIN — TICAGRELOR 90 MG: 90 TABLET ORAL at 21:21

## 2021-08-06 RX ADMIN — TICAGRELOR 90 MG: 90 TABLET ORAL at 08:00

## 2021-08-06 RX ADMIN — SUCRALFATE 1 G: 1 TABLET ORAL at 11:00

## 2021-08-06 RX ADMIN — SUCRALFATE 1 G: 1 TABLET ORAL at 18:40

## 2021-08-06 RX ADMIN — GLIPIZIDE 5 MG: 5 TABLET ORAL at 08:00

## 2021-08-06 RX ADMIN — SODIUM CHLORIDE: 9 INJECTION, SOLUTION INTRAVENOUS at 06:02

## 2021-08-06 RX ADMIN — Medication 1000 ML: at 12:14

## 2021-08-06 RX ADMIN — ASPIRIN 81 MG: 81 TABLET, COATED ORAL at 10:58

## 2021-08-06 RX ADMIN — HYDROXYZINE HYDROCHLORIDE 25 MG: 25 TABLET ORAL at 15:41

## 2021-08-06 RX ADMIN — SUCRALFATE 1 G: 1 TABLET ORAL at 21:22

## 2021-08-06 ASSESSMENT — PAIN SCALES - WONG BAKER
WONGBAKER_NUMERICALRESPONSE: 0

## 2021-08-06 ASSESSMENT — PAIN SCALES - GENERAL
PAINLEVEL_OUTOF10: 0

## 2021-08-06 NOTE — CARE COORDINATION
08/06/21 1145   Readmission Assessment   Number of Days since last admission? 1-7 days   Previous Disposition Home with Family   Who is being Interviewed Patient;Caregiver   What was the patient's/caregiver's perception as to why they think they needed to return back to the hospital? Other (Comment)  (Passed out at home)   Did you visit your Primary Care Physician after you left the hospital, before you returned this time? No   Why weren't you able to visit your PCP? Other (Comment)  (Readmitted before appt (scheduled for 8/11/21))   Did you see a specialist, such as Cardiac, Pulmonary, Orthopedic Physician, etc. after you left the hospital? No   Who advised the patient to return to the hospital? Self-referral;Caregiver   Does the patient report anything that got in the way of taking their medications? No   In our efforts to provide the best possible care to you and others like you, can you think of anything that we could have done to help you after you left the hospital the first time, so that you might not have needed to return so soon?  Other (Comment)  (Nothing, unsure why passed out, possibly medication related.)

## 2021-08-06 NOTE — CONSULTS
[facility]    Patient's Name/Date of Birth: Erika Chavez / 1965 (78 y.o.)    Date: August 6, 2021     Referring Provider: Lacie Oppenheim, MD    CHIEF COMPLAINT: Dizziness    HPI: This is a pleasant 60 y.o. male with PMHx HTN and DM presents for orthostatic hypotension after PCI of LAD, left posterolateral branch and second obtuse marginal branch on 8/3/21. Pt states he went home and was unable to walk properly from the beginning. His wife noticed that he was wobbling and not able to walk straight. Then yesterday patient became diaphoretic, pale, dizzy and lost consciousness and passed out twice before EMS was called. He also describes visual changes, blurryness and seeing bright white lights right before fainting. Pt denies nausea or vomiting before losing consciousness. He states this is all new and started after the cath procedure. Pt reports before the cath procedure he had uncontrolled HTN for which his medications were changed around. Per chart review, pt's atenolol was stopped and coreg was started; procardia dosage was increased. ECHO (8/21) 55-60% with trace MR, EKG NSR, left 2nd, 3rd and 4th toes previously amputated.      Echo: Results for orders placed during the hospital encounter of 08/01/21    ECHO Complete 2D W Doppler W Color    Narrative  Transthoracic Echocardiography Report (TTE)    Demographics    Patient Name    Jennifer Prater Gender                Male    MR #            [id number]    Race                      Ethnicity    Account #       [de-identified]    Room Number           0259    Accession       [id number]   Date of Study         08/02/2021  Number    Date of Birth   1965   Referring Physician   Norman Gonzales MD    Age             60 year(s)   Grey García MD  Physician    Procedure    Type of Study    TTE procedure:ECHOCARDIOGRAM COMPLETE 2D W DOPPLER W COLOR. Procedure Date  Date: 08/02/2021 Start: 08:48 AM    Study Location: Bedside  Technical Quality: Adequate visualization    Indications:Chest pain. Additional Medical History:Hypertension, Hyperlipidemia, Diabetes, Chest  pain, IVC filter, Pneumonia, Arthritis, Family history of heart disease,  Former smoker, Stroke    Patient Status: Routine    Height: 70 inches Weight: 235.01 pounds BSA: 2.24 m^2 BMI: 33.72 kg/m^2    BP: 119/60 mmHg    Allergies  - Other allergy:(Lasix, Bactrim). Conclusions    Summary  Ejection fraction was estimated at 55-60%. IVC size is within normal limits with normal respiratory phasic changes. Signature    ----------------------------------------------------------------  Electronically signed by Evert Rodriguez MD (Interpreting  physician) on 08/02/2021 at 04:30 PM  ----------------------------------------------------------------    Findings    Mitral Valve  The mitral valve structure was normal with normal leaflet separation. DOPPLER: The transmitral velocity was within the normal range with no  evidence for mitral stenosis. There was trace mitral regurgitation. Aortic Valve  The aortic valve was trileaflet with normal thickness and cuspal  separation. DOPPLER: Transaortic velocity was within the normal range with  no evidence of aortic stenosis. There was no evidence of aortic  regurgitation. Tricuspid Valve  The tricuspid valve structure was normal with normal leaflet separation. DOPPLER: There was no evidence of tricuspid stenosis. There was no  evidence of tricuspid regurgitation. Pulmonic Valve  The pulmonic valve leaflets were not well seen. DOPPLER: The transpulmonic  velocity was within the normal range with no evidence for regurgitation. Left Atrium  Left atrial size was normal.    Left Ventricle  Normal left ventricular size and systolic function. There were no regional wall motion abnormalities.   Wall thickness was within normal limits. Ejection fraction was estimated at 55-60%. Right Atrium  Right atrial size was normal.    Right Ventricle  The right ventricular size was normal with normal systolic function and  wall thickness. Pericardial Effusion  The pericardium was normal in appearance with no evidence of a pericardial  effusion. Pleural Effusion  No evidence of pleural effusion. Aorta / Great Vessels  IVC size is within normal limits with normal respiratory phasic changes.     M-Mode/2D Measurements & Calculations    LV Diastolic   LV Systolic Dimension:    AV Cusp Separation: 2.1 cmLA  Dimension: 5.5 3.7 cm                    Dimension: 3.2 cmAO Root  cm             LV Volume Diastolic: 799  Dimension: 3.1 cmLA Area: 16.1  LV FS:32.7 %   ml                        cm^2  LV PW          LV Volume Systolic: 19.8  Diastolic: 1   ml  cm             LV EDV/LV EDV Index: 147  Septum         ml/66 m^2LV ESV/LV ESV    RV Diastolic Dimension: 2.6 cm  Diastolic: 1   Index: 58.9 ml/26 m^2  cm             EF Calculated: 60.5 %     LA/Aorta: 1.03    LA volume/Index: 41.6 ml /19m^2    Doppler Measurements & Calculations    MV Peak E-Wave: 77.1 cm/s  AV Peak Velocity: 173   LVOT Peak Velocity: 110  MV Peak A-Wave: 77.1 cm/s  cm/s                    cm/s  MV E/A Ratio: 1            AV Peak Gradient: 11.97 LVOT Peak Gradient: 5  MV Peak Gradient: 2.38     mmHg                    mmHg  mmHg  TV Peak E-Wave: 45.4  MV Deceleration Time: 335                          cm/s  msec                                               TV Peak A-Wave: 42.8  MV P1/2t: 98 msec          IVRT: 92 msec           cm/s  MVA by PHT:2.24 cm^2  TV Peak Gradient: 0.82  MV E' Septal Velocity: 6.6 AV DVI (Vmax):0.64      mmHg  cm/s  MV A' Septal Velocity: 8.1                         PV Peak Velocity: 83.6  cm/s                                               cm/s  MV E' Lateral Velocity:                            PV Peak Gradient: 2.8  7.9 cm/s mmHg  MV A' Lateral Velocity:  7.7 cm/s  E/E' septal: 11.68  E/E' lateral: 9.76    [id number]       All labs, EKG's, diagnostic testing and images as well as cardiac cath, stress testing were reviewed during this encounter    Past Medical History:   Diagnosis Date    Arthritis     Diabetes mellitus ([facility] [address]     Hypertension     Pneumonia     Unspecified cerebral artery occlusion with cerebral infarction      Past Surgical History:   Procedure Laterality Date    ABDOMEN SURGERY      FEMUR FRACTURE SURGERY Left     FRACTURE SURGERY      SKIN GRAFT      TIBIA FRACTURE SURGERY Right     TOE AMPUTATION Left     2,3,4,5 TOES    VENA CAVA FILTER PLACEMENT       Current Facility-Administered Medications   Medication Dose Route Frequency Provider Last Rate Last Admin    carvedilol (COREG) tablet 6.25 mg  6.25 mg Oral BID WC Robbie Perez MD        sodium chloride flush 0.9 % injection 5-40 mL  5-40 mL Intravenous 2 times per day Robbie Perez MD   10 mL at 08/05/21 2021    sodium chloride flush 0.9 % injection 5-40 mL  5-40 mL Intravenous PRN Robbie Perez MD        0.9 % sodium chloride infusion  25 mL Intravenous PRN Robbie Perez MD        enoxaparin (LOVENOX) injection 40 mg  40 mg Subcutaneous Q24H Robbie Perez MD   40 mg at 08/05/21 2021    ondansetron (ZOFRAN-ODT) disintegrating tablet 4 mg  4 mg Oral Q8H PRN Robbie Perez MD        Or    ondansetron [facility]) injection 4 mg  4 mg Intravenous Q6H PRN Robbie Perez MD        polyethylene glycol (GLYCOLAX) packet 17 g  17 g Oral Daily PRN Robbie Perez MD        acetaminophen (TYLENOL) tablet 650 mg  650 mg Oral Q6H PRN Robbie Perez MD        Or    acetaminophen (TYLENOL) suppository 650 mg  650 mg Rectal Q6H PRN Robbie Perez MD        0.9 % sodium chloride infusion   Intravenous Continuous Musa Ramirez  mL/hr at 08/06/21 0602 New Bag at 08/06/21 0602    aspirin EC tablet 81 mg  81 mg Oral Daily Musa Ramirez MD        atorvastatin (LIPITOR) tablet 80 mg  80 mg Oral Nightly Musa Ramirez MD        famotidine (PEPCID) tablet 20 mg  20 mg Oral BID PRN Musa Ramirez MD        glipiZIDE (GLUCOTROL) tablet 5 mg  5 mg Oral QAM AC Musa Ramirez MD        sucralfate (CARAFATE) tablet 1 g  1 g Oral 4x Daily AC & HS Musa Ramirez MD   1 g at 08/06/21 6279    ticagrelor (BRILINTA) tablet 90 mg  90 mg Oral BID Musa Ramirez MD   90 mg at 08/05/21 2021    [Held by provider] NIFEdipine (PROCARDIA XL) extended release tablet 60 mg  60 mg Oral Daily Musa Ramirez MD         Prior to Admission medications    Medication Sig Start Date End Date Taking? Authorizing Provider   aspirin 81 MG EC tablet Take 1 tablet by mouth daily 8/5/21  Yes GLORIA Johnson CNP   nitroGLYCERIN (NITROSTAT) 0.4 MG SL tablet up to max of 3 total doses.  If no relief after 1 dose, call 911. 8/4/21  Yes GLORIA Johnson CNP   atorvastatin (LIPITOR) 80 MG tablet Take 1 tablet by mouth nightly 8/4/21  Yes GLORIA Johnson CNP   losartan (COZAAR) 100 MG tablet Take 1 tablet by mouth daily 8/5/21  Yes GLORIA Johnson CNP   carvedilol (COREG) 12.5 MG tablet Take 1 tablet by mouth 2 times daily (with meals) 8/4/21  Yes GLORIA Johnson CNP   NIFEdipine (ADALAT CC) 60 MG extended release tablet Take 1 tablet by mouth daily 8/5/21  Yes GLORIA Johnson CNP   ticagrelor (BRILINTA) 90 MG TABS tablet Take 1 tablet by mouth 2 times daily 8/4/21  Yes GLORIA Johnson CNP   pantoprazole (PROTONIX) 40 MG tablet Take 1 tablet by mouth 2 times daily 7/30/21  Yes Moi Demarco MD   famotidine (PEPCID) 20 MG tablet Take 1 tablet by mouth 2 times daily as needed (heartburn, indigestion) 7/29/21 Yes Elvia Forman MD   sucralfate (CARAFATE) 1 GM tablet Take 1 tablet by mouth 4 times daily 21  Yes Elvia Forman MD   Blood Pressure Monitor KIT Use once daily 21  Yes Elvia Forman MD   metFORMIN (GLUCOPHAGE) 500 MG tablet Take 500 mg by mouth 2 times daily (with meals). Yes Historical Provider, MD   glimepiride (AMARYL) 2 MG tablet Take 2 mg by mouth every morning (before breakfast).    Yes Historical Provider, MD   Scheduled Meds:   carvedilol  6.25 mg Oral BID WC    sodium chloride flush  5-40 mL Intravenous 2 times per day    enoxaparin  40 mg Subcutaneous Q24H    aspirin  81 mg Oral Daily    atorvastatin  80 mg Oral Nightly    glipiZIDE  5 mg Oral QAM AC    sucralfate  1 g Oral 4x Daily AC & HS    ticagrelor  90 mg Oral BID    [Held by provider] NIFEdipine  60 mg Oral Daily     Continuous Infusions:   sodium chloride      sodium chloride 100 mL/hr at 21 0602     PRN Meds:.sodium chloride flush, sodium chloride, ondansetron **OR** ondansetron, polyethylene glycol, acetaminophen **OR** acetaminophen, famotidine    Allergies   Allergen Reactions    Bactrim [Sulfamethoxazole-Trimethoprim] Swelling    Lasix [Furosemide] Other (See Comments)    Rocephin [Ceftriaxone] Hives     Family History   Problem Relation Age of Onset    Cancer Mother     Cancer Maternal Aunt     Cancer Maternal Grandmother      Social History     Socioeconomic History    Marital status:      Spouse name: Not on file    Number of children: Not on file    Years of education: Not on file    Highest education level: Not on file   Occupational History    Not on file   Tobacco Use    Smoking status: Former Smoker     Quit date: 2013     Years since quittin.9   Substance and Sexual Activity    Alcohol use: No    Drug use: Not on file    Sexual activity: Not on file   Other Topics Concern    Not on file   Social History Narrative    Not on file     Social Determinants of Health Financial Resource Strain:     Difficulty of Paying Living Expenses:    Food Insecurity:     Worried About Running Out of Food in the Last Year:     [address] in the Last Year:    Transportation Needs:     Lack of Transportation (Medical):  Lack of Transportation (Non-Medical):    Physical Activity:     Days of Exercise per Week:     Minutes of Exercise per Session:    Stress:     Feeling of Stress :    Social Connections:     Frequency of Communication with Friends and Family:     Frequency of Social Gatherings with Friends and Family:     Attends [employer]oration[employer] Services:     Active Member of Clubs or Organizations:     Attends Club or Organization Meetings:     Marital Status:    Intimate Partner Violence:     Fear of Current or Ex-Partner:     Emotionally Abused:     Physically Abused:     Sexually Abused:      ROS:   Constitutional: Denies any recent wt change. Eyes:  Denies any blurring or double vision, no glaucoma  Ears/Nose/Mouth/Throat:  Denies any chronic sinus/rhinitis, bleeding gums  Cardiovascular:  As described above. Respiratory:  Denies any frequent cough, wheezing or coughing up blood  Genitourinary:  Denies difficulty with urination and kidney stones  Gastrointestinal:  Denies any chronic problems with abdominal pain, nausea, vomiting or diarrhea  Musculoskeletal:  Denies any joint pain, back pain, or difficulty walking  Integumentary:  Denies any rash  Neurological:  No numbness or tingling  Endocrine:  Denies any polydipsia. Hematologic/Lymphatic:  Denies any hemorrhage or lymphatic drainage problems.   Labs:  CBC:   Recent Labs     08/04/21  0422 08/05/21  1533 08/06/21  0334   WBC 13.1* 14.6* 13.1*   HGB 15.2 14.4 13.1*   HCT 46.1 41.3* 39.3*   MCV 91.8 87.9 91.6    194 217     BMP:   Recent Labs     08/04/21  0422 08/05/21  1533 08/06/21  0334    137 139   K 4.5 4.2 3.5    108 109   CO2 21* 17* 19*   BUN 25* 42* 34*   CREATININE 0.9 1.4* 1.0 MG  --   --  1.8     Accucheck Glucoses:   Recent Labs     08/03/21  1149 08/03/21  1637 08/03/21  2034 08/04/21  0820 08/05/21  1429   POCGLU 158* 172* 143* 143* 188*     Cardiac Enzymes: No results for input(s): CKTOTAL, CKMB, CKMBINDEX, TROPONINI in the last 72 hours. PT/INR: No results for input(s): PROTIME, INR in the last 72 hours. APTT: No results for input(s): APTT in the last 72 hours.   Liver Profile:  Lab Results   Component Value Date    AST 10 07/29/2021    ALT 10 07/29/2021    BILIDIR <0.2 07/29/2021    BILITOT 0.4 07/29/2021    ALKPHOS 103 07/29/2021     Lab Results   Component Value Date    CHOL 119 08/04/2021    HDL 37 08/04/2021    TRIG 115 08/04/2021     TSH:   Lab Results   Component Value Date    TSH 1.550 08/05/2021     UA: No results found for: NITRITE, COLORU, PHUR, LABCAST, WBCUA, RBCUA, MUCUS, TRICHOMONAS, YEAST, BACTERIA, CLARITYU, SPECGRAV, LEUKOCYTESUR, UROBILINOGEN, BILIRUBINUR, BLOODU, GLUCOSEU, AMORPHOUS      Physical Exam:  Vitals:    08/06/21 0339   BP: 117/70   Pulse: 71   Resp: 16   Temp: 97.8 °F (36.6 °C)   SpO2: 97%        Intake/Output Summary (Last 24 hours) at 8/6/2021 0845  Last data filed at 8/6/2021 0840  Gross per 24 hour   Intake 917.3 ml   Output 1275 ml   Net -357.7 ml      General:  No acute distress  Neck: Supple, no JVD, no carotid bruits  Heart: Regular rhythm normal S1 and S2, no rubs, murmurs or gallops  Lungs: clear to ascultation no rales, wheezes, or rhonchi  Abdomen: positive bowel sounds, soft, non-tender, non-distended, no bruits, no masses  Extremities:no clubbing, cyanosis or edema, amputated left 2nd, 3rd and 4th toes   Neurologic: alert and oriented x 3, cranial nerves 2-12 grossly intact, motor and sensory intact, moving all extremities  Skin: No rashes    Assessment:  Orthostatic hypotension, likely iatrogenic due to recent medication change   Primary HTN   CAD s/p PCI of LAD, left posterolateral branch and 2nd obtuse marginal artery   DM2 Elevated troponins     Plan:  1. Discontinue procardia and cozaar  2. Decrease coreg to 6.25 BID    3. Bolus 1L NS and continuous infusion at 100 mL/hr   4. Trend orthostats q6h until pt is no longer positive   5. Monitor BP at home   6. Troponins likely due to BOONE, no indication to continue trending   7. Follow up with outpatient cardiology for medical management   8. Further recommendations based on clinical course and findings     Thank you for allowing us to participate in the care of this patient. Please do not hesitate to call us with questions. Electronically signed by Waldo Conde DO on 8/6/2021 at 8:45 AM    Attending Supervising Physician's [facility] [address] Statement  I performed a history and physical examination on the patient and discussed the management with the resident physician. I reviewed and agree with the findings and plan as documented in the resident's note.     Electronically signed by Sheryl Martinez MD on 8/6/21 at 6:00 PM EDT  Interventional Cardiology - [facility]

## 2021-08-06 NOTE — PROGRESS NOTES
Pharmacy Medication History Note      List of current medications patient is taking is complete. Source of information: patient, recent hospital discharge    Changes made to medication list:  Medications removed (include reason, ex. therapy complete or physician discontinued): NA    Medications added/doses adjusted: NA    Other notes (ex. Recent course of antibiotics, Coumadin dosing):  Patient was recently discharged from  and was home for about one day before presenting in ED. He took all of his medications as prescribed the morning before admission, which may have resulted in his hypotension. No medication changes have occurred since recent discharge. Denies use of other OTC or herbal medications.       Allergies reviewed      Electronically signed by Shannon Fletcher on 8/6/2021 at 10:09 AM

## 2021-08-06 NOTE — PROGRESS NOTES
Hospitalist Progress Note    Patient:  Charles Alberto      Unit/Bed:3B-37/037-A    YOB: 1965    MRN: [id number]       Acct: [de-identified]     PCP: Jessica Pack MD    Date of Admission: 8/5/2021    Assessment/Plan:    Anticipated Discharge in :     BRUNILDA/Nathaniel Aguilar [address] Problems    Diagnosis Date Noted    Acute kidney injury [facility]) [N17.9] 08/05/2021     Syncope secondary to orthostatic hypotension  Likely due to medications  Hold losartan due to BOONE  Hold nifedipine  Decrease carvedilol dose to 6.25mg, hold with parameters  orthostatic vitals positive  Cardiology consult for medication adjustments  Continue IVF for now     MV CAD s/p PCI 8/3  Has stent on obtuse marginal branch, left posterolateral branch and left anterior descending artery   Will need staged PCI RCA as outpatient  Continue aspirin, statin and brilinta     Elevated troponins  Likely due to recent cath and BOONE  Monitor for now  Cardiology consulted     Leukocytosis   Likely reactive     Essential HTN, now with hypotension  See treatment in #1     Acute Kindey Injury, resolved  Due to hypotension, contrast induced and ARB   Hold losartan and metformin     NAG Metabolic acidosis, improving  Likely due to BOONE  BMP in AM     DM type II  Hold metformin  Ok to resume glimepiride     Obesity  Diet and lifestyle changes     Hx of CVA    Disposition  Likely to be discharged tomorrow  Follow-up with primary physician/cardio as outpatient once discharged      Chief Complaint:  syncope    Hospital Course:  60 y.o. male, with a PMH MV CAD with recent PCI of obtuse marginal branch, left posterolateral branch and left anterior descending artery last 8/3 and was supposed to come back for staged PCI of RCA, who presented to [address] [place] [address] with complaints of syncopal episode today, witnessed by his wife. Patient states that he just felt weak and dizzy after he stood up, accompanied by blurry vision.  Wife was able to assist him, patient did not fall or hit his head. His SBP at home was noted to be low.      He denies CP, SOB, palpitations, N/V, abdominal pain or diarrhea. No fever or chills. His BP in ED was noted to be 88/40, improved after 1L bolus. Patient's trop and creatinine also noted to be elevated. He was re-admitted under the hospitalist service. Cardiology consulted for medication adjustment. Subjective:   Patient seen and examined, appears comfortable in bed, without any signs of cardiorespiratory distress. Positive orthostatics, but better today, afebrile, saturating well on room air. He was able to walk around the unit without any symptoms. Has rashes on his back, slightly pruritic, given hydroxyzine prn. Medications:  Reviewed    Infusion Medications    sodium chloride      sodium chloride 100 mL/hr at 08/06/21 0602     Scheduled Medications    sodium chloride flush  5-40 mL Intravenous 2 times per day    enoxaparin  40 mg Subcutaneous Q24H    aspirin  81 mg Oral Daily    atorvastatin  80 mg Oral Nightly    carvedilol  12.5 mg Oral BID WC    glipiZIDE  5 mg Oral QAM AC    sucralfate  1 g Oral 4x Daily AC & HS    ticagrelor  90 mg Oral BID    [Held by provider] NIFEdipine  60 mg Oral Daily     PRN Meds: sodium chloride flush, sodium chloride, ondansetron **OR** ondansetron, polyethylene glycol, acetaminophen **OR** acetaminophen, famotidine      Intake/Output Summary (Last 24 hours) at 8/6/2021 0823  Last data filed at 8/6/2021 0606  Gross per 24 hour   Intake 917.3 ml   Output 1025 ml   Net -107.7 ml       Diet:  ADULT DIET; Regular; Low Fat/Low Chol/High Fiber/NERY    Exam:  /70   Pulse 71   Temp 97.8 °F (36.6 °C) (Tympanic)   Resp 16   Ht 5' 10\" (1.778 m)   Wt 231 lb (104.8 kg)   SpO2 97%   BMI 33.15 kg/m²     General appearance: No apparent distress, appears stated age and cooperative. HEENT: Pupils equal, round, and reactive to light. Conjunctivae/corneas clear.   Neck: Supple, with full range of motion. No jugular venous distention. Trachea midline. Respiratory:  Normal respiratory effort. Clear to auscultation, bilaterally without Rales/Wheezes/Rhonchi. Cardiovascular: Regular rate and rhythm with normal S1/S2 without murmurs, rubs or gallops. Abdomen: Soft, non-tender, non-distended with normal bowel sounds. Musculoskeletal: passive and active ROM x 4 extremities. Skin: Skin color, texture, turgor normal.  Faint pruritic rashes on the back  Neurologic:  Neurovascularly intact without any focal sensory/motor deficits. Cranial nerves: II-XII intact, grossly non-focal.  Psychiatric: Alert and oriented, thought content appropriate, normal insight  Capillary Refill: Brisk,< 3 seconds   Peripheral Pulses: +2 palpable, equal bilaterally       Labs:   Recent Labs     08/04/21  0422 08/05/21  1533 08/06/21  0334   WBC 13.1* 14.6* 13.1*   HGB 15.2 14.4 13.1*   HCT 46.1 41.3* 39.3*    194 217     Recent Labs     08/04/21  0422 08/05/21  1533 08/06/21  0334    137 139   K 4.5 4.2 3.5    108 109   CO2 21* 17* 19*   BUN 25* 42* 34*   CREATININE 0.9 1.4* 1.0   CALCIUM 9.3 8.3* 8.2*     No results for input(s): AST, ALT, BILIDIR, BILITOT, ALKPHOS in the last 72 hours. No results for input(s): INR in the last 72 hours. No results for input(s): Marylin Basques in the last 72 hours. Urinalysis:    No results found for: Yareli Fast, BACTERIA, RBCUA, BLOODU, [address], Leonie [place] [address]    Radiology:  CT Head WO Contrast   Final Result      1. No acute intracranial hemorrhage, mass effect or midline shift. 2. Paranasal sinus disease. **This report has been created using voice recognition software. It may contain minor errors which are inherent in voice recognition technology. **      Final report electronically signed by Dr Yannick Petty on 8/5/2021 3:33 PM      XR CHEST PORTABLE   Final Result   There is no acute intrathoracic process.                **This report has been created using voice recognition software. It may contain minor errors which are inherent in voice recognition technology. **      Final report electronically signed by Dr Amrit Walker on 8/5/2021 2:40 PM          Diet: ADULT DIET;  Regular; Low Fat/Low Chol/High Fiber/NERY    DVT prophylaxis: [] Lovenox                                 [] SCDs                                 [] SQ Heparin                                 [] Encourage ambulation           [] Already on Anticoagulation     Disposition:    [] Home       [] TCU       [] Rehab       [] Psych       [] SNF       [] [place]       [] Other-    Code Status: Full Code    PT/OT Eval Status:         Electronically signed by Jeremías Wallace MD on 8/6/2021 at 8:23 AM

## 2021-08-06 NOTE — H&P
History & Physical        Patient:  Radha Ku  YOB: 1965    MRN: [id number]     Acct: [de-identified]    PCP: Jesika Jefferson MD    Date of Admission: 8/5/2021    Date of Service: Pt seen/examined on 08/05/21      Chief Complaint:  syncope      History Of Present Illness: 60 y.o. male, with a PMH MV CAD with recent PCI of obtuse marginal branch, left posterolateral branch and left anterior descending artery last 8/3 and was supposed to come back for staged PCI of RCA, who presented to [facility] with complaints of syncopal episode today, witnessed by his wife. Patient states that he just felt weak and dizzy after he stood up, accompanied by blurry vision. Wife was able to assist him, patient did not fall or hit his head. His SBP at home was noted to be low. He denies CP, SOB, palpitations, N/V, abdominal pain or diarrhea. No fever or chills. His BP in ED was noted to be 88/40, improved after 1L bolus. Patient's trop and creatinine also noted to be elevated. He was re-admitted under the hospitalist service. Cardiology consulted for medication adjustment. Past Medical History:          Diagnosis Date    Arthritis     Diabetes mellitus ([facility] [address]     Hypertension     Pneumonia     Unspecified cerebral artery occlusion with cerebral infarction        Past Surgical History:          Procedure Laterality Date    ABDOMEN SURGERY      FEMUR FRACTURE SURGERY Left     FRACTURE SURGERY      SKIN GRAFT      TIBIA FRACTURE SURGERY Right     TOE AMPUTATION Left     2,3,4,5 TOES    VENA CAVA FILTER PLACEMENT         Medications Prior to Admission:      Prior to Admission medications    Medication Sig Start Date End Date Taking? Authorizing Provider   aspirin 81 MG EC tablet Take 1 tablet by mouth daily 8/5/21  Yes GLORIA Matthews - CNP   nitroGLYCERIN (NITROSTAT) 0.4 MG SL tablet up to max of 3 total doses.  If no relief after 1 dose, call 911. 8/4/21  Yes GLORIA Villa CNP   atorvastatin (LIPITOR) 80 MG tablet Take 1 tablet by mouth nightly 8/4/21  Yes GLORIA Villa CNP   losartan (COZAAR) 100 MG tablet Take 1 tablet by mouth daily 8/5/21  Yes GLORIA Villa CNP   carvedilol (COREG) 12.5 MG tablet Take 1 tablet by mouth 2 times daily (with meals) 8/4/21  Yes GLORIA Villa CNP   NIFEdipine (ADALAT CC) 60 MG extended release tablet Take 1 tablet by mouth daily 8/5/21  Yes GLORIA Villa CNP   ticagrelor (BRILINTA) 90 MG TABS tablet Take 1 tablet by mouth 2 times daily 8/4/21  Yes GLORIA Villa CNP   pantoprazole (PROTONIX) 40 MG tablet Take 1 tablet by mouth 2 times daily 7/30/21  Yes Lina Bowman MD   famotidine (PEPCID) 20 MG tablet Take 1 tablet by mouth 2 times daily as needed (heartburn, indigestion) 7/29/21  Yes Willis Pérez MD   sucralfate (CARAFATE) 1 GM tablet Take 1 tablet by mouth 4 times daily 7/29/21  Yes Willis Pérez MD   Blood Pressure Monitor KIT Use once daily 7/29/21  Yes Willis Pérez MD   metFORMIN (GLUCOPHAGE) 500 MG tablet Take 500 mg by mouth 2 times daily (with meals). Yes Historical Provider, MD   glimepiride (AMARYL) 2 MG tablet Take 2 mg by mouth every morning (before breakfast). Yes Historical Provider, MD       Allergies:  Bactrim [sulfamethoxazole-trimethoprim], Lasix [furosemide], and Rocephin [ceftriaxone]    Social History:        TOBACCO:   reports that he quit smoking about 7 years ago. He does not have any smokeless tobacco history on file. ETOH:   reports no history of alcohol use. Family History:      Reviewed in detail and negative for DM, CAD, Cancer, CVA. Positive as follows:        Problem Relation Age of Onset    Cancer Mother     Cancer Maternal Aunt     Cancer Maternal Grandmother        Diet:  ADULT DIET; Regular; Low Fat/Low Chol/High Fiber/NERY    REVIEW OF SYSTEMS:   Pertinent positives as noted in the HPI.  All other systems reviewed and negative. PHYSICAL EXAM:    BP (!) 126/99   Pulse 73   Temp 97.6 °F (36.4 °C) (Oral)   Resp 18   Ht 5' 10\" (1.778 m)   Wt 231 lb (104.8 kg)   SpO2 98%   BMI 33.15 kg/m²     General appearance:  No apparent distress, appears stated age and cooperative. HEENT:  Normal cephalic, atraumatic without obvious deformity. Pupils equal, round, and reactive to light. Extra ocular muscles intact. Conjunctivae/corneas clear. Neck: Supple, with full range of motion. No jugular venous distention. Trachea midline. Respiratory:  Normal respiratory effort. Clear to auscultation, bilaterally without Rales/Wheezes/Rhonchi. Cardiovascular:  Regular rate and rhythm with normal S1/S2 without murmurs, rubs or gallops. Abdomen: Soft, non-tender, non-distended with normal bowel sounds. Musculoskeletal:  No clubbing, cyanosis or edema bilaterally. Full range of motion without deformity. Skin: Skin color, texture, turgor normal.  No rashes or lesions. Neurologic:  Neurovascularly intact without any focal sensory/motor deficits. Cranial nerves: II-XII intact, grossly non-focal.  Psychiatric:  Alert and oriented, thought content appropriate, normal insight  Capillary Refill: Brisk,< 3 seconds   Peripheral Pulses: +2 palpable, equal bilaterally       Labs:     Recent Labs     08/03/21  0340 08/04/21  0422 08/05/21  1533   WBC 9.7 13.1* 14.6*   HGB 14.8 15.2 14.4   HCT 44.5 46.1 41.3*    253 194     Recent Labs     08/03/21  0340 08/04/21  0422 08/05/21  1533    138 137   K 3.7 4.5 4.2    105 108   CO2 21* 21* 17*   BUN 22 25* 42*   CREATININE 0.8 0.9 1.4*   CALCIUM 8.7 9.3 8.3*     No results for input(s): AST, ALT, BILIDIR, BILITOT, ALKPHOS in the last 72 hours. Recent Labs     08/03/21  0528   INR 0.98     No results for input(s): Payam Hug in the last 72 hours.     Urinalysis:    No results found for: NITRU, WBCUA, BACTERIA, RBCUA, BLOODU, SPECGRAV, GLUCOSEU    Intake & Output:  No intake/output data recorded. I/O this shift:  In: 100.5 [P.O.:50; I.V.:50.5]  Out: 250 [Urine:250]      Radiology:       CT Head WO Contrast   Final Result      1. No acute intracranial hemorrhage, mass effect or midline shift. 2. Paranasal sinus disease. **This report has been created using voice recognition software. It may contain minor errors which are inherent in voice recognition technology. **      Final report electronically signed by Dr Landy Jorge on 8/5/2021 3:33 PM      XR CHEST PORTABLE   Final Result   There is no acute intrathoracic process. **This report has been created using voice recognition software. It may contain minor errors which are inherent in voice recognition technology. **      Final report electronically signed by Dr Landy Jorge on 8/5/2021 2:40 PM           DVT prophylaxis:    Code Status: Full Code      PT/OT Eval Status:     Robert Roger Problems    Diagnosis Date Noted    Acute kidney injury ([facility] [address] [N17.9] 08/05/2021       PLAN:    Syncope secondary to hypotension  Likely due to medications  Hold losartan due to BOONE  Hold nifedipine  Resume carvedilol, hold with parameters  Obtain orthostatic vitals  Cardiology consult for medication adjustments  Continue IVF for now    MV CAD s/p PCI 8/3  Has stent on obtuse marginal branch, left posterolateral branch and left anterior descending artery   Will need staged PCI RCA as outpatient  Continue aspirin, statin and brilinta    Elevated troponins  Likely due to recent cath and BOONE  Monitor for now  Cardiology consulted    Leukocytosis   Likely reactive    Essential HTN, now with hypotension  See treatment in #1    Acute Kindey Injury   Due to hypotension, contrast induced and ARB   Hold losartan and metformin    NAG Metabolic acidosis  Likely due to BOONE  BMP in AM    DM type II  Hold metformin  Ok to resume glimepiride    Obesity  Diet and lifestyle changes    Hx of CVA              Thank you Gianin Sanchez MD for the opportunity to be involved in this patient's care.     Electronically signed by Calvin Daigle MD on 8/5/2021 at 10:03 PM

## 2021-08-06 NOTE — DISCHARGE SUMMARY
Hospital Medicine Discharge Summary      Patient Identification:   Jeison Rangel   : 1965  MRN: [id number]   Account: [de-identified]      Patient's PCP: Sonia Cee MD    Admit Date: 2021     Discharge Date: 2021      Admitting Physician: Lcaie Young MD     Discharge Physician: Jennifer Castañeda MD     Discharge Diagnoses: Active Hospital Problems    Diagnosis Date Noted    S/P cardiac cath [Z98.890]      Priority: High    Unstable angina (HCC) [I20.0]      Priority: High    CAD in native artery [I25.10]      Priority: High    Chest pain [R07.9] 2021    Essential hypertension [I10] 2021    Heartburn [R12] 2021    Diabetes ([facility] [address] [E11.9] 2013     Unstable angina  MV CAD s/p PCI 8/3  Has stent on obtuse marginal branch, left posterolateral branch and left anterior descending artery   Will need staged PCI RCA as outpatient  Continue aspirin, statin and brilinta     Leukocytosis   Likely reactive     Essential HTN  meds per cardiology    DM type II  Resume home medications     Obesity  Diet and lifestyle changes     Hx of CVA      The patient was seen and examined on day of discharge and this discharge summary is in conjunction with any daily progress note from day of discharge. Hospital Course:   Patient is a very pleasant 77-year-old male with multiple coronary artery disease risk factors include diabetes, obesity, male gender, and uncontrolled hypertension. The patient has had persistent chest pain for several weeks and was recently evaluated in the ER. At that time, he was prescribed PPI twice daily, H2 blocker, and Carafate. His symptoms have persisted over the preceding several days, typically following a meal and described as a burning sensation with some radiation to his left arm. Patient is able to exercise without chest pain.      Patient evaluated following left heart catheterization s/p stent placement  on obtuse marginal branch, left posterolateral branch and left anterior descending artery. He will need staged PCI for RCA as outpatient. Medications for discharge per cardiology. Patient was discharged stable. Follow up with cardiology in 2-3 weeks as outpatient. Exam:     Vitals:  Vitals:    08/03/21 2208 08/04/21 0115 08/04/21 0338 08/04/21 0945   BP: (!) 147/80 (!) 148/73 129/71 (!) 140/67   Pulse: 63 67 66 62   Resp: 20 18 16 16   Temp: 98.1 °F (36.7 °C) 98.2 °F (36.8 °C) 97.6 °F (36.4 °C) 98 °F (36.7 °C)   TempSrc: Oral Oral Oral Oral   SpO2: 98% 95% 95%    Weight:       Height:         Weight: Weight: 231 lb 4.8 oz (104.9 kg)     24 hour intake/output:No intake or output data in the 24 hours ending 08/05/21 2244      General appearance:  No apparent distress, appears stated age and cooperative. HEENT:  Normal cephalic, atraumatic without obvious deformity. Pupils equal, round, and reactive to light. Extra ocular muscles intact. Conjunctivae/corneas clear. Neck: Supple, with full range of motion. No jugular venous distention. Trachea midline. Respiratory:  Normal respiratory effort. Clear to auscultation, bilaterally without Rales/Wheezes/Rhonchi. Cardiovascular:  Regular rate and rhythm with normal S1/S2 without murmurs, rubs or gallops. Abdomen: Soft, non-tender, non-distended with normal bowel sounds. Musculoskeletal:  No clubbing, cyanosis or edema bilaterally. Full range of motion without deformity. Skin: Skin color, texture, turgor normal.  No rashes or lesions. Neurologic:  Neurovascularly intact without any focal sensory/motor deficits. Cranial nerves: II-XII intact, grossly non-focal.  Psychiatric:  Alert and oriented, thought content appropriate, normal insight  Capillary Refill: Brisk,< 3 seconds   Peripheral Pulses: +2 palpable, equal bilaterally       Labs:  For convenience and continuity at follow-up the following most recent labs are provided:      CBC:    Lab Results   Component Value Date    WBC 14.6 08/05/2021    HGB 14.4 08/05/2021    HCT 41.3 08/05/2021     08/05/2021       Renal:    Lab Results   Component Value Date     08/05/2021    K 4.2 08/05/2021     08/05/2021    CO2 17 08/05/2021    BUN 42 08/05/2021    CREATININE 1.4 08/05/2021    CALCIUM 8.3 08/05/2021         Significant Diagnostic Studies    Radiology:   XR CHEST PORTABLE   Final Result   Impression:   1. No acute cardiopulmonary disease. This document has been electronically signed by: Sandra Freitas MD on    08/02/2021 08:08 AM             Consults:     [address] [place] [address] CONSULT TO CARDIOTHORACIC SURGERY  IP CONSULT TO CARDIAC REHAB    Disposition:    [x] Home       [] TCU       [] Rehab       [] Psych       [] SNF       [] [place]       [] Other-    Condition at Discharge: Stable    Code Status:  Full Code     Patient Instructions:    Discharge lab work: Activity: activity as tolerated  Diet: No diet orders on file      Follow-up visits:   Radha Martinez MD  [address] [place] [address]  [phone]    On 8/11/2021  10:10 am         Discharge Medications:      Kevin Thompson   Home Medication Instructions [id number]    Printed on:08/05/21 2240   Medication Information                      aspirin 81 MG EC tablet  Take 1 tablet by mouth daily             atorvastatin (LIPITOR) 80 MG tablet  Take 1 tablet by mouth nightly             Blood Pressure Monitor KIT  Use once daily             carvedilol (COREG) 12.5 MG tablet  Take 1 tablet by mouth 2 times daily (with meals)             famotidine (PEPCID) 20 MG tablet  Take 1 tablet by mouth 2 times daily as needed (heartburn, indigestion)             glimepiride (AMARYL) 2 MG tablet  Take 2 mg by mouth every morning (before breakfast).              losartan (COZAAR) 100 MG tablet  Take 1 tablet by mouth daily             metFORMIN (GLUCOPHAGE) 500 MG tablet  Take 500 mg by mouth 2 times daily (with meals). NIFEdipine (ADALAT CC) 60 MG extended release tablet  Take 1 tablet by mouth daily             nitroGLYCERIN (NITROSTAT) 0.4 MG SL tablet  up to max of 3 total doses. If no relief after 1 dose, call 911. pantoprazole (PROTONIX) 40 MG tablet  Take 1 tablet by mouth 2 times daily             sucralfate (CARAFATE) 1 GM tablet  Take 1 tablet by mouth 4 times daily             ticagrelor (BRILINTA) 90 MG TABS tablet  Take 1 tablet by mouth 2 times daily                 Time Spent on discharge is more than 45 minutes in the examination, evaluation, counseling and review of medications and discharge plan. Discharge Medications for PCI/MI (performed or attempted):   ASA:   yes    Statin:   yes  ACE/ARB:  yes   P2Y12 Inhibitor:  yes   Beta Blocker:   Yes  Nitro SL:   yes           Signed: Thank you Nicki Ahamdi MD for the opportunity to be involved in this patient's care.     Electronically signed by Corinna Hoyt MD on 8/5/2021 at 10:44 PM

## 2021-08-06 NOTE — PLAN OF CARE
Problem: Pain:  Goal: Pain level will decrease  Description: Pain level will decrease  Outcome: Ongoing  Note: Pain level will decrease  Goal: Control of acute pain  Description: Control of acute pain  Outcome: Ongoing  Note: Control of acute pain  Goal: Control of chronic pain  Description: Control of chronic pain  Outcome: Ongoing  Note: Control of chronic pain     Problem: Falls - Risk of:  Goal: Will remain free from falls  Description: Will remain free from falls  Outcome: Ongoing  Note: Will remain free from falls  Goal: Absence of physical injury  Description: Absence of physical injury  Outcome: Ongoing  Note: Absence of physical injury     Care plan reviewed with patient . Patient  verbalize understanding of the plan of care and contribute to goal setting.

## 2021-08-06 NOTE — CARE COORDINATION
8/6/21, 9:23 AM EDT  DISCHARGE PLANNING EVALUATION:    Nestor Muhammad       Admitted: 8/5/2021/ 2 Rehab Blayne day: 0   Location: [address] [place] [address]-A Reason for admit: Orthostatic hypotension [I95.1]  BOONE (acute kidney injury) ([facility] [address] [N17.9]  Near syncope [R55]  Acute kidney injury ([facility] [address] [N17.9]   PMH:  has a past medical history of Arthritis, Diabetes mellitus ([facility] [address] Hypertension, Pneumonia, and Unspecified cerebral artery occlusion with cerebral infarction. Procedure: None  Barriers to Discharge:  Admitted through ED with syncopal episode. Pt had recent with PCI with stents, discharged 8/3/21. Ortho static BP's: lying 110/58, sitting 102/55, standing 79/46. BUN 42 and 34, creatinine 1.4 and 1.0. CO2 17 and 19. Troponins 0.071, 0.069, 0.093 and 0.090. WBC 14.6 and 13.1. Consulting Cardiology. IVF, baby ASA, Lovenox, Pepcid prn, glucotrol, Procardia XL (held), Carafate, Brilinta, Lipitor, Coreg. Given fluid bolus x2. PCP: Zohra Walker MD  Readmission Risk Score: 16%    Patient Goals/Plan/Treatment Preferences: Spoke with Leena Johnson and his wife. He states he is independent and drives. He has a cane at home, but no other DME. Denies [facility] or the need for it. Verified his insurance and PCP. Monitor for needs. Transportation/Food Security/Housekeeping Addressed:  No issues identified.

## 2021-08-07 LAB
ANION GAP SERPL CALCULATED.3IONS-SCNC: 12 MEQ/L (ref 8–16)
BASOPHILS # BLD: 0.2 %
BASOPHILS ABSOLUTE: 0 THOU/MM3 (ref 0–0.1)
BUN BLDV-MCNC: 22 MG/DL (ref 7–22)
CALCIUM SERPL-MCNC: 8.3 MG/DL (ref 8.5–10.5)
CHLORIDE BLD-SCNC: 110 MEQ/L (ref 98–111)
CO2: 17 MEQ/L (ref 23–33)
CREAT SERPL-MCNC: 0.9 MG/DL (ref 0.4–1.2)
EOSINOPHIL # BLD: 8.3 %
EOSINOPHILS ABSOLUTE: 1.1 THOU/MM3 (ref 0–0.4)
ERYTHROCYTE [DISTWIDTH] IN BLOOD BY AUTOMATED COUNT: 13.4 % (ref 11.5–14.5)
ERYTHROCYTE [DISTWIDTH] IN BLOOD BY AUTOMATED COUNT: 45.2 FL (ref 35–45)
GFR SERPL CREATININE-BSD FRML MDRD: 87 ML/MIN/1.73M2
GLUCOSE BLD-MCNC: 180 MG/DL (ref 70–108)
GLUCOSE BLD-MCNC: 184 MG/DL (ref 70–108)
HCT VFR BLD CALC: 40 % (ref 42–52)
HEMOGLOBIN: 13.4 GM/DL (ref 14–18)
IMMATURE GRANS (ABS): 0.04 THOU/MM3 (ref 0–0.07)
IMMATURE GRANULOCYTES: 0.3 %
LYMPHOCYTES # BLD: 6.6 %
LYMPHOCYTES ABSOLUTE: 0.9 THOU/MM3 (ref 1–4.8)
MAGNESIUM: 1.6 MG/DL (ref 1.6–2.4)
MCH RBC QN AUTO: 30.7 PG (ref 26–33)
MCHC RBC AUTO-ENTMCNC: 33.5 GM/DL (ref 32.2–35.5)
MCV RBC AUTO: 91.7 FL (ref 80–94)
MONOCYTES # BLD: 6.5 %
MONOCYTES ABSOLUTE: 0.9 THOU/MM3 (ref 0.4–1.3)
NUCLEATED RED BLOOD CELLS: 0 /100 WBC
PLATELET # BLD: 221 THOU/MM3 (ref 130–400)
PMV BLD AUTO: 10 FL (ref 9.4–12.4)
POTASSIUM SERPL-SCNC: 3.7 MEQ/L (ref 3.5–5.2)
RBC # BLD: 4.36 MILL/MM3 (ref 4.7–6.1)
SEG NEUTROPHILS: 78.1 %
SEGMENTED NEUTROPHILS ABSOLUTE COUNT: 10.3 THOU/MM3 (ref 1.8–7.7)
SODIUM BLD-SCNC: 139 MEQ/L (ref 135–145)
TROPONIN T: 0.05 NG/ML
WBC # BLD: 13.2 THOU/MM3 (ref 4.8–10.8)

## 2021-08-07 PROCEDURE — 6370000000 HC RX 637 (ALT 250 FOR IP)

## 2021-08-07 PROCEDURE — 96374 THER/PROPH/DIAG INJ IV PUSH: CPT

## 2021-08-07 PROCEDURE — 80048 BASIC METABOLIC PNL TOTAL CA: CPT

## 2021-08-07 PROCEDURE — 36415 COLL VENOUS BLD VENIPUNCTURE: CPT

## 2021-08-07 PROCEDURE — 96372 THER/PROPH/DIAG INJ SC/IM: CPT

## 2021-08-07 PROCEDURE — 6360000002 HC RX W HCPCS: Performed by: FAMILY MEDICINE

## 2021-08-07 PROCEDURE — 6370000000 HC RX 637 (ALT 250 FOR IP): Performed by: FAMILY MEDICINE

## 2021-08-07 PROCEDURE — 84484 ASSAY OF TROPONIN QUANT: CPT

## 2021-08-07 PROCEDURE — 96375 TX/PRO/DX INJ NEW DRUG ADDON: CPT

## 2021-08-07 PROCEDURE — 96361 HYDRATE IV INFUSION ADD-ON: CPT

## 2021-08-07 PROCEDURE — G0378 HOSPITAL OBSERVATION PER HR: HCPCS

## 2021-08-07 PROCEDURE — 93005 ELECTROCARDIOGRAM TRACING: CPT | Performed by: NURSE PRACTITIONER

## 2021-08-07 PROCEDURE — 99232 SBSQ HOSP IP/OBS MODERATE 35: CPT | Performed by: NURSE PRACTITIONER

## 2021-08-07 PROCEDURE — 2580000003 HC RX 258: Performed by: STUDENT IN AN ORGANIZED HEALTH CARE EDUCATION/TRAINING PROGRAM

## 2021-08-07 PROCEDURE — 85027 COMPLETE CBC AUTOMATED: CPT

## 2021-08-07 PROCEDURE — 6360000002 HC RX W HCPCS: Performed by: INTERNAL MEDICINE

## 2021-08-07 PROCEDURE — 96376 TX/PRO/DX INJ SAME DRUG ADON: CPT

## 2021-08-07 PROCEDURE — 99233 SBSQ HOSP IP/OBS HIGH 50: CPT | Performed by: FAMILY MEDICINE

## 2021-08-07 PROCEDURE — 2140000000 HC CCU INTERMEDIATE R&B

## 2021-08-07 PROCEDURE — 83735 ASSAY OF MAGNESIUM: CPT

## 2021-08-07 PROCEDURE — 6370000000 HC RX 637 (ALT 250 FOR IP): Performed by: NURSE PRACTITIONER

## 2021-08-07 PROCEDURE — 82948 REAGENT STRIP/BLOOD GLUCOSE: CPT

## 2021-08-07 RX ORDER — DEXTROSE MONOHYDRATE 50 MG/ML
100 INJECTION, SOLUTION INTRAVENOUS PRN
Status: DISCONTINUED | OUTPATIENT
Start: 2021-08-07 | End: 2021-08-10 | Stop reason: HOSPADM

## 2021-08-07 RX ORDER — ASPIRIN 81 MG/1
TABLET, CHEWABLE ORAL
Status: COMPLETED
Start: 2021-08-07 | End: 2021-08-07

## 2021-08-07 RX ORDER — CLOPIDOGREL BISULFATE 75 MG/1
75 TABLET ORAL DAILY
Status: DISCONTINUED | OUTPATIENT
Start: 2021-08-08 | End: 2021-08-10 | Stop reason: HOSPADM

## 2021-08-07 RX ORDER — CLOPIDOGREL BISULFATE 75 MG/1
300 TABLET ORAL ONCE
Status: COMPLETED | OUTPATIENT
Start: 2021-08-07 | End: 2021-08-07

## 2021-08-07 RX ORDER — NICOTINE POLACRILEX 4 MG
15 LOZENGE BUCCAL PRN
Status: DISCONTINUED | OUTPATIENT
Start: 2021-08-07 | End: 2021-08-10 | Stop reason: HOSPADM

## 2021-08-07 RX ORDER — METHYLPREDNISOLONE SODIUM SUCCINATE 125 MG/2ML
125 INJECTION, POWDER, LYOPHILIZED, FOR SOLUTION INTRAMUSCULAR; INTRAVENOUS ONCE
Status: COMPLETED | OUTPATIENT
Start: 2021-08-07 | End: 2021-08-07

## 2021-08-07 RX ORDER — DIPHENHYDRAMINE HYDROCHLORIDE 50 MG/ML
25 INJECTION INTRAMUSCULAR; INTRAVENOUS EVERY 6 HOURS PRN
Status: DISCONTINUED | OUTPATIENT
Start: 2021-08-07 | End: 2021-08-08

## 2021-08-07 RX ORDER — DIPHENHYDRAMINE HYDROCHLORIDE 50 MG/ML
50 INJECTION INTRAMUSCULAR; INTRAVENOUS ONCE
Status: COMPLETED | OUTPATIENT
Start: 2021-08-07 | End: 2021-08-07

## 2021-08-07 RX ORDER — FAMOTIDINE 20 MG/1
20 TABLET, FILM COATED ORAL 2 TIMES DAILY
Status: DISCONTINUED | OUTPATIENT
Start: 2021-08-07 | End: 2021-08-10 | Stop reason: HOSPADM

## 2021-08-07 RX ORDER — METHYLPREDNISOLONE SODIUM SUCCINATE 40 MG/ML
40 INJECTION, POWDER, LYOPHILIZED, FOR SOLUTION INTRAMUSCULAR; INTRAVENOUS EVERY 8 HOURS
Status: DISCONTINUED | OUTPATIENT
Start: 2021-08-08 | End: 2021-08-08

## 2021-08-07 RX ORDER — DEXTROSE MONOHYDRATE 25 G/50ML
12.5 INJECTION, SOLUTION INTRAVENOUS PRN
Status: DISCONTINUED | OUTPATIENT
Start: 2021-08-07 | End: 2021-08-10 | Stop reason: HOSPADM

## 2021-08-07 RX ADMIN — TICAGRELOR 90 MG: 90 TABLET ORAL at 08:30

## 2021-08-07 RX ADMIN — HYDROXYZINE HYDROCHLORIDE 25 MG: 25 TABLET ORAL at 23:37

## 2021-08-07 RX ADMIN — METHYLPREDNISOLONE SODIUM SUCCINATE 125 MG: 125 INJECTION, POWDER, FOR SOLUTION INTRAMUSCULAR; INTRAVENOUS at 08:48

## 2021-08-07 RX ADMIN — GLIPIZIDE 5 MG: 5 TABLET ORAL at 08:29

## 2021-08-07 RX ADMIN — ENOXAPARIN SODIUM 40 MG: 40 INJECTION SUBCUTANEOUS at 19:37

## 2021-08-07 RX ADMIN — SUCRALFATE 1 G: 1 TABLET ORAL at 06:24

## 2021-08-07 RX ADMIN — ASPIRIN 81 MG: 81 TABLET, CHEWABLE ORAL at 08:47

## 2021-08-07 RX ADMIN — FAMOTIDINE 20 MG: 20 TABLET, FILM COATED ORAL at 19:37

## 2021-08-07 RX ADMIN — FAMOTIDINE 20 MG: 20 TABLET, FILM COATED ORAL at 08:47

## 2021-08-07 RX ADMIN — CARVEDILOL 6.25 MG: 6.25 TABLET, FILM COATED ORAL at 08:47

## 2021-08-07 RX ADMIN — ATORVASTATIN CALCIUM 80 MG: 80 TABLET, FILM COATED ORAL at 19:37

## 2021-08-07 RX ADMIN — HYDROXYZINE HYDROCHLORIDE 25 MG: 25 TABLET ORAL at 19:10

## 2021-08-07 RX ADMIN — SUCRALFATE 1 G: 1 TABLET ORAL at 16:45

## 2021-08-07 RX ADMIN — CLOPIDOGREL BISULFATE 300 MG: 75 TABLET ORAL at 11:15

## 2021-08-07 RX ADMIN — SUCRALFATE 1 G: 1 TABLET ORAL at 11:17

## 2021-08-07 RX ADMIN — HYDROXYZINE HYDROCHLORIDE 25 MG: 25 TABLET ORAL at 04:05

## 2021-08-07 RX ADMIN — DIPHENHYDRAMINE HYDROCHLORIDE 25 MG: 50 INJECTION INTRAMUSCULAR; INTRAVENOUS at 23:13

## 2021-08-07 RX ADMIN — CARVEDILOL 6.25 MG: 6.25 TABLET, FILM COATED ORAL at 16:45

## 2021-08-07 RX ADMIN — SUCRALFATE 1 G: 1 TABLET ORAL at 19:37

## 2021-08-07 RX ADMIN — DIPHENHYDRAMINE HYDROCHLORIDE 50 MG: 50 INJECTION INTRAMUSCULAR; INTRAVENOUS at 08:48

## 2021-08-07 RX ADMIN — SODIUM CHLORIDE: 9 INJECTION, SOLUTION INTRAVENOUS at 04:03

## 2021-08-07 RX ADMIN — METHYLPREDNISOLONE SODIUM SUCCINATE 40 MG: 40 INJECTION, POWDER, FOR SOLUTION INTRAMUSCULAR; INTRAVENOUS at 23:13

## 2021-08-07 ASSESSMENT — PAIN SCALES - GENERAL: PAINLEVEL_OUTOF10: 0

## 2021-08-07 NOTE — PROGRESS NOTES
Cardiology Progress Note      Patient:  Jud Laguna  YOB: 1965  MRN: [id number]   Acct: [de-identified]  [facility] Date:  8/5/2021  Primary Cardiologist: Dr. Qiana Garcia  Seen by Dr. Leandra Neves    Per prior cardiology consult note-  CHIEF COMPLAINT: Dizziness     HPI: This is a pleasant 60 y.o. male with PMHx HTN and DM presents for orthostatic hypotension after PCI of LAD, left posterolateral branch and second obtuse marginal branch on 8/3/21. Pt states he went home and was unable to walk properly from the beginning. His wife noticed that he was wobbling and not able to walk straight. Then yesterday patient became diaphoretic, pale, dizzy and lost consciousness and passed out twice before EMS was called. He also describes visual changes, blurryness and seeing bright white lights right before fainting. Pt denies nausea or vomiting before losing consciousness. He states this is all new and started after the cath procedure.     Pt reports before the cath procedure he had uncontrolled HTN for which his medications were changed around.  Per chart review, pt's atenolol was stopped and coreg was started; procardia dosage was increased.      ECHO (8/21) 55-60% with trace MR, EKG NSR, left 2nd, 3rd and 4th toes previously amputated      Subjective (Events in last 24 hours):     Pt in bed - he c/o feeling itchy - he has a rash all over his body - worse in the past day -- making me feel it is BOONE rash     He also c/o pressure in MS chest - with laying down - he appears comfortable     Ortho vitals WNL this am     Pt without SOB       Objective:   BP (!) 150/71   Pulse 81   Temp 97.5 °F (36.4 °C) (Oral)   Resp 18   Ht 5' 10\" (1.778 m)   Wt 237 lb 3.2 oz (107.6 kg)   SpO2 99%   BMI 34.03 kg/m²        TELEMETRY: SR HR 80's    Physical Exam:  General Appearance: alert and oriented to person, place and time, in no acute distress  Cardiovascular: normal rate, regular rhythm, normal S1 and S2, no murmurs, rubs, clicks, or gallops, distal pulses intact,  Pulmonary/Chest: clear to auscultation bilaterally- no wheezes, rales or rhonchi, normal air movement, no respiratory distress  Abdomen: soft, non-tender, non-distended, normal bowel sounds, no masses Extremities: no cyanosis, clubbing or edema, pulses present    Skin: warm and dry, no rash or erythema  Musculoskeletal: normal range of motion, no joint swelling, deformity or tenderness  Neurological: alert, oriented, normal speech, no focal findings or movement disorder noted    Medications:    famotidine  20 mg Oral BID    insulin lispro  0-6 Units Subcutaneous TID WC    insulin lispro  0-3 Units Subcutaneous Nightly    carvedilol  6.25 mg Oral BID WC    sodium chloride  1,000 mL Intravenous Once    sodium chloride flush  5-40 mL Intravenous 2 times per day    enoxaparin  40 mg Subcutaneous Q24H    aspirin  81 mg Oral Daily    atorvastatin  80 mg Oral Nightly    glipiZIDE  5 mg Oral QAM AC    sucralfate  1 g Oral 4x Daily AC & HS    ticagrelor  90 mg Oral BID      dextrose      sodium chloride 100 mL/hr at 21 0403    sodium chloride      sodium chloride 100 mL/hr at 21 0602     glucose, 15 g, PRN  dextrose, 12.5 g, PRN  glucagon (rDNA), 1 mg, PRN  dextrose, 100 mL/hr, PRN  hydrOXYzine, 25 mg, TID PRN  sodium chloride flush, 5-40 mL, PRN  sodium chloride, 25 mL, PRN  ondansetron, 4 mg, Q8H PRN   Or  ondansetron, 4 mg, Q6H PRN  polyethylene glycol, 17 g, Daily PRN  acetaminophen, 650 mg, Q6H PRN   Or  acetaminophen, 650 mg, Q6H PRN  famotidine, 20 mg, BID PRN        Diagnostics:  EK-AUG-2021 13:44:09 [facility] ROUTINE RETRIEVAL Normal sinus rhythm Nonspecific T wave abnormality Abnormal ECG When compared with ECG of 03-AUG-2021 06:12, Nonspecific T wave abnormality is worse in Inferior leads Nonspecific T wave abnormality now evident in Lateral leads Confirmed by Shannon Garcia () on 2021 2:56:27 PM      Echo:  ----------------------------------------------------------------   Electronically signed by Nevin Hess MD (Interpreting  [phone]) on 08/02/2021 at 04:30 PM   ----------------------------------------------------------------      Findings      Mitral Valve   The mitral valve structure was normal with normal leaflet separation.   DOPPLER: The transmitral velocity was within the normal range with no   evidence for mitral stenosis. There was trace mitral regurgitation.      Aortic Valve   The aortic valve was trileaflet with normal thickness and cuspal   separation. DOPPLER: Transaortic velocity was within the normal range with   no evidence of aortic stenosis. There was no evidence of aortic   regurgitation.      Tricuspid Valve   The tricuspid valve structure was normal with normal leaflet separation.   DOPPLER: There was no evidence of tricuspid stenosis. There was no   evidence of tricuspid regurgitation.      Pulmonic Valve   The pulmonic valve leaflets were not well seen. DOPPLER: The transpulmonic   velocity was within the normal range with no evidence for regurgitation.      Left Atrium   Left atrial size was normal.      Left Ventricle   Normal left ventricular size and systolic function.   There were no regional wall motion abnormalities.   Wall thickness was within normal limits.   Ejection fraction was estimated at 55-60%.       Right Atrium   Right atrial size was normal.      Right Ventricle   The right ventricular size was normal with normal systolic function and   wall thickness.      Pericardial Effusion   The pericardium was normal in appearance with no evidence of a pericardial   effusion.      Pleural Effusion   No evidence of pleural effusion.      Aorta / Great Vessels   IVC size is within normal limits with normal respiratory phasic changes        Left Heart Cath:   FINDINGS:  HEMODYNAMICS:  Left ventricular end-diastolic pressure 5 mmHg.  No  significant pressure gradient across the aortic valve upon pullback.     LEFT VENTRICULOGRAPHY:  There is no evidence for any regional wall  motion abnormality.  Ejection fraction estimated at 55%.     CORONARY ANGIOGRAM:  1.  DOMINANCE:  Codominant system. 2.  RIGHT CORONARY ARTERY:  Proximal RCA has luminal irregularities. Mid RCA has 70% to 80% segmental lesion.  Distal RCA has mild luminal  irregularities. 3.  LEFT MAIN CORONARY ARTERY:  Distal left main coronary artery has  nonobstructive 10% to 30% stenosis.  It bifurcates into left circumflex  and left anterior descending arteries. 3.  LEFT CIRCUMFLEX ARTERY:  Ostial left circumflex artery has 10% to  20% stenosis. Joann Collar is a very small first obtuse marginal branch with  mild diffuse disease.  Second obtuse marginal branch is a relatively  larger vessel with subtotal 99% stenosis.  The left posterolateral  branch has 90% to 95% stenotic lesion. 4.  LEFT ANTERIOR DESCENDING ARTERY:  Proximal LAD is patent.  Mid LAD  has an 80% lesion.  Distal LAD has segmental 30% to 50% stenotic  Lesions.              PROCEDURES PERFORMED:  1.  Left cardiac catheterization with selective coronary angiogram.  2.  Successful PCI and stenting of the second obtuse marginal branch. 3.  Successful PCI and stenting of the left posterolateral branch. 4.  Successful PCI and stenting of the left anterior descending artery.     FINDINGS:  CORONARY ANGIOGRAM:  Dominance:  Codominant system. 1.  RIGHT CORONARY ARTERY:  Not injected.  Known to have severe stenosis  based on angiogram yesterday. 2.  LEFT MAIN CORONARY ARTERY:  10% to 20% stenosis in the distal  segment of the left main coronary artery. 3.  LEFT CIRCUMFLEX ARTERY:  Mild nonobstructive 10% to 20% ostial  stenosis.  First obtuse marginal branch is rather small vessel with mild  diffuse disease.  Second obtuse marginal branch is a large vessel with  subtotal 99% stenosis.  The left posterolateral branch has a 90% to 95%  stenotic lesion.   4.  LEFT ANTERIOR DESCENDING ARTERY:  Proximal to mid LAD has 80%  stenosis followed by segmental lesions ranging in severity between the  50% and 70% in the mid segment.  Distal LAD was with 30% to 50%  segmental stenosis     IMPRESSION:  1.  Subtotal occlusion, 99%, of the second obtuse marginal branch,  status post successful PCI and stenting. 2.  90% to 95% stenosis of the left posterolateral branch, status post  successful PCI and stenting. 3.  80% stenosis of the left anterior descending artery, status post  successful PCI and stenting. 4.  Acute coronary syndrome/unstable angina. 5.  Uncontrolled hypertension. 6.  Residual severe RCA stenosis.     RECOMMENDATIONS:  1.  Bedrest for the next four hours. 2.  Monitor on telemetry. 3.  Optimize medical therapy for coronary artery disease. 4.  Aggressive risk factor modification. 5.  Access site care. 6.  Aspirin 81 mg p.o. daily. 7.  Stop Plavix. 8.  The patient received loading dose of Brilinta. 9.  Continue on Brilinta 90 mg p.o. b.i.d. 10.  High intensity statin therapy.  Continue Lipitor 80 mg p.o. daily. 11.  Normal saline at 125 mL/hour. 12.  Check CBC and BMP in the morning. 13.  Staged PCI of RCA to be planned as outpatient. 14.  Blood pressure remains uncontrolled. 15.  The patient continues to be on nitroglycerin drip. 12.  Adjust the p.o. blood pressure medications as needed to achieve  better blood pressure control and then titrate nitroglycerin off slowly. 17.  We will stop atenolol. 18.  Start Coreg 12.5 mg p.o. b.i.d. 19.  Continue losartan 100 mg p.o. daily. 20.  Increase Procardia XL to 60 mg p.o. daily. 21.  The patient will need to monitor blood pressure at home. 25.  May discharge the patient home tomorrow if he feels well and blood  pressure is controlled. 23.  Consult Cardiac rehabilitation.     Findings and plan of care were discussed with the patient and family.    They are agreeable to the plan.           Josph Meigs, MD   D: 08/03/2021         Lab Data:    Cardiac Enzymes:  No results for input(s): CKTOTAL, CKMB, CKMBINDEX, TROPONINI in the last 72 hours. CBC:   Lab Results   Component Value Date    WBC 13.2 08/07/2021    RBC 4.36 08/07/2021    HGB 13.4 08/07/2021    HCT 40.0 08/07/2021     08/07/2021       CMP:    Lab Results   Component Value Date     08/07/2021    K 3.7 08/07/2021    K 3.5 08/06/2021     08/07/2021    CO2 17 08/07/2021    BUN 22 08/07/2021    CREATININE 0.9 08/07/2021    LABGLOM 87 08/07/2021    GLUCOSE 180 08/07/2021    CALCIUM 8.3 08/07/2021       Hepatic Function Panel:    Lab Results   Component Value Date    ALKPHOS 103 07/29/2021    ALT 10 07/29/2021    AST 10 07/29/2021    PROT 6.5 07/29/2021    BILITOT 0.4 07/29/2021    BILIDIR <0.2 07/29/2021    LABALBU 4.1 07/29/2021       Magnesium:    Lab Results   Component Value Date    MG 1.6 08/07/2021       PT/INR:    Lab Results   Component Value Date    INR 0.98 08/03/2021       HgBA1c:    Lab Results   Component Value Date    LABA1C 6.0 08/02/2021       FLP:    Lab Results   Component Value Date    TRIG 115 08/04/2021    HDL 37 08/04/2021    LDLCALC 59 08/04/2021       TSH:    Lab Results   Component Value Date    TSH 1.550 08/05/2021         Assessment:  Rash - likely BOONE rash - remove all offending agents   monitor      OH-- resolved    Secondary to medications changes and BOONE    BOONE - improved       Recent [place] : S\p cardiac cath 8/2/2021: MV CAD - pt refused CABG      Sp cardiac cath  8/3/202:  1.  Subtotal occlusion, 99%, of the second obtuse marginal branch,  status post successful PCI and stenting. 2.  90% to 95% stenosis of the left posterolateral branch, status post  successful PCI and stenting.   3.  80% stenosis of the left anterior descending artery, status post  successful PCI and stenting.              --Staged PCI of RCA to be planned as outpatient.        Normal EF    HTN   HLP  LDL 59  DM II  A1C 6.0     Hx CVA      Plan:  · Ortho WNL   · Restart BB - ambulate today   · Follow          Electronically signed by GLORIA Mcnair CNP on 8/7/2021 at 9:05 AM

## 2021-08-07 NOTE — PROGRESS NOTES
Notified Dr. Jayden Herr that patient is having increased rash and hives over top half of body along with itching. Face, neck, and ears swollen and red.

## 2021-08-07 NOTE — PROGRESS NOTES
Hospitalist Progress Note    Patient:  Siloam Shock      Unit/Bed:3B-37/037-A    YOB: 1965    MRN: [id number]       Acct: [de-identified]     PCP: Brady Crocker MD    Date of Admission: 8/5/2021    Assessment/Plan:    Anticipated Discharge in :     BRUNILDA/Nathaniel Aguilar 1106 Problems    Diagnosis Date Noted    Orthostatic hypotension [I95.1] 08/06/2021    Acute kidney injury [facility]) [N17.9] 08/05/2021     Syncope secondary to orthostatic hypotension  Likely due to medications vs drug reaction  Hold losartan due to BOONE  Hold nifedipine  Decrease carvedilol dose to 6.25mg, hold with parameters  orthostatic vitals still positive  Cardiology consult for medication adjustments  Continue IVF for now    Morbilliform drug reaction  Hypersensitivity to Brilinta?   See pictures below  New medications recently introduced, carvedilol, lipitor and brilinta (used to be on plavix)  Obtain CBC with differentials  Give Solumedrol and Diphenhydramine x1  Continue Pepcid  Consider switching per cardiology recommendations, may need to load again    MV CAD s/p PCI 8/3  Has stent on obtuse marginal branch, left posterolateral branch and left anterior descending artery   Will need staged PCI RCA as outpatient  Continue aspirin, statin and brilinta     Elevated troponins  Likely due to recent cath and BOONE  No chest pain     Leukocytosis   Likely reactive     Essential HTN, now with hypotension  See treatment in #1     Acute Kindey Injury, resolved  Due to hypotension, contrast induced and ARB   Hold losartan and metformin     NAG Metabolic acidosis, improving  Likely due to BOONE  BMP in AM     DM type II  Hold metformin  Ok to resume glimepiride     Obesity  Diet and lifestyle changes     Hx of CVA    Disposition  Likely to be discharged tomorrow  Follow-up with primary physician/cardio as outpatient once discharged      Chief Complaint:  syncope    Hospital Course:  60 y.o. male, with a PMH MV CAD with recent PCI of obtuse sodium chloride flush, sodium chloride, ondansetron **OR** ondansetron, polyethylene glycol, acetaminophen **OR** acetaminophen, famotidine      Intake/Output Summary (Last 24 hours) at 8/7/2021 0902  Last data filed at 8/7/2021 0335  Gross per 24 hour   Intake 3148.22 ml   Output 795 ml   Net 2353.22 ml       Diet:  ADULT DIET; Regular; Low Fat/Low Chol/High Fiber/NERY    Exam:  BP (!) 150/71   Pulse 81   Temp 97.5 °F (36.4 °C) (Oral)   Resp 18   Ht 5' 10\" (1.778 m)   Wt 237 lb 3.2 oz (107.6 kg)   SpO2 99%   BMI 34.03 kg/m²     General appearance: No apparent distress, appears stated age and cooperative. HEENT: Pupils equal, round, and reactive to light. Conjunctivae/corneas clear. Neck: Supple, with full range of motion. No jugular venous distention. Trachea midline. Respiratory:  Normal respiratory effort. Clear to auscultation, bilaterally without Rales/Wheezes/Rhonchi. Cardiovascular: Regular rate and rhythm with normal S1/S2 without murmurs, rubs or gallops. Abdomen: Soft, non-tender, non-distended with normal bowel sounds. Musculoskeletal: passive and active ROM x 4 extremities. Skin: diffused erythematous maculopapular rashes and patches on the trunk (mostly back), hips, neck and face  Neurologic:  Neurovascularly intact without any focal sensory/motor deficits.  Cranial nerves: II-XII intact, grossly non-focal.  Psychiatric: Alert and oriented, thought content appropriate, normal insight  Capillary Refill: Brisk,< 3 seconds   Peripheral Pulses: +2 palpable, equal bilaterally           Labs:   Recent Labs     08/05/21  1533 08/06/21  0334 08/07/21  0404   WBC 14.6* 13.1* 13.2*   HGB 14.4 13.1* 13.4*   HCT 41.3* 39.3* 40.0*    217 221     Recent Labs     08/05/21  1533 08/06/21  0334 08/07/21  0404    139 139   K 4.2 3.5 3.7    109 110   CO2 17* 19* 17*   BUN 42* 34* 22   CREATININE 1.4* 1.0 0.9   CALCIUM 8.3* 8.2* 8.3*     No results for input(s): AST, ALT, BILIDIR, BILITOT, ALKPHOS in the last 72 hours. No results for input(s): INR in the last 72 hours. No results for input(s): Billie Raudel in the last 72 hours. Urinalysis:    No results found for: Kaley Dose, BACTERIA, RBCUA, BLOODU, [address], Leonie [place] [address]    Radiology:  CT Head WO Contrast   Final Result      1. No acute intracranial hemorrhage, mass effect or midline shift. 2. Paranasal sinus disease. **This report has been created using voice recognition software. It may contain minor errors which are inherent in voice recognition technology. **      Final report electronically signed by Dr Vanessa Fox on 8/5/2021 3:33 PM      XR CHEST PORTABLE   Final Result   There is no acute intrathoracic process. **This report has been created using voice recognition software. It may contain minor errors which are inherent in voice recognition technology. **      Final report electronically signed by Dr Vanessa Fox on 8/5/2021 2:40 PM          Diet: ADULT DIET;  Regular; Low Fat/Low Chol/High Fiber/NERY    DVT prophylaxis: [] Lovenox                                 [] SCDs                                 [] SQ Heparin                                 [] Encourage ambulation           [] Already on Anticoagulation     Disposition:    [] Home       [] TCU       [] Rehab       [] Psych       [] SNF       [] [address]       [] Other-    Code Status: Full Code    PT/OT Eval Status:         Electronically signed by Lacie Oppenheim, MD on 8/7/2021 at 9:02 AM

## 2021-08-08 LAB
ANION GAP SERPL CALCULATED.3IONS-SCNC: 13 MEQ/L (ref 8–16)
BASOPHILS # BLD: 0.2 %
BASOPHILS ABSOLUTE: 0.1 THOU/MM3 (ref 0–0.1)
BUN BLDV-MCNC: 21 MG/DL (ref 7–22)
CALCIUM SERPL-MCNC: 9.3 MG/DL (ref 8.5–10.5)
CHLORIDE BLD-SCNC: 106 MEQ/L (ref 98–111)
CO2: 19 MEQ/L (ref 23–33)
CREAT SERPL-MCNC: 0.9 MG/DL (ref 0.4–1.2)
DIFFERENTIAL TYPE: ABNORMAL
EKG ATRIAL RATE: 81 BPM
EKG P AXIS: 76 DEGREES
EKG P-R INTERVAL: 172 MS
EKG Q-T INTERVAL: 374 MS
EKG QRS DURATION: 96 MS
EKG QTC CALCULATION (BAZETT): 434 MS
EKG R AXIS: 78 DEGREES
EKG T AXIS: 21 DEGREES
EKG VENTRICULAR RATE: 81 BPM
EOSINOPHIL # BLD: 0.4 %
EOSINOPHILS ABSOLUTE: 0.1 THOU/MM3 (ref 0–0.4)
ERYTHROCYTE [DISTWIDTH] IN BLOOD BY AUTOMATED COUNT: 13.4 % (ref 11.5–14.5)
ERYTHROCYTE [DISTWIDTH] IN BLOOD BY AUTOMATED COUNT: 44.7 FL (ref 35–45)
GFR SERPL CREATININE-BSD FRML MDRD: 87 ML/MIN/1.73M2
GLUCOSE BLD-MCNC: 223 MG/DL (ref 70–108)
HCT VFR BLD CALC: 44.5 % (ref 42–52)
HEMOGLOBIN: 15.1 GM/DL (ref 14–18)
IMMATURE GRANS (ABS): 0.68 THOU/MM3 (ref 0–0.07)
IMMATURE GRANULOCYTES: 2.2 %
LD: 184 U/L (ref 100–190)
LYMPHOCYTES # BLD: 2.4 %
LYMPHOCYTES ABSOLUTE: 0.7 THOU/MM3 (ref 1–4.8)
MCH RBC QN AUTO: 30.9 PG (ref 26–33)
MCHC RBC AUTO-ENTMCNC: 33.9 GM/DL (ref 32.2–35.5)
MCV RBC AUTO: 91.2 FL (ref 80–94)
MONOCYTES # BLD: 1.9 %
MONOCYTES ABSOLUTE: 0.6 THOU/MM3 (ref 0.4–1.3)
NUCLEATED RED BLOOD CELLS: 0 /100 WBC
PATHOLOGIST REVIEW: ABNORMAL
PLATELET # BLD: 288 THOU/MM3 (ref 130–400)
PLATELET ESTIMATE: ADEQUATE
PMV BLD AUTO: 10 FL (ref 9.4–12.4)
POTASSIUM SERPL-SCNC: 3.9 MEQ/L (ref 3.5–5.2)
RBC # BLD: 4.88 MILL/MM3 (ref 4.7–6.1)
SCAN OF BLOOD SMEAR: NORMAL
SEG NEUTROPHILS: 92.9 %
SEGMENTED NEUTROPHILS ABSOLUTE COUNT: 28.6 THOU/MM3 (ref 1.8–7.7)
SODIUM BLD-SCNC: 138 MEQ/L (ref 135–145)
WBC # BLD: 30.8 THOU/MM3 (ref 4.8–10.8)

## 2021-08-08 PROCEDURE — G0378 HOSPITAL OBSERVATION PER HR: HCPCS

## 2021-08-08 PROCEDURE — 80048 BASIC METABOLIC PNL TOTAL CA: CPT

## 2021-08-08 PROCEDURE — 83615 LACTATE (LD) (LDH) ENZYME: CPT

## 2021-08-08 PROCEDURE — 85025 COMPLETE CBC W/AUTO DIFF WBC: CPT

## 2021-08-08 PROCEDURE — 6360000002 HC RX W HCPCS: Performed by: FAMILY MEDICINE

## 2021-08-08 PROCEDURE — 36415 COLL VENOUS BLD VENIPUNCTURE: CPT

## 2021-08-08 PROCEDURE — 99233 SBSQ HOSP IP/OBS HIGH 50: CPT | Performed by: FAMILY MEDICINE

## 2021-08-08 PROCEDURE — 6360000002 HC RX W HCPCS: Performed by: INTERNAL MEDICINE

## 2021-08-08 PROCEDURE — 2580000003 HC RX 258: Performed by: FAMILY MEDICINE

## 2021-08-08 PROCEDURE — 96372 THER/PROPH/DIAG INJ SC/IM: CPT

## 2021-08-08 PROCEDURE — 2140000000 HC CCU INTERMEDIATE R&B

## 2021-08-08 PROCEDURE — 6370000000 HC RX 637 (ALT 250 FOR IP): Performed by: FAMILY MEDICINE

## 2021-08-08 PROCEDURE — 6370000000 HC RX 637 (ALT 250 FOR IP): Performed by: NURSE PRACTITIONER

## 2021-08-08 PROCEDURE — 96376 TX/PRO/DX INJ SAME DRUG ADON: CPT

## 2021-08-08 RX ORDER — METHYLPREDNISOLONE SODIUM SUCCINATE 40 MG/ML
40 INJECTION, POWDER, LYOPHILIZED, FOR SOLUTION INTRAMUSCULAR; INTRAVENOUS EVERY 12 HOURS
Status: DISCONTINUED | OUTPATIENT
Start: 2021-08-09 | End: 2021-08-10 | Stop reason: HOSPADM

## 2021-08-08 RX ORDER — LORAZEPAM 0.5 MG/1
0.5 TABLET ORAL NIGHTLY PRN
Status: DISCONTINUED | OUTPATIENT
Start: 2021-08-08 | End: 2021-08-10 | Stop reason: HOSPADM

## 2021-08-08 RX ORDER — DIPHENHYDRAMINE HCL 25 MG
25 TABLET ORAL EVERY 6 HOURS PRN
Status: DISCONTINUED | OUTPATIENT
Start: 2021-08-08 | End: 2021-08-10 | Stop reason: HOSPADM

## 2021-08-08 RX ORDER — DIPHENHYDRAMINE HYDROCHLORIDE 50 MG/ML
25 INJECTION INTRAMUSCULAR; INTRAVENOUS EVERY 6 HOURS PRN
Status: DISCONTINUED | OUTPATIENT
Start: 2021-08-08 | End: 2021-08-10 | Stop reason: HOSPADM

## 2021-08-08 RX ORDER — CARVEDILOL 6.25 MG/1
12.5 TABLET ORAL 2 TIMES DAILY WITH MEALS
Status: DISCONTINUED | OUTPATIENT
Start: 2021-08-08 | End: 2021-08-09

## 2021-08-08 RX ORDER — NIFEDIPINE 30 MG/1
30 TABLET, EXTENDED RELEASE ORAL DAILY
Status: DISCONTINUED | OUTPATIENT
Start: 2021-08-08 | End: 2021-08-10 | Stop reason: HOSPADM

## 2021-08-08 RX ORDER — METOPROLOL TARTRATE 50 MG/1
50 TABLET, FILM COATED ORAL 2 TIMES DAILY
Status: DISCONTINUED | OUTPATIENT
Start: 2021-08-08 | End: 2021-08-08

## 2021-08-08 RX ADMIN — GLIPIZIDE 5 MG: 5 TABLET ORAL at 06:52

## 2021-08-08 RX ADMIN — ATORVASTATIN CALCIUM 80 MG: 80 TABLET, FILM COATED ORAL at 21:49

## 2021-08-08 RX ADMIN — DIPHENHYDRAMINE HCL 25 MG: 25 TABLET ORAL at 15:11

## 2021-08-08 RX ADMIN — SUCRALFATE 1 G: 1 TABLET ORAL at 10:02

## 2021-08-08 RX ADMIN — SUCRALFATE 1 G: 1 TABLET ORAL at 16:19

## 2021-08-08 RX ADMIN — LORAZEPAM 0.5 MG: 0.5 TABLET ORAL at 21:46

## 2021-08-08 RX ADMIN — SODIUM CHLORIDE, PRESERVATIVE FREE 10 ML: 5 INJECTION INTRAVENOUS at 10:03

## 2021-08-08 RX ADMIN — SODIUM CHLORIDE, PRESERVATIVE FREE 10 ML: 5 INJECTION INTRAVENOUS at 21:52

## 2021-08-08 RX ADMIN — ENOXAPARIN SODIUM 40 MG: 40 INJECTION SUBCUTANEOUS at 21:46

## 2021-08-08 RX ADMIN — FAMOTIDINE 20 MG: 20 TABLET, FILM COATED ORAL at 10:02

## 2021-08-08 RX ADMIN — CARVEDILOL 12.5 MG: 6.25 TABLET, FILM COATED ORAL at 16:19

## 2021-08-08 RX ADMIN — ASPIRIN 81 MG: 81 TABLET, COATED ORAL at 10:03

## 2021-08-08 RX ADMIN — METHYLPREDNISOLONE SODIUM SUCCINATE 40 MG: 40 INJECTION, POWDER, FOR SOLUTION INTRAMUSCULAR; INTRAVENOUS at 16:19

## 2021-08-08 RX ADMIN — FAMOTIDINE 20 MG: 20 TABLET, FILM COATED ORAL at 21:46

## 2021-08-08 RX ADMIN — SUCRALFATE 1 G: 1 TABLET ORAL at 21:46

## 2021-08-08 RX ADMIN — NIFEDIPINE 30 MG: 30 TABLET, FILM COATED, EXTENDED RELEASE ORAL at 10:02

## 2021-08-08 RX ADMIN — CARVEDILOL 12.5 MG: 6.25 TABLET, FILM COATED ORAL at 10:03

## 2021-08-08 RX ADMIN — SUCRALFATE 1 G: 1 TABLET ORAL at 06:52

## 2021-08-08 RX ADMIN — DIPHENHYDRAMINE HYDROCHLORIDE 25 MG: 50 INJECTION INTRAMUSCULAR; INTRAVENOUS at 21:46

## 2021-08-08 RX ADMIN — CLOPIDOGREL BISULFATE 75 MG: 75 TABLET ORAL at 10:03

## 2021-08-08 RX ADMIN — HYDROXYZINE HYDROCHLORIDE 25 MG: 25 TABLET ORAL at 10:07

## 2021-08-08 RX ADMIN — METHYLPREDNISOLONE SODIUM SUCCINATE 40 MG: 40 INJECTION, POWDER, FOR SOLUTION INTRAMUSCULAR; INTRAVENOUS at 10:02

## 2021-08-08 ASSESSMENT — PAIN SCALES - GENERAL: PAINLEVEL_OUTOF10: 0

## 2021-08-08 NOTE — PROGRESS NOTES
Cardiology Progress Note      Patient:  Brook Rosen  YOB: 1965  MRN: [id number]   Acct: [de-identified]  [facility] Date:  8/5/2021  Primary Cardiologist: Dr. Dipika Teran  Seen by Dr. Linda Sebastian    Per prior cardiology consult note-  CHIEF COMPLAINT: Dizziness     HPI: This is a pleasant 60 y.o. male with PMHx HTN and DM presents for orthostatic hypotension after PCI of LAD, left posterolateral branch and second obtuse marginal branch on 8/3/21. Pt states he went home and was unable to walk properly from the beginning. His wife noticed that he was wobbling and not able to walk straight. Then yesterday patient became diaphoretic, pale, dizzy and lost consciousness and passed out twice before EMS was called. He also describes visual changes, blurryness and seeing bright white lights right before fainting. Pt denies nausea or vomiting before losing consciousness. He states this is all new and started after the cath procedure.     Pt reports before the cath procedure he had uncontrolled HTN for which his medications were changed around.  Per chart review, pt's atenolol was stopped and coreg was started; procardia dosage was increased.      ECHO (8/21) 55-60% with trace MR, EKG NSR, left 2nd, 3rd and 4th toes previously amputated      Subjective (Events in last 24 hours):     Pt in bed - he c/o feeling itchy - he has a rash all over his body - worse in the past day -- making me feel it is BOONE rash     He also c/o pressure in MS chest - with laying down - he appears comfortable     Ortho vitals WNL this am     Pt without SOB       8/8/2021  Pt up in room - he c/o severe back itching last nadine - no dizziness or CP or SOB    bp up this am  Tele SR no ectopy        Objective:   BP (!) 188/90   Pulse 87   Temp 97.6 °F (36.4 °C) (Oral)   Resp 17   Ht 5' 10\" (1.778 m)   Wt 238 lb 4.8 oz (108.1 kg)   SpO2 98%   BMI 34.19 kg/m²        TELEMETRY: SR HR 80's    Physical Exam:  General Appearance: alert and oriented to person, place and time, in no acute distress  Cardiovascular: normal rate, regular rhythm, normal S1 and S2, no murmurs, rubs, clicks, or gallops, distal pulses intact,  Pulmonary/Chest: clear to auscultation bilaterally- no wheezes, rales or rhonchi, normal air movement, no respiratory distress  Abdomen: soft, non-tender, non-distended, normal bowel sounds, no masses Extremities: no cyanosis, clubbing or edema, pulses present    Skin: warm and dry, no rash or erythema  Musculoskeletal: normal range of motion, no joint swelling, deformity or tenderness  Neurological: alert, oriented, normal speech, no focal findings or movement disorder noted    Medications:    metoprolol tartrate  50 mg Oral BID    famotidine  20 mg Oral BID    insulin lispro  0-6 Units Subcutaneous TID WC    insulin lispro  0-3 Units Subcutaneous Nightly    clopidogrel  75 mg Oral Daily    methylPREDNISolone  40 mg Intravenous Q8H    sodium chloride  1,000 mL Intravenous Once    sodium chloride flush  5-40 mL Intravenous 2 times per day    enoxaparin  40 mg Subcutaneous Q24H    aspirin  81 mg Oral Daily    atorvastatin  80 mg Oral Nightly    glipiZIDE  5 mg Oral QAM AC    sucralfate  1 g Oral 4x Daily AC & HS      dextrose      sodium chloride       glucose, 15 g, PRN  dextrose, 12.5 g, PRN  glucagon (rDNA), 1 mg, PRN  dextrose, 100 mL/hr, PRN  diphenhydrAMINE, 25 mg, Q6H PRN  hydrOXYzine, 25 mg, TID PRN  sodium chloride flush, 5-40 mL, PRN  sodium chloride, 25 mL, PRN  ondansetron, 4 mg, Q8H PRN   Or  ondansetron, 4 mg, Q6H PRN  polyethylene glycol, 17 g, Daily PRN  acetaminophen, 650 mg, Q6H PRN   Or  acetaminophen, 650 mg, Q6H PRN  famotidine, 20 mg, BID PRN        Diagnostics:  EK-AUG-2021 13:44:09 [facility] ROUTINE RETRIEVAL Normal sinus rhythm Nonspecific T wave abnormality Abnormal ECG When compared with ECG of 03-AUG-2021 06:12, Nonspecific T wave abnormality is worse in Inferior leads Nonspecific T wave abnormality now evident in Lateral leads Confirmed by Shannon Garcia ([id number]) on 8/6/2021 2:56:27 PM      Echo:    ----------------------------------------------------------------   Electronically signed by Cherylene Boys MD (Interpreting   physician) on 08/02/2021 at 04:30 PM   ----------------------------------------------------------------      Findings      Mitral Valve   The mitral valve structure was normal with normal leaflet separation.   DOPPLER: The transmitral velocity was within the normal range with no   evidence for mitral stenosis. There was trace mitral regurgitation.      Aortic Valve   The aortic valve was trileaflet with normal thickness and cuspal   separation. DOPPLER: Transaortic velocity was within the normal range with   no evidence of aortic stenosis. There was no evidence of aortic   regurgitation.      Tricuspid Valve   The tricuspid valve structure was normal with normal leaflet separation.   DOPPLER: There was no evidence of tricuspid stenosis. There was no   evidence of tricuspid regurgitation.      Pulmonic Valve   The pulmonic valve leaflets were not well seen. DOPPLER: The transpulmonic   velocity was within the normal range with no evidence for regurgitation.      Left Atrium   Left atrial size was normal.      Left Ventricle   Normal left ventricular size and systolic function.   There were no regional wall motion abnormalities.   Wall thickness was within normal limits.   Ejection fraction was estimated at 55-60%.       Right Atrium   Right atrial size was normal.      Right Ventricle   The right ventricular size was normal with normal systolic function and   wall thickness.      Pericardial Effusion   The pericardium was normal in appearance with no evidence of a pericardial   effusion.      Pleural Effusion   No evidence of pleural effusion.      Aorta / Great Vessels   IVC size is within normal limits with normal respiratory phasic changes        Left Heart Cath:   FINDINGS:  HEMODYNAMICS:  Left ventricular end-diastolic pressure 5 mmHg.  No  significant pressure gradient across the aortic valve upon pullback.     LEFT VENTRICULOGRAPHY:  There is no evidence for any regional wall  motion abnormality.  Ejection fraction estimated at 55%.     CORONARY ANGIOGRAM:  1.  DOMINANCE:  Codominant system. 2.  RIGHT CORONARY ARTERY:  Proximal RCA has luminal irregularities. Mid RCA has 70% to 80% segmental lesion.  Distal RCA has mild luminal  irregularities. 3.  LEFT MAIN CORONARY ARTERY:  Distal left main coronary artery has  nonobstructive 10% to 30% stenosis.  It bifurcates into left circumflex  and left anterior descending arteries. 3.  LEFT CIRCUMFLEX ARTERY:  Ostial left circumflex artery has 10% to  20% stenosis. Terrell Spatz is a very small first obtuse marginal branch with  mild diffuse disease.  Second obtuse marginal branch is a relatively  larger vessel with subtotal 99% stenosis.  The left posterolateral  branch has 90% to 95% stenotic lesion. 4.  LEFT ANTERIOR DESCENDING ARTERY:  Proximal LAD is patent.  Mid LAD  has an 80% lesion.  Distal LAD has segmental 30% to 50% stenotic  Lesions.              PROCEDURES PERFORMED:  1.  Left cardiac catheterization with selective coronary angiogram.  2.  Successful PCI and stenting of the second obtuse marginal branch. 3.  Successful PCI and stenting of the left posterolateral branch. 4.  Successful PCI and stenting of the left anterior descending artery.     FINDINGS:  CORONARY ANGIOGRAM:  Dominance:  Codominant system. 1.  RIGHT CORONARY ARTERY:  Not injected.  Known to have severe stenosis  based on angiogram yesterday. 2.  LEFT MAIN CORONARY ARTERY:  10% to 20% stenosis in the distal  segment of the left main coronary artery.   3.  LEFT CIRCUMFLEX ARTERY:  Mild nonobstructive 10% to 20% ostial  stenosis.  First obtuse marginal branch is rather small vessel with mild  diffuse disease.  Second obtuse marginal branch is a large vessel with  subtotal 99% stenosis.  The left posterolateral branch has a 90% to 95%  stenotic lesion. 4.  LEFT ANTERIOR DESCENDING ARTERY:  Proximal to mid LAD has 80%  stenosis followed by segmental lesions ranging in severity between the  50% and 70% in the mid segment.  Distal LAD was with 30% to 50%  segmental stenosis     IMPRESSION:  1.  Subtotal occlusion, 99%, of the second obtuse marginal branch,  status post successful PCI and stenting. 2.  90% to 95% stenosis of the left posterolateral branch, status post  successful PCI and stenting. 3.  80% stenosis of the left anterior descending artery, status post  successful PCI and stenting. 4.  Acute coronary syndrome/unstable angina. 5.  Uncontrolled hypertension. 6.  Residual severe RCA stenosis.     RECOMMENDATIONS:  1.  Bedrest for the next four hours. 2.  Monitor on telemetry. 3.  Optimize medical therapy for coronary artery disease. 4.  Aggressive risk factor modification. 5.  Access site care. 6.  Aspirin 81 mg p.o. daily. 7.  Stop Plavix. 8.  The patient received loading dose of Brilinta. 9.  Continue on Brilinta 90 mg p.o. b.i.d. 10.  High intensity statin therapy.  Continue Lipitor 80 mg p.o. daily. 11.  Normal saline at 125 mL/hour. 12.  Check CBC and BMP in the morning. 13.  Staged PCI of RCA to be planned as outpatient. 14.  Blood pressure remains uncontrolled. 15.  The patient continues to be on nitroglycerin drip. 12.  Adjust the p.o. blood pressure medications as needed to achieve  better blood pressure control and then titrate nitroglycerin off slowly. 17.  We will stop atenolol. 18.  Start Coreg 12.5 mg p.o. b.i.d. 19.  Continue losartan 100 mg p.o. daily. 20.  Increase Procardia XL to 60 mg p.o. daily. 21.  The patient will need to monitor blood pressure at home. 25.  May discharge the patient home tomorrow if he feels well and blood  pressure is controlled.   23.  Consult Cardiac rehabilitation.     Findings and plan of care were discussed with the patient and family. They are agreeable to the plan.           Jeff Flynn MD   D: 08/03/2021         Lab Data:    Cardiac Enzymes:  No results for input(s): CKTOTAL, CKMB, CKMBINDEX, TROPONINI in the last 72 hours. CBC:   Lab Results   Component Value Date    WBC 30.8 08/08/2021    RBC 4.88 08/08/2021    HGB 15.1 08/08/2021    HCT 44.5 08/08/2021     08/08/2021       CMP:    Lab Results   Component Value Date     08/08/2021    K 3.9 08/08/2021    K 3.5 08/06/2021     08/08/2021    CO2 19 08/08/2021    BUN 21 08/08/2021    CREATININE 0.9 08/08/2021    LABGLOM 87 08/08/2021    GLUCOSE 223 08/08/2021    CALCIUM 9.3 08/08/2021       Hepatic Function Panel:    Lab Results   Component Value Date    ALKPHOS 103 07/29/2021    ALT 10 07/29/2021    AST 10 07/29/2021    PROT 6.5 07/29/2021    BILITOT 0.4 07/29/2021    BILIDIR <0.2 07/29/2021    LABALBU 4.1 07/29/2021       Magnesium:    Lab Results   Component Value Date    MG 1.6 08/07/2021       PT/INR:    Lab Results   Component Value Date    INR 0.98 08/03/2021       HgBA1c:    Lab Results   Component Value Date    LABA1C 6.0 08/02/2021       FLP:    Lab Results   Component Value Date    TRIG 115 08/04/2021    HDL 37 08/04/2021    LDLCALC 59 08/04/2021       TSH:    Lab Results   Component Value Date    TSH 1.550 08/05/2021         Assessment:  Rash - likely BOONE rash - remove all offending agents -   monitor      OH-- resolved    Secondary to medications changes and BOONE    BOONE - improved       Recent [place] : S\p cardiac cath 8/2/2021: MV CAD - pt refused CABG      Sp cardiac cath  8/3/202:  1.  Subtotal occlusion, 99%, of the second obtuse marginal branch,  status post successful PCI and stenting. 2.  90% to 95% stenosis of the left posterolateral branch, status post  successful PCI and stenting.   3.  80% stenosis of the left anterior descending artery, status post  successful PCI and stenting.              --Staged PCI of RCA to be

## 2021-08-09 LAB
ALBUMIN SERPL-MCNC: 3.2 G/DL (ref 3.5–5.1)
ALP BLD-CCNC: 92 U/L (ref 38–126)
ALT SERPL-CCNC: 9 U/L (ref 11–66)
ANION GAP SERPL CALCULATED.3IONS-SCNC: 10 MEQ/L (ref 8–16)
AST SERPL-CCNC: 6 U/L (ref 5–40)
BACTERIA: ABNORMAL
BILIRUB SERPL-MCNC: 0.5 MG/DL (ref 0.3–1.2)
BILIRUBIN DIRECT: < 0.2 MG/DL (ref 0–0.3)
BILIRUBIN URINE: NEGATIVE
BLOOD, URINE: ABNORMAL
BUN BLDV-MCNC: 25 MG/DL (ref 7–22)
CALCIUM SERPL-MCNC: 8.9 MG/DL (ref 8.5–10.5)
CASTS: ABNORMAL /LPF
CASTS: ABNORMAL /LPF
CHARACTER, URINE: CLEAR
CHLORIDE BLD-SCNC: 106 MEQ/L (ref 98–111)
CO2: 23 MEQ/L (ref 23–33)
COLOR: YELLOW
CREAT SERPL-MCNC: 0.9 MG/DL (ref 0.4–1.2)
CRYSTALS: ABNORMAL
EPITHELIAL CELLS, UA: ABNORMAL /HPF
ERYTHROCYTE [DISTWIDTH] IN BLOOD BY AUTOMATED COUNT: 13.6 % (ref 11.5–14.5)
ERYTHROCYTE [DISTWIDTH] IN BLOOD BY AUTOMATED COUNT: 44.9 FL (ref 35–45)
GFR SERPL CREATININE-BSD FRML MDRD: 87 ML/MIN/1.73M2
GLUCOSE BLD-MCNC: 233 MG/DL (ref 70–108)
GLUCOSE BLD-MCNC: 235 MG/DL (ref 70–108)
GLUCOSE BLD-MCNC: 245 MG/DL (ref 70–108)
GLUCOSE BLD-MCNC: 271 MG/DL (ref 70–108)
GLUCOSE BLD-MCNC: 280 MG/DL (ref 70–108)
GLUCOSE, URINE: 500 MG/DL
HCT VFR BLD CALC: 37.5 % (ref 42–52)
HEMOGLOBIN: 12.8 GM/DL (ref 14–18)
KETONES, URINE: NEGATIVE
LEUKOCYTE ESTERASE, URINE: NEGATIVE
MCH RBC QN AUTO: 31.1 PG (ref 26–33)
MCHC RBC AUTO-ENTMCNC: 34.1 GM/DL (ref 32.2–35.5)
MCV RBC AUTO: 91.2 FL (ref 80–94)
MISCELLANEOUS LAB TEST RESULT: ABNORMAL
NITRITE, URINE: NEGATIVE
PH UA: 6 (ref 5–9)
PLATELET # BLD: 259 THOU/MM3 (ref 130–400)
PMV BLD AUTO: 10.2 FL (ref 9.4–12.4)
POTASSIUM SERPL-SCNC: 4.2 MEQ/L (ref 3.5–5.2)
PROTEIN UA: NEGATIVE MG/DL
RBC # BLD: 4.11 MILL/MM3 (ref 4.7–6.1)
RBC URINE: ABNORMAL /HPF
RENAL EPITHELIAL, UA: ABNORMAL
SODIUM BLD-SCNC: 139 MEQ/L (ref 135–145)
SPECIFIC GRAVITY UA: 1.02 (ref 1–1.03)
TOTAL PROTEIN: 5.6 G/DL (ref 6.1–8)
UROBILINOGEN, URINE: 1 EU/DL (ref 0–1)
WBC # BLD: 29.2 THOU/MM3 (ref 4.8–10.8)
WBC UA: ABNORMAL /HPF
YEAST: ABNORMAL

## 2021-08-09 PROCEDURE — 6370000000 HC RX 637 (ALT 250 FOR IP): Performed by: FAMILY MEDICINE

## 2021-08-09 PROCEDURE — 99232 SBSQ HOSP IP/OBS MODERATE 35: CPT | Performed by: FAMILY MEDICINE

## 2021-08-09 PROCEDURE — 6370000000 HC RX 637 (ALT 250 FOR IP): Performed by: NURSE PRACTITIONER

## 2021-08-09 PROCEDURE — 2140000000 HC CCU INTERMEDIATE R&B

## 2021-08-09 PROCEDURE — 2580000003 HC RX 258: Performed by: FAMILY MEDICINE

## 2021-08-09 PROCEDURE — 96376 TX/PRO/DX INJ SAME DRUG ADON: CPT

## 2021-08-09 PROCEDURE — 82948 REAGENT STRIP/BLOOD GLUCOSE: CPT

## 2021-08-09 PROCEDURE — 80053 COMPREHEN METABOLIC PANEL: CPT

## 2021-08-09 PROCEDURE — 96372 THER/PROPH/DIAG INJ SC/IM: CPT

## 2021-08-09 PROCEDURE — 81001 URINALYSIS AUTO W/SCOPE: CPT

## 2021-08-09 PROCEDURE — 36415 COLL VENOUS BLD VENIPUNCTURE: CPT

## 2021-08-09 PROCEDURE — 85027 COMPLETE CBC AUTOMATED: CPT

## 2021-08-09 PROCEDURE — 6360000002 HC RX W HCPCS: Performed by: FAMILY MEDICINE

## 2021-08-09 PROCEDURE — G0378 HOSPITAL OBSERVATION PER HR: HCPCS

## 2021-08-09 PROCEDURE — 82248 BILIRUBIN DIRECT: CPT

## 2021-08-09 RX ORDER — CARVEDILOL 25 MG/1
25 TABLET ORAL 2 TIMES DAILY WITH MEALS
Status: DISCONTINUED | OUTPATIENT
Start: 2021-08-09 | End: 2021-08-10 | Stop reason: HOSPADM

## 2021-08-09 RX ADMIN — DIPHENHYDRAMINE HYDROCHLORIDE 25 MG: 50 INJECTION INTRAMUSCULAR; INTRAVENOUS at 17:39

## 2021-08-09 RX ADMIN — DIPHENHYDRAMINE HYDROCHLORIDE 25 MG: 50 INJECTION INTRAMUSCULAR; INTRAVENOUS at 11:22

## 2021-08-09 RX ADMIN — DIPHENHYDRAMINE HCL 25 MG: 25 TABLET ORAL at 05:24

## 2021-08-09 RX ADMIN — FAMOTIDINE 20 MG: 20 TABLET, FILM COATED ORAL at 21:18

## 2021-08-09 RX ADMIN — ASPIRIN 81 MG: 81 TABLET, COATED ORAL at 08:58

## 2021-08-09 RX ADMIN — CLOPIDOGREL BISULFATE 75 MG: 75 TABLET ORAL at 08:58

## 2021-08-09 RX ADMIN — SUCRALFATE 1 G: 1 TABLET ORAL at 11:28

## 2021-08-09 RX ADMIN — ATORVASTATIN CALCIUM 80 MG: 80 TABLET, FILM COATED ORAL at 21:18

## 2021-08-09 RX ADMIN — NIFEDIPINE 30 MG: 30 TABLET, FILM COATED, EXTENDED RELEASE ORAL at 08:51

## 2021-08-09 RX ADMIN — SODIUM CHLORIDE, PRESERVATIVE FREE 10 ML: 5 INJECTION INTRAVENOUS at 21:18

## 2021-08-09 RX ADMIN — CARVEDILOL 25 MG: 25 TABLET, FILM COATED ORAL at 17:42

## 2021-08-09 RX ADMIN — SODIUM CHLORIDE, PRESERVATIVE FREE 10 ML: 5 INJECTION INTRAVENOUS at 15:53

## 2021-08-09 RX ADMIN — ENOXAPARIN SODIUM 40 MG: 40 INJECTION SUBCUTANEOUS at 21:18

## 2021-08-09 RX ADMIN — SODIUM CHLORIDE, PRESERVATIVE FREE 10 ML: 5 INJECTION INTRAVENOUS at 17:40

## 2021-08-09 RX ADMIN — INSULIN LISPRO 3 UNITS: 100 INJECTION, SOLUTION INTRAVENOUS; SUBCUTANEOUS at 17:45

## 2021-08-09 RX ADMIN — INSULIN LISPRO 2 UNITS: 100 INJECTION, SOLUTION INTRAVENOUS; SUBCUTANEOUS at 09:00

## 2021-08-09 RX ADMIN — METHYLPREDNISOLONE SODIUM SUCCINATE 40 MG: 40 INJECTION, POWDER, FOR SOLUTION INTRAMUSCULAR; INTRAVENOUS at 05:24

## 2021-08-09 RX ADMIN — SODIUM CHLORIDE, PRESERVATIVE FREE 10 ML: 5 INJECTION INTRAVENOUS at 11:25

## 2021-08-09 RX ADMIN — INSULIN LISPRO 2 UNITS: 100 INJECTION, SOLUTION INTRAVENOUS; SUBCUTANEOUS at 14:03

## 2021-08-09 RX ADMIN — SODIUM CHLORIDE, PRESERVATIVE FREE 10 ML: 5 INJECTION INTRAVENOUS at 08:51

## 2021-08-09 RX ADMIN — GLIPIZIDE 5 MG: 5 TABLET ORAL at 08:51

## 2021-08-09 RX ADMIN — SUCRALFATE 1 G: 1 TABLET ORAL at 21:18

## 2021-08-09 RX ADMIN — SUCRALFATE 1 G: 1 TABLET ORAL at 08:51

## 2021-08-09 RX ADMIN — SODIUM CHLORIDE, PRESERVATIVE FREE 10 ML: 5 INJECTION INTRAVENOUS at 08:49

## 2021-08-09 RX ADMIN — FAMOTIDINE 20 MG: 20 TABLET, FILM COATED ORAL at 08:58

## 2021-08-09 RX ADMIN — SUCRALFATE 1 G: 1 TABLET ORAL at 17:43

## 2021-08-09 RX ADMIN — LORAZEPAM 0.5 MG: 0.5 TABLET ORAL at 21:18

## 2021-08-09 RX ADMIN — METHYLPREDNISOLONE SODIUM SUCCINATE 40 MG: 40 INJECTION, POWDER, FOR SOLUTION INTRAMUSCULAR; INTRAVENOUS at 15:52

## 2021-08-09 RX ADMIN — CARVEDILOL 25 MG: 25 TABLET, FILM COATED ORAL at 08:50

## 2021-08-09 ASSESSMENT — PAIN SCALES - GENERAL
PAINLEVEL_OUTOF10: 0

## 2021-08-09 NOTE — PLAN OF CARE
Problem: Pain:  Goal: Pain level will decrease  Description: Pain level will decrease  Outcome: Ongoing  Note: Patient denies pain so far this shift. Reminded patient to report any pain, pressure, or shortness of breath to the nurse. Will continue to monitor. Goal: Control of acute pain  Description: Control of acute pain  Outcome: Ongoing     Problem: Falls - Risk of:  Goal: Will remain free from falls  Description: Will remain free from falls  Outcome: Ongoing  Note: Bed locked & in low position, call light in reach, side-rails up x2, bed/chair alarm utilized, non-slip socks on when ambulating, reminded patient to use call light to call for assistance. Goal: Absence of physical injury  Description: Absence of physical injury  Outcome: Ongoing     Problem: Daily Care:  Goal: Daily care needs are met  Description: Daily care needs are met  Outcome: Ongoing  Note: Patient able to complete ADLs. Patient is able to ambulate himself with ease. Problem: Skin Integrity:  Goal: Skin integrity will stabilize  Description: Skin integrity will stabilize  Outcome: Ongoing  Note: He has a red rash all over, presumable medication-related. Brilinta was stopped and Plavix is being used in case the rash is Brilinta related. Problem: Discharge Planning:  Goal: Patients continuum of care needs are met  Description: Patients continuum of care needs are met  Outcome: Ongoing  Note: He is from home with his wife and plans on returning there at discharge. Problem: Serum Glucose Level - Abnormal:  Goal: Ability to maintain appropriate glucose levels will improve  Description: Ability to maintain appropriate glucose levels will improve  Outcome: Ongoing  Note: Glucose checked & covered per physician's orders. Care plan reviewed with patient and his wife. Patient verbalizes understanding of the care plan and contributed to goal setting.

## 2021-08-09 NOTE — PROGRESS NOTES
Hospitalist Progress Note    Patient:  Stephanie Correia      Unit/Bed:3B-37/037-A    YOB: 1965    MRN: [id number]       Acct: [de-identified]     PCP: Kyaw Oneill MD    Date of Admission: 8/5/2021    Assessment/Plan:    Anticipated Discharge in :     [facility]IL[facility]/Nathaniel [facility] [address] Problems    Diagnosis Date Noted    Orthostatic hypotension [I95.1] 08/06/2021    Acute kidney injury [facility]) [N17.9] 08/05/2021       Morbilliform drug reaction, improving  Hypersensitivity to Brilinta?, last dose was 8/7, switched back to plavix  See pictures below  New medications recently introduced, carvedilol, lipitor and brilinta (used to be on plavix)  continue Solumedrol and Diphenhydramine   Continue Pepcid  If no improvement, consider skin biopsy    Syncope secondary to orthostatic hypotension, improving  Likely due to medications vs drug reaction  Cardiology consult for medication adjustments  Carvedilol increased to 12.5 BID, started on nifedipine  Orthostatic vital resolving  May discontinue IVF    Essential HTN, now with orthostatic hypotension  See treatment above    MV CAD s/p PCI 8/3  Has stent on obtuse marginal branch, left posterolateral branch and left anterior descending artery   Will need staged PCI RCA as outpatient  Continue aspirin, statin and plavix     Elevated troponins  Likely due to recent cath and BOONE  No chest pain     Leukocytosis   Likely reactive vs steroid induced     Acute [place] Injury, resolved  Due to hypotension, contrast induced and ARB   Hold losartan and metformin     NAG Metabolic acidosis, improving  Likely due to BOONE  BMP in AM     DM type II  Hold metformin  Ok to resume glimepiride     Obesity  Diet and lifestyle changes     Hx of CVA    Disposition  Follow-up with primary physician/cardio as outpatient once discharged  Possible discharge tomorrow    Chief Complaint:  syncope    Hospital Course:  60 y.o. male, with a PMH MV CAD with recent PCI of obtuse marginal branch, left posterolateral branch and left anterior descending artery last 8/3 and was supposed to come back for staged PCI of RCA, who presented to [address] [place] [address] with complaints of syncopal episode today, witnessed by his wife. Patient states that he just felt weak and dizzy after he stood up, accompanied by blurry vision. Wife was able to assist him, patient did not fall or hit his head. His SBP at home was noted to be low.      He denies CP, SOB, palpitations, N/V, abdominal pain or diarrhea. No fever or chills. His BP in ED was noted to be 88/40, improved after 1L bolus. Patient's trop and creatinine also noted to be elevated. He was re-admitted under the hospitalist service. Cardiology consulted for medication adjustment. Subjective:   Patient seen and examined, appears comfortable in bed, without any signs of cardiorespiratory distress. Orthostatics today negative, afebrile, saturating well on room air. He has diffused rashes, looking a bit better today. Denies SOB, wheezing, swallowing problems or chest pain.        Medications:  Reviewed    Infusion Medications    dextrose      sodium chloride       Scheduled Medications    carvedilol  25 mg Oral BID WC    NIFEdipine  30 mg Oral Daily    methylPREDNISolone  40 mg Intravenous Q12H    famotidine  20 mg Oral BID    insulin lispro  0-6 Units Subcutaneous TID WC    insulin lispro  0-3 Units Subcutaneous Nightly    clopidogrel  75 mg Oral Daily    sodium chloride  1,000 mL Intravenous Once    sodium chloride flush  5-40 mL Intravenous 2 times per day    enoxaparin  40 mg Subcutaneous Q24H    aspirin  81 mg Oral Daily    atorvastatin  80 mg Oral Nightly    glipiZIDE  5 mg Oral QAM AC    sucralfate  1 g Oral 4x Daily AC & HS     PRN Meds: diphenhydrAMINE, LORazepam, diphenhydrAMINE, glucose, dextrose, glucagon (rDNA), dextrose, sodium chloride flush, sodium chloride, ondansetron **OR** ondansetron, polyethylene glycol, acetaminophen **OR** acetaminophen, famotidine      Intake/Output Summary (Last 24 hours) at 8/9/2021 0805  Last data filed at 8/9/2021 0403  Gross per 24 hour   Intake 1080 ml   Output 200 ml   Net 880 ml       Diet:  ADULT DIET; Regular; Low Fat/Low Chol/High Fiber/NERY    Exam:  BP (!) 149/88   Pulse 80   Temp 97.4 °F (36.3 °C) (Oral)   Resp 16   Ht 5' 10\" (1.778 m)   Wt 238 lb 14.4 oz (108.4 kg)   SpO2 100%   BMI 34.28 kg/m²     General appearance: No apparent distress, appears stated age and cooperative. HEENT: Pupils equal, round, and reactive to light. Conjunctivae/corneas clear. Neck: Supple, with full range of motion. No jugular venous distention. Trachea midline. Respiratory:  Normal respiratory effort. Clear to auscultation, bilaterally without Rales/Wheezes/Rhonchi. Cardiovascular: Regular rate and rhythm with normal S1/S2 without murmurs, rubs or gallops. Abdomen: Soft, non-tender, non-distended with normal bowel sounds. Musculoskeletal: passive and active ROM x 4 extremities. Skin: diffused erythematous maculopapular rashes and patches on the trunk, hips, thighs, neck and face  Neurologic:  Neurovascularly intact without any focal sensory/motor deficits. Cranial nerves: II-XII intact, grossly non-focal.  Psychiatric: Alert and oriented, thought content appropriate, normal insight  Capillary Refill: Brisk,< 3 seconds   Peripheral Pulses: +2 palpable, equal bilaterally                           Labs:   Recent Labs     08/07/21  0404 08/08/21  0410 08/09/21 0349   WBC 13.2* 30.8* 29.2*   HGB 13.4* 15.1 12.8*   HCT 40.0* 44.5 37.5*    288 259         Recent Labs     08/07/21  0404 08/08/21  0410 08/09/21  0349    138 139   K 3.7 3.9 4.2    106 106   CO2 17* 19* 23   BUN 22 21 25*   CREATININE 0.9 0.9 0.9   CALCIUM 8.3* 9.3 8.9     Recent Labs     08/09/21  0349   AST 6   ALT 9*   BILIDIR <0.2   BILITOT 0.5   ALKPHOS 92     No results for input(s): INR in the last 72 hours.   No results for input(s): Ed [place] in the last 72 hours. Urinalysis:    No results found for: [place], BACTERIA, RBCUA, BLOODU, [address], [address] [place] [address]    Radiology:  CT Head WO Contrast   Final Result      1. No acute intracranial hemorrhage, mass effect or midline shift. 2. Paranasal sinus disease. **This report has been created using voice recognition software. It may contain minor errors which are inherent in voice recognition technology. **      Final report electronically signed by Dr Raj Hill on 8/5/2021 3:33 PM      XR CHEST PORTABLE   Final Result   There is no acute intrathoracic process. **This report has been created using voice recognition software. It may contain minor errors which are inherent in voice recognition technology. **      Final report electronically signed by Dr Raj Hill on 8/5/2021 2:40 PM          Diet: ADULT DIET;  Regular; Low Fat/Low Chol/High Fiber/NERY    DVT prophylaxis: [] Lovenox                                 [] SCDs                                 [] SQ Heparin                                 [] Encourage ambulation           [] Already on Anticoagulation     Disposition:    [] Home       [] TCU       [] Rehab       [] Psych       [] SNF       [] [place]       [] Other-    Code Status: Full Code    PT/OT Eval Status:         Electronically signed by Tena Ott MD on 8/9/2021 at 8:05 AM

## 2021-08-09 NOTE — CARE COORDINATION
8/9/21, 4:09 PM EDT    DISCHARGE ON [address] day: 3  Location: -30/030-A Reason for admit: Orthostatic hypotension [I95.1]  BOONE (acute kidney injury) ([facility] [address] [N17.9]  Near syncope [R55]  Acute kidney injury ([facility] [address] [N17.9]   Procedure: None  Barriers to Discharge: Hospitalist following. Pt continues with severe rash. Discussed with Hospitalist, plan home when see improvement in rash. Lovenox. Benadryl prn, Solu-medrol iv q12hr. PCP: Connie Patterson MD  Readmission Risk Score: 18%  Patient Goals/Plan/Treatment Preferences: Plans home with wife. No needs.

## 2021-08-09 NOTE — PROGRESS NOTES
Hospitalist Progress Note    Patient:  Edilma Jorgensen      Unit/Bed:3B-37/037-A    YOB: 1965    MRN: [id number]       Acct: [de-identified]     PCP: Acacia Newell MD    Date of Admission: 8/5/2021    Assessment/Plan:    Anticipated Discharge in :     BRUNILDA/Nathaniel Aguilar 1106 Problems    Diagnosis Date Noted    Orthostatic hypotension [I95.1] 08/06/2021    Acute kidney injury [facility]) [N17.9] 08/05/2021       Morbilliform drug reaction  Hypersensitivity to Brilinta?, last dose was 8/7, switched back to plavix  See pictures below  New medications recently introduced, carvedilol, lipitor and brilinta (used to be on plavix)  Give Solumedrol and Diphenhydramine   Continue Pepcid  If no improvement, consider skin biopsy    Syncope secondary to orthostatic hypotension, improving  Likely due to medications vs drug reaction  Cardiology consult for medication adjustments  Carvedilol increased to 12.5 BID, started on nifedipine  Orthostatic vital still positive, so will allow some degree of supine HTN  May discontinue IVF    Essential HTN, now with orthostatic hypotension  See treatment above    MV CAD s/p PCI 8/3  Has stent on obtuse marginal branch, left posterolateral branch and left anterior descending artery   Will need staged PCI RCA as outpatient  Continue aspirin, statin and plavix     Elevated troponins  Likely due to recent cath and BOONE  No chest pain     Leukocytosis   Likely reactive vs steroid induced     Acute Kindey Injury, resolved  Due to hypotension, contrast induced and ARB   Hold losartan and metformin     NAG Metabolic acidosis, improving  Likely due to BOONE  BMP in AM     DM type II  Hold metformin  Ok to resume glimepiride     Obesity  Diet and lifestyle changes     Hx of CVA    Disposition  Follow-up with primary physician/cardio as outpatient once discharged      Chief Complaint:  syncope    Hospital Course:  60 y.o. male, with a PMH MV CAD with recent PCI of obtuse marginal branch, left posterolateral branch and left anterior descending artery last 8/3 and was supposed to come back for staged PCI of RCA, who presented to [address] [place] [address] with complaints of syncopal episode today, witnessed by his wife. Patient states that he just felt weak and dizzy after he stood up, accompanied by blurry vision. Wife was able to assist him, patient did not fall or hit his head. His SBP at home was noted to be low.      He denies CP, SOB, palpitations, N/V, abdominal pain or diarrhea. No fever or chills. His BP in ED was noted to be 88/40, improved after 1L bolus. Patient's trop and creatinine also noted to be elevated. He was re-admitted under the hospitalist service. Cardiology consulted for medication adjustment. Subjective:   Patient seen and examined, appears comfortable in bed, without any signs of cardiorespiratory distress. Positive orthostatics, afebrile, saturating well on room air. He has diffused rashes on the trunk , face and neck, now spread to buttocks and thighs. Denies SOB, wheezing, swallowing problems or chest pain.        Medications:  Reviewed    Infusion Medications    dextrose      sodium chloride       Scheduled Medications    carvedilol  12.5 mg Oral BID WC    NIFEdipine  30 mg Oral Daily    famotidine  20 mg Oral BID    insulin lispro  0-6 Units Subcutaneous TID WC    insulin lispro  0-3 Units Subcutaneous Nightly    clopidogrel  75 mg Oral Daily    methylPREDNISolone  40 mg Intravenous Q8H    sodium chloride  1,000 mL Intravenous Once    sodium chloride flush  5-40 mL Intravenous 2 times per day    enoxaparin  40 mg Subcutaneous Q24H    aspirin  81 mg Oral Daily    atorvastatin  80 mg Oral Nightly    glipiZIDE  5 mg Oral QAM AC    sucralfate  1 g Oral 4x Daily AC & HS     PRN Meds: diphenhydrAMINE, LORazepam, diphenhydrAMINE, glucose, dextrose, glucagon (rDNA), dextrose, sodium chloride flush, sodium chloride, ondansetron **OR** ondansetron, polyethylene glycol, acetaminophen **OR** acetaminophen, famotidine      Intake/Output Summary (Last 24 hours) at 8/8/2021 2020  Last data filed at 8/8/2021 2011  Gross per 24 hour   Intake 1654.7 ml   Output 200 ml   Net 1454.7 ml       Diet:  ADULT DIET; Regular; Low Fat/Low Chol/High Fiber/NERY    Exam:  BP (!) 184/84   Pulse 75   Temp 97.8 °F (36.6 °C) (Oral)   Resp 16   Ht 5' 10\" (1.778 m)   Wt 238 lb 4.8 oz (108.1 kg)   SpO2 98%   BMI 34.19 kg/m²     General appearance: No apparent distress, appears stated age and cooperative. HEENT: Pupils equal, round, and reactive to light. Conjunctivae/corneas clear. Neck: Supple, with full range of motion. No jugular venous distention. Trachea midline. Respiratory:  Normal respiratory effort. Clear to auscultation, bilaterally without Rales/Wheezes/Rhonchi. Cardiovascular: Regular rate and rhythm with normal S1/S2 without murmurs, rubs or gallops. Abdomen: Soft, non-tender, non-distended with normal bowel sounds. Musculoskeletal: passive and active ROM x 4 extremities. Skin: diffused erythematous maculopapular rashes and patches on the trunk, hips, thighs, neck and face  Neurologic:  Neurovascularly intact without any focal sensory/motor deficits. Cranial nerves: II-XII intact, grossly non-focal.  Psychiatric: Alert and oriented, thought content appropriate, normal insight  Capillary Refill: Brisk,< 3 seconds   Peripheral Pulses: +2 palpable, equal bilaterally                                   Labs:   Recent Labs     08/06/21  0334 08/07/21  0404 08/08/21  0410   WBC 13.1* 13.2* 30.8*   HGB 13.1* 13.4* 15.1   HCT 39.3* 40.0* 44.5    221 288     Recent Labs     08/06/21  0334 08/07/21  0404 08/08/21  0410    139 138   K 3.5 3.7 3.9    110 106   CO2 19* 17* 19*   BUN 34* 22 21   CREATININE 1.0 0.9 0.9   CALCIUM 8.2* 8.3* 9.3     No results for input(s): AST, ALT, BILIDIR, BILITOT, ALKPHOS in the last 72 hours.   No results for input(s): INR in the last 72 hours. No results for input(s): Monica Jeison in the last 72 hours. Urinalysis:    No results found for: Elgie Reels, BACTERIA, RBCUA, BLOODU, [address], Leonie [place] [address]    Radiology:  CT Head WO Contrast   Final Result      1. No acute intracranial hemorrhage, mass effect or midline shift. 2. Paranasal sinus disease. **This report has been created using voice recognition software. It may contain minor errors which are inherent in voice recognition technology. **      Final report electronically signed by Dr Kian Dixon on 8/5/2021 3:33 PM      XR CHEST PORTABLE   Final Result   There is no acute intrathoracic process. **This report has been created using voice recognition software. It may contain minor errors which are inherent in voice recognition technology. **      Final report electronically signed by Dr Kian Dixon on 8/5/2021 2:40 PM          Diet: ADULT DIET;  Regular; Low Fat/Low Chol/High Fiber/NERY    DVT prophylaxis: [] Lovenox                                 [] SCDs                                 [] SQ Heparin                                 [] Encourage ambulation           [] Already on Anticoagulation     Disposition:    [] Home       [] TCU       [] Rehab       [] Psych       [] SNF       [] [facility]       [] Other-    Code Status: Full Code    PT/OT Eval Status:         Electronically signed by Zheng Thomson MD on 8/8/2021 at 8:20 PM

## 2021-08-10 VITALS
OXYGEN SATURATION: 95 % | BODY MASS INDEX: 34.16 KG/M2 | SYSTOLIC BLOOD PRESSURE: 140 MMHG | HEIGHT: 70 IN | TEMPERATURE: 97.7 F | WEIGHT: 238.6 LBS | DIASTOLIC BLOOD PRESSURE: 79 MMHG | HEART RATE: 67 BPM | RESPIRATION RATE: 20 BRPM

## 2021-08-10 LAB
ANION GAP SERPL CALCULATED.3IONS-SCNC: 10 MEQ/L (ref 8–16)
BUN BLDV-MCNC: 32 MG/DL (ref 7–22)
CALCIUM SERPL-MCNC: 8.9 MG/DL (ref 8.5–10.5)
CHLORIDE BLD-SCNC: 104 MEQ/L (ref 98–111)
CO2: 23 MEQ/L (ref 23–33)
CREAT SERPL-MCNC: 0.9 MG/DL (ref 0.4–1.2)
ERYTHROCYTE [DISTWIDTH] IN BLOOD BY AUTOMATED COUNT: 13.6 % (ref 11.5–14.5)
ERYTHROCYTE [DISTWIDTH] IN BLOOD BY AUTOMATED COUNT: 44.8 FL (ref 35–45)
GFR SERPL CREATININE-BSD FRML MDRD: 87 ML/MIN/1.73M2
GLUCOSE BLD-MCNC: 200 MG/DL (ref 70–108)
GLUCOSE BLD-MCNC: 251 MG/DL (ref 70–108)
GLUCOSE BLD-MCNC: 278 MG/DL (ref 70–108)
HCT VFR BLD CALC: 37.4 % (ref 42–52)
HEMOGLOBIN: 12.5 GM/DL (ref 14–18)
MCH RBC QN AUTO: 30.1 PG (ref 26–33)
MCHC RBC AUTO-ENTMCNC: 33.4 GM/DL (ref 32.2–35.5)
MCV RBC AUTO: 90.1 FL (ref 80–94)
PLATELET # BLD: 263 THOU/MM3 (ref 130–400)
PMV BLD AUTO: 9.8 FL (ref 9.4–12.4)
POTASSIUM SERPL-SCNC: 4.3 MEQ/L (ref 3.5–5.2)
RBC # BLD: 4.15 MILL/MM3 (ref 4.7–6.1)
SODIUM BLD-SCNC: 137 MEQ/L (ref 135–145)
WBC # BLD: 22.5 THOU/MM3 (ref 4.8–10.8)

## 2021-08-10 PROCEDURE — G0378 HOSPITAL OBSERVATION PER HR: HCPCS

## 2021-08-10 PROCEDURE — 2580000003 HC RX 258: Performed by: FAMILY MEDICINE

## 2021-08-10 PROCEDURE — 80048 BASIC METABOLIC PNL TOTAL CA: CPT

## 2021-08-10 PROCEDURE — 6370000000 HC RX 637 (ALT 250 FOR IP): Performed by: FAMILY MEDICINE

## 2021-08-10 PROCEDURE — 36415 COLL VENOUS BLD VENIPUNCTURE: CPT

## 2021-08-10 PROCEDURE — 82948 REAGENT STRIP/BLOOD GLUCOSE: CPT

## 2021-08-10 PROCEDURE — 99239 HOSP IP/OBS DSCHRG MGMT >30: CPT | Performed by: FAMILY MEDICINE

## 2021-08-10 PROCEDURE — 6360000002 HC RX W HCPCS: Performed by: FAMILY MEDICINE

## 2021-08-10 PROCEDURE — 96376 TX/PRO/DX INJ SAME DRUG ADON: CPT

## 2021-08-10 PROCEDURE — 85027 COMPLETE CBC AUTOMATED: CPT

## 2021-08-10 PROCEDURE — 6370000000 HC RX 637 (ALT 250 FOR IP): Performed by: NURSE PRACTITIONER

## 2021-08-10 RX ORDER — CARVEDILOL 25 MG/1
25 TABLET ORAL 2 TIMES DAILY WITH MEALS
Qty: 60 TABLET | Refills: 1 | Status: ON HOLD | OUTPATIENT
Start: 2021-08-10 | End: 2021-08-26

## 2021-08-10 RX ORDER — PREDNISONE 10 MG/1
TABLET ORAL
Qty: 30 TABLET | Refills: 0 | Status: ON HOLD | OUTPATIENT
Start: 2021-08-10 | End: 2021-08-26

## 2021-08-10 RX ORDER — NIFEDIPINE 30 MG/1
30 TABLET, FILM COATED, EXTENDED RELEASE ORAL DAILY
Qty: 30 TABLET | Refills: 0 | Status: SHIPPED | OUTPATIENT
Start: 2021-08-11 | End: 2021-09-07 | Stop reason: SDUPTHER

## 2021-08-10 RX ORDER — CLOPIDOGREL BISULFATE 75 MG/1
75 TABLET ORAL DAILY
Qty: 30 TABLET | Refills: 2 | Status: SHIPPED | OUTPATIENT
Start: 2021-08-11

## 2021-08-10 RX ORDER — FAMOTIDINE 20 MG/1
20 TABLET, FILM COATED ORAL 2 TIMES DAILY
Qty: 60 TABLET | Refills: 0 | Status: SHIPPED | OUTPATIENT
Start: 2021-08-10 | End: 2022-04-08

## 2021-08-10 RX ORDER — DIPHENHYDRAMINE HCL 25 MG
25 TABLET ORAL EVERY 6 HOURS PRN
Qty: 60 TABLET | Refills: 0 | Status: SHIPPED | OUTPATIENT
Start: 2021-08-10 | End: 2021-09-09

## 2021-08-10 RX ADMIN — GLIPIZIDE 5 MG: 5 TABLET ORAL at 07:58

## 2021-08-10 RX ADMIN — NIFEDIPINE 30 MG: 30 TABLET, FILM COATED, EXTENDED RELEASE ORAL at 08:05

## 2021-08-10 RX ADMIN — CLOPIDOGREL BISULFATE 75 MG: 75 TABLET ORAL at 08:05

## 2021-08-10 RX ADMIN — CARVEDILOL 25 MG: 25 TABLET, FILM COATED ORAL at 07:57

## 2021-08-10 RX ADMIN — SUCRALFATE 1 G: 1 TABLET ORAL at 10:46

## 2021-08-10 RX ADMIN — METHYLPREDNISOLONE SODIUM SUCCINATE 40 MG: 40 INJECTION, POWDER, FOR SOLUTION INTRAMUSCULAR; INTRAVENOUS at 13:10

## 2021-08-10 RX ADMIN — ASPIRIN 81 MG: 81 TABLET, COATED ORAL at 08:05

## 2021-08-10 RX ADMIN — METHYLPREDNISOLONE SODIUM SUCCINATE 40 MG: 40 INJECTION, POWDER, FOR SOLUTION INTRAMUSCULAR; INTRAVENOUS at 05:37

## 2021-08-10 RX ADMIN — SODIUM CHLORIDE, PRESERVATIVE FREE 10 ML: 5 INJECTION INTRAVENOUS at 10:44

## 2021-08-10 RX ADMIN — FAMOTIDINE 20 MG: 20 TABLET, FILM COATED ORAL at 08:05

## 2021-08-10 RX ADMIN — SUCRALFATE 1 G: 1 TABLET ORAL at 06:09

## 2021-08-10 RX ADMIN — SODIUM CHLORIDE, PRESERVATIVE FREE 10 ML: 5 INJECTION INTRAVENOUS at 13:10

## 2021-08-10 RX ADMIN — DIPHENHYDRAMINE HYDROCHLORIDE 25 MG: 50 INJECTION INTRAMUSCULAR; INTRAVENOUS at 08:06

## 2021-08-10 ASSESSMENT — PAIN SCALES - GENERAL: PAINLEVEL_OUTOF10: 0

## 2021-08-10 NOTE — FLOWSHEET NOTE
Discussed discharge summary with patient and his wife. Instructed patient about medications & follow up appointments. Patient denies any additional questions. Patient was discharged with all belongings. No distress noted. Patient discharged to home with his wife. Taken down to the vehicle by transporter per wheelchair.

## 2021-08-10 NOTE — PLAN OF CARE
Problem: Pain:  Goal: Pain level will decrease  Description: Pain level will decrease  8/9/2021 2159 by Demi Hubbard RN  Outcome: Ongoing  Note: Pain level will decrease  8/9/2021 1102 by Lucina Quispe RN  Outcome: Ongoing  Note: Patient denies pain so far this shift. Reminded patient to report any pain, pressure, or shortness of breath to the nurse. Will continue to monitor. Goal: Control of acute pain  Description: Control of acute pain  8/9/2021 2159 by Demi Hubbard RN  Outcome: Ongoing  Note: Control of acute pain  8/9/2021 1102 by Lucina Quispe RN  Outcome: Ongoing  Goal: Control of chronic pain  Description: Control of chronic pain  Outcome: Ongoing  Note: Controls of chronic      Problem: Falls - Risk of:  Goal: Will remain free from falls  Description: Will remain free from falls  8/9/2021 2159 by Demi Hubbard RN  Outcome: Ongoing  Note: Will remain free from falls  8/9/2021 1102 by Lucina Quispe RN  Outcome: Ongoing  Note: Bed locked & in low position, call light in reach, side-rails up x2, bed/chair alarm utilized, non-slip socks on when ambulating, reminded patient to use call light to call for assistance. Goal: Absence of physical injury  Description: Absence of physical injury  8/9/2021 2159 by Demi Hubbard RN  Outcome: Ongoing  Note: Absence of physical injury  8/9/2021 1102 by Lucina Quispe RN  Outcome: Ongoing     Problem: Daily Care:  Goal: Daily care needs are met  Description: Daily care needs are met  8/9/2021 2159 by Demi Hubbard RN  Outcome: Ongoing  Note: Daily care needs will be met  8/9/2021 1102 by Lucina Quispe RN  Outcome: Ongoing  Note: Patient able to complete ADLs. Patient is able to ambulate himself with ease.        Problem: Skin Integrity:  Goal: Skin integrity will stabilize  Description: Skin integrity will stabilize  8/9/2021 2159 by Demi Hubbard RN  Outcome: Ongoing  Note: Skin integrity will stabilize  8/9/2021 1102 by Lucina Quispe RN  Outcome: Ongoing  Note: He has a red rash all over, presumable medication-related. Brilinta was stopped and Plavix is being used in case the rash is Brilinta related. Problem: Discharge Planning:  Goal: Patients continuum of care needs are met  Description: Patients continuum of care needs are met  8/9/2021 2159 by Zoe Covarrubias RN  Outcome: Ongoing  Note: Patients continuum of care needs will be met  8/9/2021 1102 by Lucina León RN  Outcome: Ongoing  Note: He is from home with his wife and plans on returning there at discharge. Problem: Serum Glucose Level - Abnormal:  Goal: Ability to maintain appropriate glucose levels will improve  Description: Ability to maintain appropriate glucose levels will improve  8/9/2021 2159 by Zoe Covarrubias RN  Outcome: Ongoing  Note: Ability to maintain appropriate glucose levels will improve  8/9/2021 1102 by Lucina León RN  Outcome: Ongoing  Note: Glucose checked & covered per physician's orders. Care plan reviewed with patient. Patient verbalize understanding of the plan of care and contribute to goal setting.

## 2021-08-10 NOTE — FLOWSHEET NOTE
08/10/21 1142   Provider Notification   Reason for Communication Review case   Provider Name Charity Wilkinson   Provider Notification Physician   Method of Communication Call   Response Waiting for response   Notification Time [phone]   Discharging this patient, had 4 stents a week ago & came back for allergic reaction. He has another staged PCI 8/26 do we need a follow up before then?

## 2021-08-10 NOTE — PLAN OF CARE
Problem: Pain:  Goal: Pain level will decrease  Description: Pain level will decrease  8/10/2021 1112 by uLcina Torres RN  Outcome: Ongoing  Note: Patient denies pain so far this shift. Reminded patient to report any pain, pressure, or shortness of breath to the nurse. Will continue to monitor. Problem: Pain:  Goal: Control of acute pain  Description: Control of acute pain  8/10/2021 1112 by Lucina Torres RN  Outcome: Ongoing     Problem: Falls - Risk of:  Goal: Will remain free from falls  Description: Will remain free from falls  8/10/2021 1112 by Lucina Torres RN  Outcome: Ongoing  Note: Bed locked & in low position, call light in reach, side-rails up x2, bed/chair alarm utilized, non-slip socks on when ambulating, reminded patient to use call light to call for assistance. Problem: Falls - Risk of:  Goal: Absence of physical injury  Description: Absence of physical injury  8/10/2021 1112 by Lucina Torres RN  Outcome: Ongoing     Problem: Daily Care:  Goal: Daily care needs are met  Description: Daily care needs are met  8/10/2021 1112 by Lucina Torres RN  Outcome: Ongoing  Note: Patient able to complete ADLs. Assistance provided as needed. Problem: Skin Integrity:  Goal: Skin integrity will stabilize  Description: Skin integrity will stabilize  8/10/2021 1112 by Lucina Torres RN  Outcome: Ongoing  Note: His rash is getting lighter. Problem: Discharge Planning:  Goal: Patients continuum of care needs are met  Description: Patients continuum of care needs are met  8/10/2021 1112 by Lucina Torres RN  Outcome: Ongoing  Note:  He is from home with his wife and plans on returning there at discharge.        Problem: Serum Glucose Level - Abnormal:  Goal: Ability to maintain appropriate glucose levels will improve  Description: Ability to maintain appropriate glucose levels will improve  8/10/2021 1112 by Lucina Torres RN  Outcome: Ongoing  Note: Glucose checked & covered per physician's orders. Care plan reviewed with patient. Patient verbalizes understanding of the care plan and contributed to goal setting.

## 2021-08-11 NOTE — PROGRESS NOTES
CLINICAL PHARMACY NOTE: MEDS TO BEDS    Total # of Prescriptions Filled: 6   The following medications were delivered to the patient:  Nifedipine ER 30mg  Clopidogrel 75mg  Carvedilol 25mg  Allergy Relief 25mg  Prednisone 10mg  Heartburn Relief Max Strength 20mg    Additional Documentation:

## 2021-08-16 ENCOUNTER — TELEPHONE (OUTPATIENT)
Dept: CARDIOLOGY CLINIC | Age: 56
End: 2021-08-16

## 2021-08-16 NOTE — TELEPHONE ENCOUNTER
Pt wife called saying when he was discharged they changed his coreg to 25 BID     But its been dropping his BP too low   90/60    Wife states they have been cutting it in half 12.5 BID and its been staying at 120-130/70s    Asking if ok to keep 12.5 BID instead     Send to [facility] when he returns

## 2021-08-16 NOTE — DISCHARGE SUMMARY
Hospital Medicine Discharge Summary      Patient Identification:   Kelley Bloom   : 1965  MRN: [id number]   Account: [de-identified]      Patient's PCP: Mihaela Pina MD    Admit Date: 2021     Discharge Date: 8/10/2021     Admitting Physician: Miguel Peterson MD     Discharge Physician: Miguel Peterson MD     Discharge Diagnoses: Active Hospital Problems    Diagnosis Date Noted    Orthostatic hypotension [I95.1] 2021    Acute kidney injury [facility]) [N17.9] 2021       The patient was seen and examined on day of discharge and this discharge summary is in conjunction with any daily progress note from day of discharge. Hospital Course:   Per admission H&P:    60 y. o. male, with a PMH MV CAD with recent PCI of obtuse marginal branch, left posterolateral branch and left anterior descending artery last 8/3 and was supposed to come back for staged PCI of RCA, who presented to [address] [place] [address] with complaints of syncopal episode today, witnessed by his wife. Patient states that he just felt weak and dizzy after he stood up, accompanied by blurry vision. Wife was able to assist him, patient did not fall or hit his head. His SBP at home was noted to be low.      He denies CP, SOB, palpitations, N/V, abdominal pain or diarrhea. No fever or chills. His BP in ED was noted to be 88/40, improved after 1L bolus. Patient's trop and creatinine also noted to be elevated. He was re-admitted under the hospitalist service. Cardiology consulted for medication adjustment. Patient developed diffused pruritic rash, likely related to Brilinta.      TREATMENT COURSE    Morbilliform drug reaction, improving  Hypersensitivity to Brilinta?, last dose was , switched back to plavix  See pictures below, rashes improved  Other new medications recently introduced, carvedilol and lipitor   continue Solumedrol and Diphenhydramine, switched to prednisone taper on discharge  Continue Pepcid     Syncope secondary to orthostatic hypotension, improving  Likely due to medications vs drug reaction  Cardiology consult for medication adjustments  Carvedilol increased to 12.5 BID, restarted on nifedipine     Essential HTN, now with orthostatic hypotension  See treatment above     MV CAD s/p PCI 8/3  Has stent on obtuse marginal branch, left posterolateral branch and left anterior descending artery   Will need staged PCI RCA as outpatient  Continue aspirin, statin and plavix     Elevated troponins  Likely due to recent cath and BOONE  No chest pain     Leukocytosis   Likely reactive vs steroid induced     Acute Kindey Injury, resolved  Due to hypotension, contrast induced and ARB   Losartan discontinued     NAG Metabolic acidosis, improving  Likely due to BOONE     DM type II  Resume home meds     Obesity  Diet and lifestyle changes     Hx of CVA    Exam:     Vitals:  Vitals:    08/09/21 2349 08/10/21 0500 08/10/21 0730 08/10/21 1205   BP: 139/73 127/70 138/70 (!) 140/79   Pulse: 74 73 78 67   Resp: 16 16 20    Temp: 97.5 °F (36.4 °C) 97.9 °F (36.6 °C) 97.5 °F (36.4 °C) 97.7 °F (36.5 °C)   TempSrc:   Oral    SpO2: 97% 97% 95%    Weight:  238 lb 9.6 oz (108.2 kg)     Height:         Weight: Weight: 238 lb 9.6 oz (108.2 kg)     24 hour intake/output:No intake or output data in the 24 hours ending 08/15/21 2303      General appearance: No apparent distress, appears stated age and cooperative. HEENT: Pupils equal, round, and reactive to light. Conjunctivae/corneas clear. Neck: Supple, with full range of motion. No jugular venous distention. Trachea midline. Respiratory:  Normal respiratory effort. Clear to auscultation, bilaterally without Rales/Wheezes/Rhonchi. Cardiovascular: Regular rate and rhythm with normal S1/S2 without murmurs, rubs or gallops. Abdomen: Soft, non-tender, non-distended with normal bowel sounds. Musculoskeletal: passive and active ROM x 4 extremities.   Skin: diffused erythematous maculopapular rashes and patches on the trunk, hips, thighs, neck and face, improved  Neurologic:  Neurovascularly intact without any focal sensory/motor deficits. Cranial nerves: II-XII intact, grossly non-focal.  Psychiatric: Alert and oriented, thought content appropriate, normal insight  Capillary Refill: Brisk,< 3 seconds   Peripheral Pulses: +2 palpable, equal bilaterally                              Labs: For convenience and continuity at follow-up the following most recent labs are provided:      CBC:    Lab Results   Component Value Date    WBC 22.5 08/10/2021    HGB 12.5 08/10/2021    HCT 37.4 08/10/2021     08/10/2021       Renal:    Lab Results   Component Value Date     08/10/2021    K 4.3 08/10/2021    K 3.5 08/06/2021     08/10/2021    CO2 23 08/10/2021    BUN 32 08/10/2021    CREATININE 0.9 08/10/2021    CALCIUM 8.9 08/10/2021         Significant Diagnostic Studies    Radiology:   CT Head WO Contrast   Final Result      1. No acute intracranial hemorrhage, mass effect or midline shift. 2. Paranasal sinus disease. **This report has been created using voice recognition software. It may contain minor errors which are inherent in voice recognition technology. **      Final report electronically signed by Dr Erik Hermosillo on 8/5/2021 3:33 PM      XR CHEST PORTABLE   Final Result   There is no acute intrathoracic process. **This report has been created using voice recognition software. It may contain minor errors which are inherent in voice recognition technology. **      Final report electronically signed by Dr Erik Hermosillo on 8/5/2021 2:40 PM             Consults:     IP CONSULT TO CARDIOLOGY    Disposition:    [x] Home       [] TCU       [] Rehab       [] Psych       [] SNF       [] [facility]       [] Other-    Condition at Discharge: Stable    Code Status:  Prior     Patient Instructions:    Discharge lab work:    Activity: activity as tolerated  Diet: No diet orders on file      Follow-up visits:   Jere Yoder MD  [address] [facility] [address]  [phone]    On 8/11/2021  Appointment time is:10:10 AM, If unable to keep appt, please call and reschedule, Please bring photo ID, Insurance card and Med List         Discharge Medications:      Dena Helper   Home Medication Instructions BUL:[id number]    Printed on:08/15/21 0183   Medication Information                      aspirin 81 MG EC tablet  Take 1 tablet by mouth daily             atorvastatin (LIPITOR) 80 MG tablet  Take 1 tablet by mouth nightly             Blood Pressure Monitor KIT  Use once daily             carvedilol (COREG) 25 MG tablet  Take 1 tablet by mouth 2 times daily (with meals)             clopidogrel (PLAVIX) 75 MG tablet  Take 1 tablet by mouth daily             diphenhydrAMINE (BENADRYL) 25 MG tablet  Take 1 tablet by mouth every 6 hours as needed for Itching or Allergies             famotidine (PEPCID) 20 MG tablet  Take 1 tablet by mouth 2 times daily             glimepiride (AMARYL) 2 MG tablet  Take 2 mg by mouth every morning (before breakfast). metFORMIN (GLUCOPHAGE) 500 MG tablet  Take 500 mg by mouth 2 times daily (with meals). NIFEdipine (ADALAT CC) 30 MG extended release tablet  Take 1 tablet by mouth daily             nitroGLYCERIN (NITROSTAT) 0.4 MG SL tablet  up to max of 3 total doses. If no relief after 1 dose, call 911.              pantoprazole (PROTONIX) 40 MG tablet  Take 1 tablet by mouth 2 times daily             predniSONE (DELTASONE) 10 MG tablet  Take 4 tablets daily for 3 days, then 3 tablets daily for 3 days, then 2 tablets daily for 3 days, then 1 tablet daily for 3 days, then stop.             sucralfate (CARAFATE) 1 GM tablet  Take 1 tablet by mouth 4 times daily                 Time Spent on discharge is more than 45 minutes in the examination, evaluation, counseling and review of medications and discharge plan. Signed: Thank you Nicki Ahmadi MD for the opportunity to be involved in this patient's care.     Electronically signed by Corinna Hoyt MD on 8/15/2021 at 11:03 PM

## 2021-08-18 NOTE — TELEPHONE ENCOUNTER
Patient verbalized understanding. He verbalized understanding to let our office know if blood pressure continues to run low.

## 2021-09-07 RX ORDER — NIFEDIPINE 30 MG/1
30 TABLET, FILM COATED, EXTENDED RELEASE ORAL DAILY
Qty: 30 TABLET | Refills: 1 | Status: SHIPPED | OUTPATIENT
Start: 2021-09-07 | End: 2021-10-15

## 2021-09-07 NOTE — PLAN OF CARE
Hospital Facility-Based Program  [facility]/Traditional Cardiac Rehab  PHYSICIAN ORDER  Class I Level B based on research  Medical Director:  Dr. Reji Rivas MD     Patient Name: Janki Lindsay : 1965  Referring Physician: Dr. Ambreen Lacye  Date: 2021  Allergies: Allergies as of 2021 - Fully Reviewed 2021   Allergen Reaction Noted    Bactrim [sulfamethoxazole-trimethoprim] Swelling 2013    Lasix [furosemide] Other (See Comments) 2013    Rocephin [ceftriaxone] Hives 2021    Brilinta [ticagrelor] Rash 2021        Diagnosis:  PCI on 2021    [x] [facility] with telemetry monitoring, resting and exercise        BPs & HRs with each session. Hospital setting for patient safety. [x] 72 sessions: 36 exercise sessions, 36 education sessions   [] 36 sessions: 18 exercise sessions, 18 education sessions  [] Traditional Cardiac Rehab with telemetry monitoring, resting and exercise BPs &       HRs with each session. Hospital setting for patient safety. [] 36 sessions:  32 exercise sessions, 4 education sessions     Per Patient symptoms, proceed with:   [x]Nitroglycerine 0.4mg SL every 5 minutes prn, maximum of 3, for chest pain   [x]12-lead EKG for symptoms of chest pain or noted change in heart rhythm   [x]Administer O2 per nasal cannula for symptoms of chest pain or acute dyspnea    Physician Prescribed Exercise:  Plan of Care:  Patient to attend exercise sessions with aerobic endurance and strength training for a total of 31-60 min/day, 3 days/week with supplemented 30+ minutes of aerobic exercise at home on days not participating in Cardiac Rehab. Aerobic Endurance Training  Aerobic Endurance mode (TM, AD, NS) starting at 5-8 minutes progressing by 2-3 minutes each week to a total of 15-30 minutes 2-3x/week.   Arms only 5 min  Stair step increasing to 2 min  Resistance/strength training:  Hand weights starting at 1-5 lbs

## 2021-09-07 NOTE — TELEPHONE ENCOUNTER
April Ross called requesting a refill on the following medications:  Requested Prescriptions     Pending Prescriptions Disp Refills    NIFEdipine (ADALAT CC) 30 MG extended release tablet 30 tablet 0     Sig: Take 1 tablet by mouth daily     Pharmacy verified: [address] [place] [address]   .       Date of last visit: 7/30/2021  Date of next visit (if applicable): 3/07/7934
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
## 2021-09-13 ENCOUNTER — OFFICE VISIT (OUTPATIENT)
Dept: CARDIOLOGY CLINIC | Age: 56
End: 2021-09-13
Payer: MEDICARE

## 2021-09-13 VITALS
DIASTOLIC BLOOD PRESSURE: 79 MMHG | BODY MASS INDEX: 32.62 KG/M2 | SYSTOLIC BLOOD PRESSURE: 136 MMHG | WEIGHT: 233 LBS | HEART RATE: 85 BPM | HEIGHT: 71 IN

## 2021-09-13 DIAGNOSIS — I25.10 CORONARY ARTERY DISEASE INVOLVING NATIVE CORONARY ARTERY OF NATIVE HEART WITHOUT ANGINA PECTORIS: Primary | ICD-10-CM

## 2021-09-13 DIAGNOSIS — E78.5 DYSLIPIDEMIA: ICD-10-CM

## 2021-09-13 DIAGNOSIS — I10 ESSENTIAL HYPERTENSION: ICD-10-CM

## 2021-09-13 PROCEDURE — 93000 ELECTROCARDIOGRAM COMPLETE: CPT | Performed by: NURSE PRACTITIONER

## 2021-09-13 PROCEDURE — 99214 OFFICE O/P EST MOD 30 MIN: CPT | Performed by: NURSE PRACTITIONER

## 2021-09-13 RX ORDER — TRAMADOL HYDROCHLORIDE 50 MG/1
50 TABLET ORAL 3 TIMES DAILY
COMMUNITY
End: 2022-04-08

## 2021-09-13 RX ORDER — LANOLIN ALCOHOL/MO/W.PET/CERES
3 CREAM (GRAM) TOPICAL DAILY
COMMUNITY

## 2021-09-13 NOTE — PATIENT INSTRUCTIONS
Continue the Coreg at 12.5 mg at night as you are currently taking. Continue to check your blood pressure as you have been. If you are feeling poorly or if blood pressure is consistently running in the 110's or lower then call the office. Start cardiac rehab as scheduled. 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 May use Ibuprofen 200 - 400 mg twice daily for chronic pain in your lower legs (was taking 800mg BID with Plavix prior). Make sure you eat with this medication and stay well hydrated.

## 2021-09-13 NOTE — PROGRESS NOTES
Patient presents in office today for a follow s/p cardiac cath with stenting done on 08/26/2021. EKG done today. C/o cp when bending over, sob on exertion, fatique, light headedness, and dizziness.      Denies palpitations,  MIGDALIA

## 2021-09-20 ENCOUNTER — HOSPITAL ENCOUNTER (OUTPATIENT)
Dept: CARDIAC REHAB | Age: 56
Setting detail: THERAPIES SERIES
Discharge: HOME | End: 2021-09-20
Payer: MEDICARE

## 2021-09-20 PROCEDURE — G0423 INTENS CARDIAC REHAB NO EXER: HCPCS

## 2021-09-20 PROCEDURE — G0422 INTENS CARDIAC REHAB W/EXERC: HCPCS

## 2021-09-20 NOTE — PROGRESS NOTES
Video Education Report - ICR/CR  Name:  Erika Chavez     Date:  9/20/2021  MRN: [id number]     Session #:  1  Session Length: 40 min    Core Videos        []Heart Disease Risk Reduction       [x]Overview of Pritikin Eating Plan      []Move it          []Calorie Density         []Healthy Minds, Bodies, Hearts        []Label Reading - Part 1       []Metabolic Syndrome and Belly Fat        []How Our Thoughts Can Heal Our Hearts   []Dining Out - Part 1      []Biomechanical Limitations  []Facts on Fat        []Hypertension & Heart Disease    []Diseases of Our Time - Focusing on Diabetes   []Body Composition  []Nutrition Action Plan    []Exercise Action Plan    Comments:  Video completed, group discussion

## 2021-09-20 NOTE — PROGRESS NOTES
Preparation  [] Action  [] Maintenance  [] Relapse [] Pre Contemplation  [] Contemplation  [] Preparation  [] Action  [] Maintenance  [] Relapse [] Pre Contemplation  [] Contemplation  [] Preparation  [] Action  [] Maintenance  [] Relapse [] Pre Contemplation  [] Contemplation  [] Preparation  [] Action  [] Maintenance  [] Relapse   EXERCISE ASSESSMENT EXERCISE ASSESSMENT EXERCISE ASSESSMENT EXERCISE ASSESSMENT EXERCISE ASSESSMENT EXERCISE ASSESSMENT   6 Min Walk Test  Distance walked: 0.13 miles  686 ft.  2.0 METs  Max HR:97 BPM      RPE:  10   THR:  126-140  Rhythm:  NSR     6 Min Walk Test  Distance walked:  Miles   ft   METs  Max HR:  BPM      RPE:      %Change ft. =    Rhythm:     DASI: 4.6 METs DASI: ** METs DASI: ** METs DASI: ** METs DASI: ** METs DASI: ** METs   Return to Work  [employer] on returning to work? [] Yes              [] No   [] Disabled     [x] Retired     Return to work:   Return to work:  Has Leena Aas returned to work? [] Yes    [] No    Return to work date set? [] Yes, **    [] No    Leena Aas is doing ** at work. Return to work:  Has Leena Aas returned to work? [] Yes    [] No    Return to work date set? [] Yes, **    [] No    Leena Aas is doing ** at work. Return to work:  Has Leena Aas returned to work? [] Yes    [] No    Return to work date set? [] Yes, **    [] No    Leena Aas is doing ** at work. Return to work:  Has Leena Aas returned to work? [] Yes    [] No    Return to work date set? [] Yes, **    [] No    *Required MET Level achieved for job duties? [] Yes    [] No   Orthopedic Limitations/  [x] Yes    [] No  If yes please list:  Only one toe on left foot. Years ago a large metal piece fell on his legs and crushed them. Grafts were taken from his arms and stomach to reconstruct his legs. Feet and legs hurt after walking for a long period of time.        Orthopedic Limitations  *If patient has orthopedic issue:   Actions/  accomodations needed to make Leena Aas successful : Orthopedic Limitations   Orthopedic Limitations   Orthopedic Limitations Orthopedic Limitations     Fall Risk  Fall risk assessed? [x] Yes      [] No    Balance Issues? [x] Yes      [] No   [x] Safety issues reviewed      Fall Risk  *If patient is a fall risk, action needed to accommodate:  Fall Risk Fall Risk Fall Risk Fall Risk   Home Exercise  [x] Yes    [] No  Type: walking  Frequency: daily   Duration: quarter mile Home Exercise  [] Yes    [] No  Type: **  Frequency:**  Duration: ** Home Exercise  [] Yes    [] No  Type: **  Frequency: **  Duration: ** Home Exercise  [] Yes    [] No  Type: **  Frequency: **  Duration: ** Home Exercise  [] Yes    [] No  Type: **  Frequency: **  Duration: ** Home Exercise  [] Yes    [] No  Type: **  Frequency: **  Duration: **   Angina with Activity? [x] Yes    [] No  Angina Management: pt reported that he does get pain in his chest when he bends over. He has let his Dr know. Angina with Activity? [] Yes    [] No  Angina Management: ** Angina with Activity? [] Yes    [] No  Angina Management: ** Angina with Activity? [] Yes    [] No  Angina Management: ** Angina with Activity? [] Yes    [] No  Angina Management: ** Angina with Activity?   [] Yes    [] No  Angina Management: **   EXERCISE PLAN EXERCISE PLAN EXERCISE PLAN EXERCISE PLAN EXERCISE PLAN EXERCISE PLAN   *Interventions* *Interventions* *Interventions* *Interventions* *Interventions* *Interventions*   Exercise Prescription  (per physician & CR staff) Exercise Prescription  (per physician & CR staff) Exercise Prescription  (per physician & CR staff) Exercise Prescription  (per physician & CR staff) Exercise Prescription  (per physician & CR staff) Exercise Prescription  (per physician & CR staff)   Cardiovascular Cardiovascular Cardiovascular Cardiovascular Cardiovascular Cardiovascular   Mode:    [x] Treadmill (TM)  [x] Schwinn Airdyne (AD)  [x] Arms Ergometer (AE)   MODE:    [] Treadmill (TM)  [] Schwinn Airdyne (AD)  [] Arms Ergometer (AE)  [] NuStep  [] Elliptical (E) MODE:    [] Treadmill (TM)  [] Schwinn Airdyne (AD)  [] Arms Ergometer (AE)  [] NuStep  [] Elliptical (E) MODE:    [] Treadmill (TM)  [] Schwinn Airdyne (AD)  [] Arms Ergometer (AE)  [] NuStep  [] Elliptical (E) MODE:    [] Treadmill (TM)  [] Schwinn Airdyne (AD)  [] Arms Ergometer (AE)  [] NuStep  [] Elliptical (E) MODE:    [] Treadmill (TM)  [] Schwinn Airdyne (AD)  [] Arms Ergometer (AE)  [] NuStep  [] Elliptical (E)   Initial Workloads  TM: [email or web address] METs  AD: 0.8 level = 2.1 METs  NS: 66  Borrero= 2.6 METs  AE: 0.4 level = 1.6 METs Current Workloads  TM:  @ %=  METs  AD:  level =  METs  NS:   Borre[place]=  METs  AE:  level =  METs Current Workloads  TM:  @ %=  METs  AD:  level =  METs  NS:   [place]re[place]=  METs  AE:  level =  METs Current Workloads  TM:  @ %=  METs  AD:  level =  METs  NS:   [place]re[place]=  METs  AE:  level =  METs Current Workloads  TM:  @ %=  METs  AD:  level =  METs  NS:   Borrero=  METs  AE:  level =  METs Current Workloads  TM:  @ %=  METs  AD:  level =  METs  NS:   Borrero=  METs  AE:  level =  METs   Frequency:    ICR: 3x/week  Home: 2-3x/wk Frequency:   ICR: 3x/week  Home: 3x/wk Frequency:  ICR: 3x/week  Home: 3-4x/wk Frequency:  ICR: 3x/week  Home: 3-4x/wk Frequency:  ICR: 3x/week  Home: 3-4x/wk Frequency:  Madiha De La Rosa will continue exercise at  5-7 days/week   Duration:   Total aerobic exercise = 29 min    8min/mode Duration:  Total aerobic exercises = ** min     **min/mode Duration:  Total aerobic exercises = ** min     **min/mode Duration:  Total aerobic exercises = ** min     **min/mode Duration:  Total aerobic exercises = ** min     **min/mode Duration:  Total erobic exercise =  60-90 min   Intensity:   MET Level = 2.1  RPE = 12-15 Intensity:  Max MET Level = **  RPE = 12-15 Intensity:  Max MET Level = **  RPE = 12-15 Intensity:  Max MET Level = **  RPE = 12-15 Intensity:  Max MET Level = **  RPE = 12-15 Intensity:  Max MET Level = ** RPE = 12-15   Progression: increase aerobic activity up to 15 min over next 4 weeks by increasing time 2-3 min/week. Progression:   Progression:   Progression: Progression: Progression:  Increase time/intensity when RPE <13, and HR is in [facility]   [x] Yes      [] No  Upper and Lower body strength training 2x/wk    Wt: 2#       Reps: 8-15    *Increase wt. after completing 15 reps with an RPE of <12/13. [] Yes      [] No  Upper and Lower body strength training 2x/wk    Wt: **#       Reps:  8-15    *Increase wt. after completing 15 reps with an RPE of <12/13. [] Yes      [] No  Upper and Lower body strength training 2x/wk    Wt: **#       Reps:  8-15    *Increase wt. after completing 15 reps with an RPE of <12/13. [] Yes      [] No  Upper and Lower body strength training 2x/wk    Wt: **#       Reps:  8-15    *Increase wt. after completing 15 reps with an RPE of <12/13. [] Yes      [] No  Upper and Lower body strength training 2x/wk    Wt: **#       Reps:  8-15    *Increase wt. after completing 15 reps with an RPE of <12/13. [] Yes      [] No  Upper and Lower body strength training 2x/wk    Wt: **#       Reps:  8-15    *Increase wt. after completing 15 reps with an RPE of <12/13. Flexibility Flexibility Flexibility Flexibility Flexibility Flexibility   [x] Yes      [] No  25 min session of Core Strength & Flexibility 1x/per week  Attends Core Strength & Flexibility   [] Yes      [] No Attends Core Strength & Flexibility   [] Yes      [] No Attends Core Strength & Flexibility   [] Yes      [] No Attends Core Strength & Flexibility   [] Yes      [] No Continue Core Strength & Flexibility at home   Home Exercise  *Janee Fong verbalizes planning to walk 2 days/week for 10-15 min on days not in rehab. Home Exercise  *Vlad verbalizes planning to ** ** days/week for ** min on days not in rehab.  Home Exercise  *Janee Fong verbalizes planning to ** ** days/week for ** min on days not in rehab. Home Exercise  *Vlad verbalizes planning to ** ** days/week for ** min on days not in rehab. Home Exercise  *Vlad verbalizes planning to ** ** days/week for ** min on days not in rehab. Home Exercise  *Estephanie Jones verbalizes his/her plan to ** ** days/week for ** min @ **   *Education* *Education* *Education* *Education* *Education* *Education*   RPE Scale  [x] Yes      [] No  Exercise Safety  [x] Yes      [] No  Equipment Orientation  [x] Yes      [] No  S/S to Report  [x] Yes      [] No  Warm Up/Cool Down  [x] Yes      [] No  Home Exercise  [x] Yes      [] No     All Exercise Education Completed  [] Yes      [] No   Exercise Education Recommended    Workshops  *Benefits of Exercise  *Balance Training & Fall Prevention  *Heart Disease Risk Reduction  *Yoga & Heart Health Exercise Education Attended/Date Exercise Education Attended/Date Exercise Education Attended/Date Exercise Education Attended/Date All Sessions Completed?     [] Yes  [] No   *Goals* *Goals* *Goals* *Goals* *Goals* *Goals*   Initial Exercise  Exercise Goals Exercise Goals Exercise Goals Exercise Goals Exercise Goals    Vlad plans to:  [x] Attend exercise sessions 3x/wk  [x] initiate home exercise 2-3x/wk for 10-20 min  [x] Increase 6 min walk distance by 10%  [x] Attend Exercise workshops Estephanie Jones plans to:  [] Attend exercise sessions 3x/wk  [] Continue home exercise 2-3x/wk for 20-30 min  [] Increase 6 min walk distance by 10%  [x] Attend Exercise workshops Estephanie Jones plans to:  [] Attend exercise sessions 3x/wk  [] continue home exercise 3-4x/wk for 30-40 min  [] Increase 6 min walk distance by 10%  [x] Attend Exercise workshops Estephanie Jones plans to:  [] Attend exercise sessions 3x/wk  [] continue home exercise 3-4x/wk for 30-45 min  [] Increase 6 min walk distance by 10%  [x] Attend Exercise workshops Estephanie Jones plans to:  [] Attend exercise sessions 3x/wk  [] continue home exercise 3-4x/wk for 30-45 min  [] Increase 6 min walk distance by 10%  [x] Attend Exercise workshops Pk achieved exercise goals?    Yes    [] No  If no, why?  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  Return to ADL or Hobbies:  Pk**  Return to ADL or Hobbies:  Pk ** Return to ADL or Hobbies:  Pk  ** Return to ADL or Hobbies:  Pk ** Return to ADL or Hobbies:  Pk **    *MET level required for above goal:  4.0 METs MET level Achieved:  **METs MET level Achieved:  **METs MET level Achieved:  **METs MET level Achieved:  **METs MET level Achieved:  **METs     Individual Cardiac Treatment Plan - Nutrition  NUTRITION  ASSESSMENT/PLAN NUTRITION  REASSESSMENT NUTRITION   REASSESSMENT NUTRITION   REASSESSMENT NUTRITION   REASSESSMENT NUTRITION  DISCHARGE/FOLLOW-UP   Stages of Change Stages of Change Stages of Change Stages of Change Stages of Change Stages of Change   [] Pre Contemplation  [x] Contemplation  [] Preparation  [] Action  [] Maintenance  [] Relapse [] Pre Contemplation  [] Contemplation  [] Preparation  [] Action  [] Maintenance  [] Relapse [] Pre Contemplation  [] Contemplation  [] Preparation  [] Action  [] Maintenance  [] Relapse [] Pre Contemplation  [] Contemplation  [] Preparation  [] Action  [] Maintenance  [] Relapse [] Pre Contemplation  [] Contemplation  [] Preparation  [] Action  [] Maintenance  [] Relapse [] Pre Contemplation  [] Contemplation  [] Preparation  [] Action  [] Maintenance  [] Relapse   NUTRITION ASSESSMENT NUTRITION ASSESSMENT NUTRITION ASSESSMENT NUTRITION ASSESSMENT NUTRITION ASSESSMENT NUTRITION ASSESSMENT   Weight Management  Weight: 234.8        Height: 5'11  BMI: 32.6 Weight Management  Weight: **                  Weight Management  Weight: **                  Weight Management  Weight: ** Weight Management  Weight: ** Weight Management  Weight: **                 Height: **   BMI: **   Eating Plan  Current eating habits: pays attention to red meat, fat and sodium. More fruits and veggies  Eating Plan  Changes: Eating Plan  Changes: Eating Plan  Changes: Eating Plan  Changes: Eating Plan Improvements:   Alcohol Use  [x] none          [] daily  [] weekly      [] special         Diet Assessment Tool:  RATE YOUR PLATE  *Given to patient to complete and return. Diet Assessment Tool:    Score: **/69        Diet Assessment Tool: RATE YOUR PLATE  Score: **/93     NUTRITION PLAN NUTRITION PLAN NUTRITION PLAN NUTRITION PLAN NUTRITION PLAN NUTRITION PLAN   *Interventions* *Interventions* *Interventions* *Interventions* *Interventions* *Interventions*   Initial Survey given Goal Setting Discussion:   [] Yes      [] No        Follow Up Survey Reviewed & Goals Updated:     Professional Referral  Please check if needed. [] Dietitian Consult   [] Wt. Management Referral  [] Other:     Professional Referral  Please check if needed. [] Dietitian Consult   [] Wt. Management Referral  [] Other:  Professional Referral  Please check if needed. [] Dietitian Consult   [] Wt. Management Referral  [] Other:  Professional Referral  Please check if needed. [] Dietitian Consult   [] Wt. Management Referral  [] Other:  Professional Referral  Please check if needed. [] Dietitian Consult   [] Wt. Management Referral  [] Other:  Professional Referral  Please check if needed. [] Dietitian Consult   [] Wt.  Management Referral  [] Other:    *Education* *Education* *Education* *Education* *Education* *Education*   Nutritional Education Recommended    [x]1:1 Registered Dietitian    Workshops: 2  *Label Reading   *Menu  *Targeting Nutrition Priorities  *Mindful Eating Nutritional Education Attended/Date Nutritional Education   Attended/Date Nutritional Education   Attended/Date Nutritional Education   Attended/Date All Sessions Completed? [] Yes  [] No   Cooking School    (x)5 sessions recommended     [] Adding Flavor  [] Fast & Healthy     Breakfasts  [] Salads & Dressings  [] Savory Soups  [] Simple Sides & Sauces  [] Appetizers &     Snacks  [] Delicious Desserts  [] Plant-Based Proteins  [] Fast Evening Meals  [] Weekend Breakfasts  [] Efficiency Cooking  [] [employer]  Sessions Completed    Attended/Date [facility] [employer]  Sessions Completed    Attended/Date   [facility] [employer]  Sessions Completed    Attended/Date [facility] [employer]  Sessions Completed    Attended/Date Cooking School    # of sessions completed:  **   *Goals* *Goals* *Goals* *Goals* *Goals* *Goals*   Vlad's nutritional goals are as follows:  [x] Attend Nutrition Workshops  [x] Attend 1:1   [x] Attend Cooking Classes  [x] Complete and return diet survey Vlad's nutritional goals are as follows:  [] Attend Nutrition Workshops  [] Attend 1:1   [] Attend Cooking Classes  [] ** Vlad's nutritional goals are as follows:  [] Attend Nutrition Workshops  [] Attend 1:1   [] Attend Cooking Classes  [] Complete and return diet survey  [] ** Vlad's nutritional goals are as follows:  [] Attend Nutrition Workshops  [] Attend 1:1   [] Attend Cooking Classes  [] ** Vlad's nutritional goals are as follows:  [] Attend Nutrition Workshops  [] Attend 1:1   [] Attend Cooking Classes  [] ** Vlad achieved nutritional goals   [] Yes    [] No  If no, why?   Use knowledge gained to continue Pritikin eating plan at home     Individual Cardiac Treatment Plan - Psychosocial  PSYCHOSOCIAL  ASSESSMENT/PLAN PSYCHOSOCIAL  REASSESSMENT PSYCHOSOCIAL   REASSESSMENT PSYCHOSOCIAL   REASSESSMENT PSYCHOSOCIAL   REASSESSMENT PSYCHOSOCIAL  DISCHARGE/FOLLOW-UP   Stages of Change Stages of Change Stages of Change Stages of Change Stages of Change Stages of Change   [] Pre Contemplation  [x] Contemplation  [] Preparation  [] Action  [] Maintenance  [] Relapse [] Pre Contemplation  [] Contemplation  [] Preparation  [] Action  [] Maintenance  [] Relapse [] Pre Contemplation  [] Contemplation  [] Preparation  [] Action  [] Maintenance  [] Relapse [] Pre Contemplation  [] Contemplation  [] Preparation  [] Action  [] Maintenance  [] Relapse [] Pre Contemplation  [] Contemplation  [] Preparation  [] Action  [] Maintenance  [] Relapse [] Pre Contemplation  [] Contemplation  [] Preparation  [] Action  [] Maintenance  [] Relapse   PSYCHOSOCIAL ASSESSMENT PSYCHOSOCIAL ASSESSMENT PSYCHOSOCIAL ASSESSMENT PSYCHOSOCIAL ASSESSMENT PSYCHOSOCIAL ASSESSMENT PSYCHOSOCIAL ASSESSMENT   Behavioral Outcomes Behavioral Outcomes Behavioral Outcomes Behavioral Outcomes Behavioral Outcomes Behavioral Outcomes   Tool Used:  Bethanie & Victor Manuel, Quality of Life Index, Cardiac Version IV  *Given to patient to complete. Tool Used:    Bethanie & Victor Manuel, Quality of Life Index, Cardiac Version IV   QOL Index Score: **    HF:**  S&E:**  P&S: **  Family: **    Tool Used:     Bethanie Rush, Quality of Life Index, Cardiac Version IV  QOL Index Score: **    HF:**  S&E:**  P&S: **  Family: **   PHQ-9 score 2  Depression Severity  [x]Minimal  []Mild   []Moderate  []Moderately Severe    []Severe     PHQ-9 score **  Depression Severity  []Minimal  []Mild   []Moderate  []Moderately Severe    []Severe   Does patient have Family Support? [x] Yes      [] No  No signs of marital/family distress        Within the Past Month:  *Have you wished you were dead or wished you could go to sleep and not wake up? [] Yes      [x] No  *Have you had any thoughts of killing yourself?   [] Yes      [x] No          Using a scale of 0-10, 0=none, 10=very:   Rate your depression: 0  Rate your anxiety:  0  Using a scale of 0-10, 0=none, 10=very:   Rate your depression: **  Rate your anxiety:  ** Using a scale of 0-10, 0=none, 10=very: Rate your depression: **  Rate your anxiety:  ** Using a scale of 0-10, 0=none, 10=very:   Rate your depression: **  Rate your anxiety:  ** Using a scale of 0-10, 0=none, 10=very:   Rate your depression: **  Rate your anxiety:  ** Using a scale of 0-10, 0=none, 10=very:   Rate your depression: **  Rate your anxiety:  **   Signs and Symptoms of Depression Present? [] Yes      [x] No Signs and Symptoms of Depression Present? [] Yes      [] No  If yes, please explain:  ** Signs and Symptoms of Depression Present? [] Yes      [] No  If yes, please explain:  ** Signs and Symptoms of Depression Present? [] Yes      [] No  If yes, please explain:  ** Signs and Symptoms of Depression Present? [] Yes      [] No  If yes, please explain:  ** Signs and Symptoms of Depression Present? [] Yes      [] No  If yes, please explain:  **   Signs and Symptoms of Anxiety Present? [] Yes      [x] No   Signs and Symptoms of Anxiety Present? [] Yes      [] No  If yes, please explain:  ** Signs and Symptoms of Anxiety Present? [] Yes      [] No  If yes, please explain:  ** Signs and Symptoms of Anxiety Present? [] Yes      [] No  If yes, please explain:  ** Signs and Symptoms of Anxiety Present? [] Yes      [] No  If yes, please explain:  ** Signs and Symptoms of Anxiety Present?     [] Yes      [] No  If yes, please explain:  **           PSYCHOSOCIAL PLAN PSYCHOSOCIAL PLAN PSYCHOSOCIAL PLAN PSYCHOSOCIAL PLAN PSYCHOSOCIAL PLAN PSYCHOSOCIAL PLAN   *Interventions* *Interventions* *Interventions* *Interventions* *Interventions* *Interventions*   *Please check if needed  [] Psych Consult  [] Physician Referral  [x] Stress Management Skills *Please check if needed  [] Psych Consult  [] Physician Referral  [] Stress Management Skills *Please check if needed  [] Psych Consult  [] Physician Referral  [] Stress Management Skills *Please check if needed  [] Psych Consult  [] Physician Referral  [] Stress Management Skills *Please check if needed  [] Psych Consult  [] Physician Referral  [] Stress Management Skills *Please check if needed  [] Psych Consult  [] Physician Referral  [] Stress Management Skills   Is patient currently taking anti-depressant or anti-anxiety medications? [] Yes      [x] No Is patient currently taking anti-depressant or anti-anxiety medications? [] Yes      [] No  If yes, please list medications:  ** Is patient currently taking anti-depressant or anti-anxiety medications? [] Yes      [] No  If yes, please list medications:  ** Is patient currently taking anti-depressant or anti-anxiety medications? [] Yes      [] No  If yes, please list medications:  ** Is patient currently taking anti-depressant or anti-anxiety medications? [] Yes      [] No  If yes, please list medications:  ** Is patient currently taking anti-depressant or anti-anxiety medications?   [] Yes      [] No  If yes, please list medications:  **   *Education* *Education* *Education* *Education* *Education* *Education*   Healthy Mind-Set Workshops Recommended: 2    *Stress & Health  *Taking Charge of Stress  *New Thoughts, New Behaviors  Managing Moods & Relationships Healthy Mind-Set Workshops Attended/Date Healthy Mind-Set Workshops Attended/Date Healthy Mind-Set Workshops Attended/Date Healthy Mind-Set Workshops Attended/Date Healthy Mind-Set Workshops  Completed  [] Yes      [] No   *Goals* *Goals* *Goals* *Goals* *Goals* *Goals*   Vlad's psychosocial goals are as follows:  [x] Complete HADS & Bethanie & Victor Manuel, Quality of Life Index, Cardiac Version IV  [x] Attend Healthy Mind-Set Workshops  [x] Reduce depression symptom severity by 1 level Vlad's psychosocial goals are as follows:  [] Attend Healthy Mind-Set Workshops  [] Reduce depression symptom severity by 1 level  [] ** Rudy psychosocial goals are as follows:  [] Attend Healthy Mind-Set Workshops  [] Reduce depression symptom severity by 1 level  [] ** Vlad's psychosocial Barriers Addressed:  [] Yes      [] No Learning Barriers Addressed:  [] Yes      [] No     RISK FACTOR/EDUCATION PLAN RISK FACTOR/EDUCATION PLAN RISK FACTOR/EDUCATION PLAN RISK FACTOR/EDUCATION PLAN RISK FACTOR/EDUCATION PLAN RISK FACTOR/EDUCATION PLAN   *Interventions* *Interventions* *Interventions* *Interventions* *Interventions* *Interventions*   Core Educational Videos    [] Overview of The Pritikin Eating Plan  [] Heart Disease Risk Reduction  [] Move It! [] Calorie Density  [] Healthy Minds, Bodies, Hearts  [] Label Reading  [] Metabolic Syndrome & Belly   Or  [] How Our Thoughts Can Heal our Heart  [] Dining Out-Part 1  [] Biomechanical Limitations  [] Facts on Fat  [] Hypertension & Heart Disease  [] Diseases of Our Times-Focusing on Diabetes  [] Body Composition  [] Nurtition Action Plan  [] Exercise Action Plan     Completed Videos/Date      9/20/21  Overview of The Pritikin Eating Plan Completed Videos/Date Completed Videos/Date Completed Videos/Date Recommended Educational Videos Completed    [] Yes      [] No    **If not completed, Why? **           Smoking Cessation/Relaspe Prevention Intervention needed? [] Yes      [x] No    Smoking Cessation/Relapse Prevention Scheduled? [] Yes      [] No  Date:  ** Smoking Cessation/Relapse Prevention Scheduled? [] Yes      [] No  Date:  ** Smoking Cessation/Relapse Prevention Scheduled? [] Yes      [] No  Date:  ** Smoking Cessation/Relapse Prevention Scheduled?    [] Yes      [] No  Date:  ** Smoking Cessation Counseling attended  [] Yes      [] No  **If not completed, Why? **   Professional Referrals:  *Please check if needed  [] Diabetes Clinic  [] Lipid Clinic   [] Other:      Professional Referrals:  *Please check if needed  [] Diabetes Clinic  [] Lipid Clinic   [] Other:   Preventative Medication Preventative Medication Preventative Medication Preventative Medication Preventative Medication Preventative Medication   Aspirin  [x] Yes    [] No  Blood Thinner: Clopidogrel/Effient/Brillinta  [x] Yes    [] No  Beta Blocker  [x] Yes    [] No  Ace Inhibitor  [] Yes    [x] No  Statin/Lipid Lowering  [x] Yes    [] No Medication Changes? [] Yes    [] No Medication Changes? [] Yes    [] No Medication Changes? [] Yes    [] No Medication Changes? [] Yes    [] No Medication Changes? [] Yes    [] No   *Education* *Education* *Education* *Education* *Education* *Education*   Does Fermin Alexis require any additional education? []Yes    [x]No   Does Fermin Alexis require any additional education? [] Yes    [] No Does Fermin Alexis require any additional education? [] Yes    [] No Does Fermin Alexis require any additional education? []  Yes    [] No Does Fermin Alexis require any additional education? []  Yes    [] No Does Fermin Alexis require any additional education?   [] Yes    [] No   Additional Educational Videos    Exercise  [] Improve Performance    Medical  [] Aging Enhancing Your QoL  [] Biology of Weight Control  [] Decoding Lab Results  [] Diseases of Our Time - Overview  [] Sleep Disorders    Nutrition  [] Dining Out - Part 2  [] Fueling a Healthy Body  [] Menu Workshop  [] Planning Your Eating Strategy  [] Targeting Your Nutrition Priorities  [] Vitamins & Minerals    Healthy Mind-Set  [] Smoking Cessation    Culinary  []Becoming a Pritikin    [] Cooking - Breakfast & Snacks  [] Cooking -[place]alhty Salads & Dressing  [] Cooking -Dinner & Sides  [] Cooking -Soups & Desserts    Overview  [] The Pritikin Solution   Additional Educational Videos Completed           Additional Educational Videos Completed Additional Educational Videos Completed Additional Educational Videos Completed Additional Educational Videos Completed    [] Yes    [] No   *Goals* *Goals* *Goals* *Goals* *Goals* *Goals*   Rudy risk factor/education goals are as follows:    [x] Optimal BP <140/90  [x] Blood Sugar <120  [x] Attend recommended video education sessions  [x] Takes medications as prescribed 100% of the time    Rudy risk factor/education goals are as follows:    [x] Optimal BP <140/90  [] Blood Sugar <120  [x] Attend recommended video education sessions  [x] Takes medications as prescribed 100% of the time   [] ** Vlad's risk factor/education goals are as follows:    [x] Optimal BP <140/90  [] Blood Sugar <120  [x] Attend recommended video education sessions  [x] Takes medications as prescribed 100% of the time   [] ** Vlad's risk factor/education goals are as follows:    [x] Optimal BP <140/90  [] Blood Sugar <120  [x] Attend recommended video education sessions  [x] Takes medications as prescribed 100% of the time   [] ** Vlad's risk factor/education goals are as follows:    [x] Optimal BP <140/90  [] Blood Sugar <120  [x] Attend recommended video education sessions  [x] Takes medications as prescribed 100% of the time   [] Daxa Reveal achieved risk factor goals?   [] Yes    [] No  If no, why?  **               Monitored telemetry has revealed NSR   Monitored telemetry has revealed **  [] documented arrhythmia at increasing workloads  [] associated symptoms ** Monitored telemetry has revealed  [] documented arrhythmia at increasing workloads  [] associated symptoms ** Monitored telemetry has revealed **  [] documented arrhythmia at increasing workloads  [] associated symptoms ** Monitored telemetry has revealed **  [] documented arrhythmia at increasing workloads  [] associated symptoms ** Monitored telemetry has revealed **  [] documented arrhythmia at increasing workloads  [] associated symptoms **   Physician Response    [x] Cardiac rehab is reasonably and medically necessary for continuous cardiac monitoring surveillance  of patient's cardiac activity  [x] Initiate continuous telemetry monitoring and notify me with any concerns  [] Other   Physician Response    [x] Cardiac rehab is reasonably and medically necessary for continuous cardiac monitoring surveillance  of patient's cardiac activity  [x] Continue continuous telemetry monitoring and notify me with any concerns  [] Other     Physician Response    [x] Cardiac rehab is reasonably and medically necessary for continuous cardiac monitoring surveillance  of patient's cardiac activity  [x] Continue continuous telemetry monitoring and notify me with any concerns   [] Other     Physician Response    [x] Cardiac rehab is reasonably and medically necessary for continuous cardiac monitoring surveillance  of patient's cardiac activity  [x] Continue continuous telemetry monitoring and notify me with any concerns   [] Other   Physician Response    [x] Cardiac rehab is reasonably and medically necessary for continuous cardiac monitoring surveillance  of patient's cardiac activity  [x] Continue continuous telemetry monitoring and notify me with any concerns   [] Other

## 2021-09-22 ENCOUNTER — APPOINTMENT (OUTPATIENT)
Dept: CARDIAC REHAB | Age: 56
End: 2021-09-22
Payer: MEDICARE

## 2021-09-22 ENCOUNTER — HOSPITAL ENCOUNTER (OUTPATIENT)
Dept: CARDIAC REHAB | Age: 56
Setting detail: THERAPIES SERIES
Discharge: HOME | End: 2021-09-22
Payer: MEDICARE

## 2021-09-22 PROCEDURE — 93798 PHYS/QHP OP CAR RHAB W/ECG: CPT

## 2021-09-24 ENCOUNTER — APPOINTMENT (OUTPATIENT)
Dept: CARDIAC REHAB | Age: 56
End: 2021-09-24
Payer: MEDICARE

## 2021-09-27 ENCOUNTER — APPOINTMENT (OUTPATIENT)
Dept: CARDIAC REHAB | Age: 56
End: 2021-09-27
Payer: MEDICARE

## 2021-09-27 ENCOUNTER — HOSPITAL ENCOUNTER (OUTPATIENT)
Dept: CARDIAC REHAB | Age: 56
Setting detail: THERAPIES SERIES
Discharge: HOME | End: 2021-09-27
Payer: MEDICARE

## 2021-09-27 PROCEDURE — 93798 PHYS/QHP OP CAR RHAB W/ECG: CPT

## 2021-09-27 NOTE — PROGRESS NOTES
[facility]  Phase 2 Cardiac Rehab Weekly Progress Report      Patient switched to [facility] program. Initiate and progress as tolerated.

## 2021-09-29 ENCOUNTER — HOSPITAL ENCOUNTER (OUTPATIENT)
Dept: CARDIAC REHAB | Age: 56
Setting detail: THERAPIES SERIES
End: 2021-09-29
Payer: MEDICARE

## 2021-09-29 ENCOUNTER — APPOINTMENT (OUTPATIENT)
Dept: CARDIAC REHAB | Age: 56
End: 2021-09-29
Payer: MEDICARE

## 2021-10-04 ENCOUNTER — HOSPITAL ENCOUNTER (OUTPATIENT)
Dept: CARDIAC REHAB | Age: 56
Setting detail: THERAPIES SERIES
Discharge: HOME | End: 2021-10-04
Payer: MEDICARE

## 2021-10-04 NOTE — PROGRESS NOTES
[id number] Facility-Based Program  Individualized Cardiac Treatment Plan    Patient Name:  Nevin Britt  :  1965  Age:  60 y.o. MRN: [id number]    Diagnosis: PCI  Date of Event: 21   Physician:  Dr. Karena Norman  Next Office Visit:  10/15/21  Date Entered Program: 21  Risk Stratifications: [] Low [x] Intermediate [] High  Allergies: Allergies   Allergen Reactions    Bactrim [Sulfamethoxazole-Trimethoprim] Swelling    Lasix [Furosemide] Other (See Comments)     BP bottoms out    Rocephin [Ceftriaxone] Hives    Brilinta [Ticagrelor] Rash       COVID -19 Screen  Do you have any of the following symptoms:  [] Fever [] Cough [] SOB [] Muscle/Body Ache [] Loss of taste/smell [x] None    Have you traveled outside of the [place]? [] Yes      [x] No    Have you been around anyone who has tested Positive for COVID 19?  [] Yes      [x] No    The following Education has been completed with patient. [x] Wait in the lobby until we call you back to rehab. [x] You must wear a mask while in the medical center, and in cardiac rehab. Please bring your own. [x] Bring your own bottle of water with you.       Individual Cardiac Treatment Plan -EXERCISE  INITIAL 30 DAY 60 DAY 90 DAY FINAL DAY FINAL DAY   EXERCISE  ASSESSMENT/PLAN EXERCISE  REASSESSMENT EXERCISE   REASSESSMENT EXERCISE   REASSESSMENT EXERCISE   REASSESSMENT EXERCISE  DISCHARGE/FOLLOW-UP   Date: 21 Date:  Date: Date: Date: Date: 10/04/21   Session #1  First Exercise Session:  2021 Session #  Session #  Session #  Session #  Session #4  Last Exercises Session: 21   Stages of Change Stages of Change Stages of Change Stages of Change Stages of Change Stages of Change   [] Pre Contemplation  [x] Contemplation  [] Preparation  [] Action  [] Maintenance  [] Relapse [] Pre Contemplation  [] Contemplation  [] Preparation  [] Action  [] Maintenance  [] Relapse [] Pre Contemplation  [] Contemplation  [] Preparation  [] Action  [] Maintenance  [] Relapse [] Pre Contemplation  [] Contemplation  [] Preparation  [] Action  [] Maintenance  [] Relapse [] Pre Contemplation  [] Contemplation  [] Preparation  [] Action  [] Maintenance  [] Relapse Patient called to be discharged from the program due to other medical issues. EXERCISE ASSESSMENT EXERCISE ASSESSMENT EXERCISE ASSESSMENT EXERCISE ASSESSMENT EXERCISE ASSESSMENT EXERCISE ASSESSMENT   6 Min Walk Test  Distance walked: 0.13 miles  686 ft.  2.0 METs  Max HR:97 BPM      RPE:  10   THR:  126-140  Rhythm:  NSR     6 Min Walk Test  Distance walked:  Miles   ft   METs  Max HR:  BPM      RPE:      %Change ft. =    Rhythm:     DASI: 4.6 METs DASI: ** METs DASI: ** METs DASI: ** METs DASI: ** METs DASI: ** METs   Return to Work  [employer] on returning to work? [] Yes              [] No   [] Disabled     [x] Retired     Return to work:   Return to work:  Has Prerna Potts returned to work? [] Yes    [] No    Return to work date set? [] Yes, **    [] No    Prerna Potts is doing ** at work. Return to work:  Has Prerna Potts returned to work? [] Yes    [] No    Return to work date set? [] Yes, **    [] No    Prerna Potts is doing ** at work. Return to work:  Has Prerna Potts returned to work? [] Yes    [] No    Return to work date set? [] Yes, **    [] No    Prerna Potts is doing ** at work. Return to work:  Has Prerna Potts returned to work? [] Yes    [] No    Return to work date set? [] Yes, **    [] No    *Required MET Level achieved for job duties? [] Yes    [] No   Orthopedic Limitations/  [x] Yes    [] No  If yes please list:  Only one toe on left foot. Years ago a large metal piece fell on his legs and crushed them. Grafts were taken from his arms and stomach to reconstruct his legs. Feet and legs hurt after walking for a long period of time.        Orthopedic Limitations  *If patient has orthopedic issue:   Actions/  accomodations needed to make Prerna Potts successful : Orthopedic Limitations   Orthopedic Limitations   Orthopedic Limitations Orthopedic Limitations     Fall Risk  Fall risk assessed? [x] Yes      [] No    Balance Issues? [x] Yes      [] No   [x] Safety issues reviewed      Fall Risk  *If patient is a fall risk, action needed to accommodate:  Fall Risk Fall Risk Fall Risk Fall Risk   Home Exercise  [x] Yes    [] No  Type: walking  Frequency: daily   Duration: quarter mile Home Exercise  [] Yes    [] No  Type: **  Frequency:**  Duration: ** Home Exercise  [] Yes    [] No  Type: **  Frequency: **  Duration: ** Home Exercise  [] Yes    [] No  Type: **  Frequency: **  Duration: ** Home Exercise  [] Yes    [] No  Type: **  Frequency: **  Duration: ** Home Exercise  [] Yes    [] No  Type: **  Frequency: **  Duration: **   Angina with Activity? [x] Yes    [] No  Angina Management: pt reported that he does get pain in his chest when he bends over. He has let his Dr know. Angina with Activity? [] Yes    [] No  Angina Management: ** Angina with Activity? [] Yes    [] No  Angina Management: ** Angina with Activity? [] Yes    [] No  Angina Management: ** Angina with Activity? [] Yes    [] No  Angina Management: ** Angina with Activity?   [] Yes    [] No  Angina Management: **   EXERCISE PLAN EXERCISE PLAN EXERCISE PLAN EXERCISE PLAN EXERCISE PLAN EXERCISE PLAN   *Interventions* *Interventions* *Interventions* *Interventions* *Interventions* *Interventions*   Exercise Prescription  (per physician & CR staff) Exercise Prescription  (per physician & CR staff) Exercise Prescription  (per physician & CR staff) Exercise Prescription  (per physician & CR staff) Exercise Prescription  (per physician & CR staff) Exercise Prescription  (per physician & CR staff)   Cardiovascular Cardiovascular Cardiovascular Cardiovascular Cardiovascular Cardiovascular   Mode:    [x] Treadmill (TM)  [x] Schwinn Airdyne (AD)  [x] Arms Ergometer (AE)   MODE:    [] Treadmill (TM)  [] Schwinn Airdyne (AD)  [] Arms Ergometer (AE)  [] NuStep  [] Elliptical (E) MODE:    [] Treadmill (TM)  [] Schwinn Airdyne (AD)  [] Arms Ergometer (AE)  [] NuStep  [] Elliptical (E) MODE:    [] Treadmill (TM)  [] Schwinn Airdyne (AD)  [] Arms Ergometer (AE)  [] NuStep  [] Elliptical (E) MODE:    [] Treadmill (TM)  [] Schwinn Airdyne (AD)  [] Arms Ergometer (AE)  [] NuStep  [] Elliptical (E) MODE:    [] Treadmill (TM)  [] Schwinn Airdyne (AD)  [] Arms Ergometer (AE)  [] NuStep  [] Elliptical (E)   Initial Workloads  TM: [email or web address] METs  AD: 0.8 level = 2.1 METs  NS: 66  [place]= 2.6 METs  AE: 0.4 level = 1.6 METs Current Workloads  TM:  @ %=  METs  AD:  level =  METs  NS:   Borre[place]=  METs  AE:  level =  METs Current Workloads  TM:  @ %=  METs  AD:  level =  METs  NS:   [place]re[place]=  METs  AE:  level =  METs Current Workloads  TM:  @ %=  METs  AD:  level =  METs  NS:   [place]=  METs  AE:  level =  METs Current Workloads  TM:  @ %=  METs  AD:  level =  METs  NS:   [place]re[place]=  METs  AE:  level =  METs Current Workloads  TM:  @ %=  METs  AD:  level =  METs  NS:   [place]re[place]=  METs  AE:  level =  METs   Frequency:    ICR: 3x/week  Home: 2-3x/wk Frequency:   ICR: 3x/week  Home: 3x/wk Frequency:  ICR: 3x/week  Home: 3-4x/wk Frequency:  ICR: 3x/week  Home: 3-4x/wk Frequency:  ICR: 3x/week  Home: 3-4x/wk Frequency:  Devora Cazares will continue exercise at  5-7 days/week   Duration:   Total aerobic exercise = 29 min    8min/mode Duration:  Total aerobic exercises = ** min     **min/mode Duration:  Total aerobic exercises = ** min     **min/mode Duration:  Total aerobic exercises = ** min     **min/mode Duration:  Total aerobic exercises = ** min     **min/mode Duration:  Total erobic exercise =  60-90 min   Intensity:   MET Level = 2.1  RPE = 12-15 Intensity:  Max MET Level = **  RPE = 12-15 Intensity:  Max MET Level = **  RPE = 12-15 Intensity:  Max MET Level = **  RPE = 12-15 Intensity:  Max MET Level = **  RPE = 12-15 Intensity:  Max MET Level = ** RPE = 12-15   Progression: increase aerobic activity up to 15 min over next 4 weeks by increasing time 2-3 min/week. Progression:   Progression:   Progression: Progression: Progression:  Increase time/intensity when RPE <13, and HR is in [facility]   [x] Yes      [] No  Upper and Lower body strength training 2x/wk    Wt: 2#       Reps: 8-15    *Increase wt. after completing 15 reps with an RPE of <12/13. [] Yes      [] No  Upper and Lower body strength training 2x/wk    Wt: **#       Reps:  8-15    *Increase wt. after completing 15 reps with an RPE of <12/13. [] Yes      [] No  Upper and Lower body strength training 2x/wk    Wt: **#       Reps:  8-15    *Increase wt. after completing 15 reps with an RPE of <12/13. [] Yes      [] No  Upper and Lower body strength training 2x/wk    Wt: **#       Reps:  8-15    *Increase wt. after completing 15 reps with an RPE of <12/13. [] Yes      [] No  Upper and Lower body strength training 2x/wk    Wt: **#       Reps:  8-15    *Increase wt. after completing 15 reps with an RPE of <12/13. [] Yes      [] No  Upper and Lower body strength training 2x/wk    Wt: **#       Reps:  8-15    *Increase wt. after completing 15 reps with an RPE of <12/13. Flexibility Flexibility Flexibility Flexibility Flexibility Flexibility   [x] Yes      [] No  25 min session of Core Strength & Flexibility 1x/per week  Attends Core Strength & Flexibility   [] Yes      [] No Attends Core Strength & Flexibility   [] Yes      [] No Attends Core Strength & Flexibility   [] Yes      [] No Attends Core Strength & Flexibility   [] Yes      [] No Continue Core Strength & Flexibility at home   Home Exercise  *Teresa Del Angel verbalizes planning to walk 2 days/week for 10-15 min on days not in rehab. Home Exercise  *Vlad verbalizes planning to ** ** days/week for ** min on days not in rehab.  Home Exercise  *Teresa Del Angel verbalizes planning to ** ** days/week for ** min on days not in rehab. Home Exercise  *Vlad verbalizes planning to ** ** days/week for ** min on days not in rehab. Home Exercise  *Vlad verbalizes planning to ** ** days/week for ** min on days not in rehab. Home Exercise  *Chuckie Kussmaul verbalizes his/her plan to ** ** days/week for ** min @ **   *Education* *Education* *Education* *Education* *Education* *Education*   RPE Scale  [x] Yes      [] No  Exercise Safety  [x] Yes      [] No  Equipment Orientation  [x] Yes      [] No  S/S to Report  [x] Yes      [] No  Warm Up/Cool Down  [x] Yes      [] No  Home Exercise  [x] Yes      [] No     All Exercise Education Completed  [] Yes      [] No   Exercise Education Recommended    Workshops  *Benefits of Exercise  *Balance Training & Fall Prevention  *Heart Disease Risk Reduction  *Yoga & Heart Health Exercise Education Attended/Date Exercise Education Attended/Date Exercise Education Attended/Date Exercise Education Attended/Date All Sessions Completed?     [] Yes  [] No   *Goals* *Goals* *Goals* *Goals* *Goals* *Goals*   Initial Exercise  Exercise Goals Exercise Goals Exercise Goals Exercise Goals Exercise Goals    Vlad plans to:  [x] Attend exercise sessions 3x/wk  [x] initiate home exercise 2-3x/wk for 10-20 min  [x] Increase 6 min walk distance by 10%  [x] Attend Exercise workshops Chuckie Kussmaul plans to:  [] Attend exercise sessions 3x/wk  [] Continue home exercise 2-3x/wk for 20-30 min  [] Increase 6 min walk distance by 10%  [x] Attend Exercise workshops Chuckie Kussmaul plans to:  [] Attend exercise sessions 3x/wk  [] continue home exercise 3-4x/wk for 30-40 min  [] Increase 6 min walk distance by 10%  [x] Attend Exercise workshops Chuckie Kussmaul plans to:  [] Attend exercise sessions 3x/wk  [] continue home exercise 3-4x/wk for 30-45 min  [] Increase 6 min walk distance by 10%  [x] Attend Exercise workshops Chuckie Kussmaul plans to:  [] Attend exercise sessions 3x/wk  [] continue home exercise 3-4x/wk for 30-45 min  [] Increase 6 min walk distance by 10%  [x] Attend Exercise workshops [facility] achieved exercise goals?    Yes    [] No  If no, why?  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  Return to ADL or Hobbies:  [facility]**  Return to ADL or Hobbies:  [facility] ** Return to ADL or Hobbies:  [facility]  ** Return to ADL or Hobbies:  [facility] ** Return to ADL or Hobbies:  [facility] **    *MET level required for above goal:  4.0 METs MET level Achieved:  **METs MET level Achieved:  **METs MET level Achieved:  **METs MET level Achieved:  **METs MET level Achieved:  **METs     Individual Cardiac Treatment Plan - Nutrition  NUTRITION  ASSESSMENT/PLAN NUTRITION  REASSESSMENT NUTRITION   REASSESSMENT NUTRITION   REASSESSMENT NUTRITION   REASSESSMENT NUTRITION  DISCHARGE/FOLLOW-UP   Stages of Change Stages of Change Stages of Change Stages of Change Stages of Change Stages of Change   [] Pre Contemplation  [x] Contemplation  [] Preparation  [] Action  [] Maintenance  [] Relapse [] Pre Contemplation  [] Contemplation  [] Preparation  [] Action  [] Maintenance  [] Relapse [] Pre Contemplation  [] Contemplation  [] Preparation  [] Action  [] Maintenance  [] Relapse [] Pre Contemplation  [] Contemplation  [] Preparation  [] Action  [] Maintenance  [] Relapse [] Pre Contemplation  [] Contemplation  [] Preparation  [] Action  [] Maintenance  [] Relapse [] Pre Contemplation  [] Contemplation  [] Preparation  [] Action  [] Maintenance  [] Relapse   NUTRITION ASSESSMENT NUTRITION ASSESSMENT NUTRITION ASSESSMENT NUTRITION ASSESSMENT NUTRITION ASSESSMENT NUTRITION ASSESSMENT   Weight Management  Weight: 234.8        Height: 5'11  BMI: 32.6 Weight Management  Weight: **                  Weight Management  Weight: **                  Weight Management  Weight: ** Weight Management  Weight: ** Weight Management  Weight: **                 Height: **   BMI: **   Eating Plan  Current eating habits: pays attention to red meat, fat and sodium. More fruits and veggies  Eating Plan  Changes: Eating Plan  Changes: Eating Plan  Changes: Eating Plan  Changes: Eating Plan Improvements:   Alcohol Use  [x] none          [] daily  [] weekly      [] special         Diet Assessment Tool:  RATE YOUR PLATE  *Given to patient to complete and return. Diet Assessment Tool:    Score: **/69        Diet Assessment Tool: RATE YOUR PLATE  Score: **/76     NUTRITION PLAN NUTRITION PLAN NUTRITION PLAN NUTRITION PLAN NUTRITION PLAN NUTRITION PLAN   *Interventions* *Interventions* *Interventions* *Interventions* *Interventions* *Interventions*   Initial Survey given Goal Setting Discussion:   [] Yes      [] No        Follow Up Survey Reviewed & Goals Updated:     Professional Referral  Please check if needed. [] Dietitian Consult   [] Wt. Management Referral  [] Other:     Professional Referral  Please check if needed. [] Dietitian Consult   [] Wt. Management Referral  [] Other:  Professional Referral  Please check if needed. [] Dietitian Consult   [] Wt. Management Referral  [] Other:  Professional Referral  Please check if needed. [] Dietitian Consult   [] Wt. Management Referral  [] Other:  Professional Referral  Please check if needed. [] Dietitian Consult   [] Wt. Management Referral  [] Other:  Professional Referral  Please check if needed. [] Dietitian Consult   [] Wt.  Management Referral  [] Other:    *Education* *Education* *Education* *Education* *Education* *Education*   Nutritional Education Recommended    [x]1:1 Registered Dietitian    Workshops: 2  *Label Reading   *Menu  *Targeting Nutrition Priorities  *Mindful Eating Nutritional Education Attended/Date Nutritional Education   Attended/Date Nutritional Education   Attended/Date Nutritional Education   Attended/Date All Sessions Completed? [] Yes  [] No   Cooking School    (x)5 sessions recommended     [] Adding Flavor  [] Fast & Healthy     Breakfasts  [] Salads & Dressings  [] Savory Soups  [] Simple Sides & Sauces  [] Appetizers &     Snacks  [] Delicious Desserts  [] Plant-Based Proteins  [] Fast Evening Meals  [] Weekend Breakfasts  [] Efficiency Cooking  [] [employer]  Sessions Completed    Attended/Date [facility]  Sessions Completed    Attended/Date   [facility]  Sessions Completed    Attended/Date [facility]  Sessions Completed    Attended/Date Cooking School    # of sessions completed:  **   *Goals* *Goals* *Goals* *Goals* *Goals* *Goals*   Vlad's nutritional goals are as follows:  [x] Attend Nutrition Workshops  [x] Attend 1:1   [x] Attend Cooking Classes  [x] Complete and return diet survey Vlad's nutritional goals are as follows:  [] Attend Nutrition Workshops  [] Attend 1:1   [] Attend Cooking Classes  [] ** Vlad's nutritional goals are as follows:  [] Attend Nutrition Workshops  [] Attend 1:1   [] Attend Cooking Classes  [] Complete and return diet survey  [] ** Vlad's nutritional goals are as follows:  [] Attend Nutrition Workshops  [] Attend 1:1   [] Attend Cooking Classes  [] ** Vlad's nutritional goals are as follows:  [] Attend Nutrition Workshops  [] Attend 1:1   [] Attend Cooking Classes  [] ** Vlad achieved nutritional goals   [] Yes    [] No  If no, why?   Use knowledge gained to continue Pritikin eating plan at home     Individual Cardiac Treatment Plan - Psychosocial  PSYCHOSOCIAL  ASSESSMENT/PLAN PSYCHOSOCIAL  REASSESSMENT PSYCHOSOCIAL   REASSESSMENT PSYCHOSOCIAL   REASSESSMENT PSYCHOSOCIAL   REASSESSMENT PSYCHOSOCIAL  DISCHARGE/FOLLOW-UP   Stages of Change Stages of Change Stages of Change Stages of Change Stages of Change Stages of Skills *Please check if needed  [] Psych Consult  [] Physician Referral  [] Stress Management Skills *Please check if needed  [] Psych Consult  [] Physician Referral  [] Stress Management Skills   Is patient currently taking anti-depressant or anti-anxiety medications? [] Yes      [x] No Is patient currently taking anti-depressant or anti-anxiety medications? [] Yes      [] No  If yes, please list medications:  ** Is patient currently taking anti-depressant or anti-anxiety medications? [] Yes      [] No  If yes, please list medications:  ** Is patient currently taking anti-depressant or anti-anxiety medications? [] Yes      [] No  If yes, please list medications:  ** Is patient currently taking anti-depressant or anti-anxiety medications? [] Yes      [] No  If yes, please list medications:  ** Is patient currently taking anti-depressant or anti-anxiety medications?   [] Yes      [] No  If yes, please list medications:  **   *Education* *Education* *Education* *Education* *Education* *Education*   Healthy Mind-Set Workshops Recommended: 2    *Stress & Health  *Taking Charge of Stress  *New Thoughts, New Behaviors  Managing Moods & Relationships Healthy Mind-Set Workshops Attended/Date Healthy Mind-Set Workshops Attended/Date Healthy Mind-Set Workshops Attended/Date Healthy Mind-Set Workshops Attended/Date Healthy Mind-Set Workshops  Completed  [] Yes      [] No   *Goals* *Goals* *Goals* *Goals* *Goals* *Goals*   Vlad's psychosocial goals are as follows:  [x] Complete HADS & Bethanie & Victor Manuel, Quality of Life Index, Cardiac Version IV  [x] Attend Healthy Mind-Set Workshops  [x] Reduce depression symptom severity by 1 level Vlad's psychosocial goals are as follows:  [] Attend Healthy Mind-Set Workshops  [] Reduce depression symptom severity by 1 level  [] ** Rudy psychosocial goals are as follows:  [] Attend Healthy Mind-Set Workshops  [] Reduce depression symptom severity by 1 level  [] ** Vlad's psychosocial goals are as follows:  [] Attend Healthy Mind-Set Workshops  [] Reduce depression symptom severity by 1 level  [] ** Vlad's psychosocial goals are as follows:  [] Attend Healthy Mind-Set Workshops  [] Reduce depression symptom severity by 1 level  [] Tonja Mathias achieved psychosocial goals?   [] Yes    [] No  If no, why?  **  [] Use methods learned to continue to reduce depression symptom severity by 1 level  [] **           Individual Cardiac Treatment Plan - Other:  Risk Factor/Education  RISK FACTOR  ASSESSMENT/PLAN RISK FACTOR  REASSESSMENT  RISK FACTOR  REASSESSMENT RISK FACTOR  REASSESSMENT RISK FACTOR  REASSESSMENT RISK FACTOR   DISCHARGE/FOLLOW-UP   Stages of Change Stages of Change Stages of Change Stages of Change Stages of Change Stages of Change   [] Pre Contemplation  [x] Contemplation  [] Preparation  [] Action  [] Maintenance  [] Relapse [] Pre Contemplation  [] Contemplation  [] Preparation  [] Action  [] Maintenance  [] Relapse [] Pre Contemplation  [] Contemplation  [] Preparation  [] Action  [] Maintenance  [] Relapse [] Pre Contemplation  [] Contemplation  [] Preparation  [] Action  [] Maintenance  [] Relapse [] Pre Contemplation  [] Contemplation  [] Preparation  [] Action  [] Maintenance  [] Relapse [] Pre Contemplation  [] Contemplation  [] Preparation  [] Action  [] Maintenance  [] Relapse   RISK FACTOR/EDUCATION ASSESSMENT RISK FACTOR/EDUCATION ASSESSMENT RISK FACTOR/EDUCATION ASSESSMENT RISK FACTOR/EDUCATION ASSESSMENT RISK FACTOR/EDUCATION ASSESSMENT RISK FACTOR /EDUCATION ASSESSMENT   Hypertension  [] Yes      [x] No    Resting BP: 116/80  Peak Ex BP:124/86     Hypertension  Resting BP: **  Peak Ex BP:**  Medication Changes:  [] Yes      [] No Hypertension  Resting BP: **  Peak Ex BP:**  Medication Changes:  [] Yes      [] No Hypertension  Resting BP: **  Peak Ex BP:**  Medication Changes:  [] Yes      [] No Hypertension  Resting BP: **  Peak Ex BP:**  Medication Changes:  [] Yes      [] No Hypertension  Resting BP: **  Peak Ex BP:**  Medication Changes:  [] Yes      [] No   Lipids  HLD/DLD  [x] Yes      [] No  TOTAL CHOL: 119  HDL:  37  LDL:  59  TRI  Medication: atorvastatin (LIPITOR) Lipids  Medication Changes:  [] Yes      [] No     Lipids  Medication Changes:  [] Yes      [] No     Lipids  Medication Changes:  [] Yes      [] No     Lipids  Medication Changes:  [] Yes      [] No Lipids    TOTAL CHOL: **  HDL:  **  LDL:  **  TRIG:  **  Medication Changes:  [] Yes      [] No   Diabetes  [x] Yes      [] No  FBS: 226          HbA1C: 6.0        Monitor BS @ home:   [x] Yes      [] No  Frequency: twice a day   Medication: glimepiride (AMARYL) Diabetes  Most Recent BS:  BS have been in range  [] Yes      [] No  Medication Changes  [] Yes      [] No   Diabetes  Most Recent BS:  BS have been in range  [] Yes      [] No  Medication Changes  [] Yes      [] No     Diabetes  Most Recent BS:  BS have been in range  [] Yes      [] No  Medication Changes  [] Yes      [] No     Diabetes  Most Recent BS:  BS have been in range  [] Yes      [] No  Medication Changes  [] Yes      [] No Diabetes  Most Recent BS:  BS have been in range  [] Yes      [] No  Medication Changes  [] Yes      [] No       Tobacco Use  []Current  [x]Former  []Never    Date Quit:  Tobacco Use  Change in smoking status   [] Yes      [] No    Quit date: **   Tobacco Use  Change in smoking status   [] Yes      [] No    Quit date: **   Tobacco Use  Change in smoking status   [] Yes      [] No    Quit date: ** Tobacco Use  Change in smoking status   [] Yes      [] No    Quit date: ** Tobacco Use  Change in smoking status   [] Yes      [] No    Quit date: **             Learning Barriers  Please select one:  []Speech  []Literacy  []Hearing  []Cognitive  []Vision  [x]Ready to Learn Learning Barriers Addressed:   [] Yes      [] No   Learning Barriers Addressed:   [] Yes      [] No   Learning Barriers Addressed:  [] Yes      [] No Learning Barriers Addressed:  [] Yes      [] No Learning Barriers Addressed:  [] Yes      [] No     RISK FACTOR/EDUCATION PLAN RISK FACTOR/EDUCATION PLAN RISK FACTOR/EDUCATION PLAN RISK FACTOR/EDUCATION PLAN RISK FACTOR/EDUCATION PLAN RISK FACTOR/EDUCATION PLAN   *Interventions* *Interventions* *Interventions* *Interventions* *Interventions* *Interventions*   Core Educational Videos    [] Overview of The Pritikin Eating Plan  [] Heart Disease Risk Reduction  [] Move It! [] Calorie Density  [] Healthy Minds, Bodies, Hearts  [] Label Reading  [] Metabolic Syndrome & Belly   Or  [] How Our Thoughts Can Heal our Heart  [] Dining Out-Part 1  [] Biomechanical Limitations  [] Facts on Fat  [] Hypertension & Heart Disease  [] Diseases of Our Times-Focusing on Diabetes  [] Body Composition  [] Nurtition Action Plan  [] Exercise Action Plan     Completed Videos/Date      9/20/21  Overview of The Pritikin Eating Plan Completed Videos/Date Completed Videos/Date Completed Videos/Date Recommended Educational Videos Completed    [] Yes      [] No    **If not completed, Why? **           Smoking Cessation/Relaspe Prevention Intervention needed? [] Yes      [x] No    Smoking Cessation/Relapse Prevention Scheduled? [] Yes      [] No  Date:  ** Smoking Cessation/Relapse Prevention Scheduled? [] Yes      [] No  Date:  ** Smoking Cessation/Relapse Prevention Scheduled? [] Yes      [] No  Date:  ** Smoking Cessation/Relapse Prevention Scheduled?    [] Yes      [] No  Date:  ** Smoking Cessation Counseling attended  [] Yes      [] No  **If not completed, Why? **   Professional Referrals:  *Please check if needed  [] Diabetes Clinic  [] Lipid Clinic   [] Other:      Professional Referrals:  *Please check if needed  [] Diabetes Clinic  [] Lipid Clinic   [] Other:   Preventative Medication Preventative Medication Preventative Medication Preventative Medication Preventative Medication Preventative Medication   Aspirin  [x] Yes    [] No  Blood Thinner: Clopidogrel/Effient/Brillinta  [x] Yes    [] No  Beta Blocker  [x] Yes    [] No  Ace Inhibitor  [] Yes    [x] No  Statin/Lipid Lowering  [x] Yes    [] No Medication Changes? [] Yes    [] No Medication Changes? [] Yes    [] No Medication Changes? [] Yes    [] No Medication Changes? [] Yes    [] No Medication Changes? [] Yes    [] No   *Education* *Education* *Education* *Education* *Education* *Education*   Does Alonso Deisy require any additional education? []Yes    [x]No   Does Alonso Deisy require any additional education? [] Yes    [] No Does Alonso Deisy require any additional education? [] Yes    [] No Does Alonso Deisy require any additional education? []  Yes    [] No Does Alonso Deisy require any additional education? []  Yes    [] No Does Alonso Deisy require any additional education?   [] Yes    [] No   Additional Educational Videos    Exercise  [] Improve Performance    Medical  [] Aging Enhancing Your QoL  [] Biology of Weight Control  [] Decoding Lab Results  [] Diseases of Our Time - Overview  [] Sleep Disorders    Nutrition  [] Dining Out - Part 2  [] Fueling a Healthy Body  [] Menu Workshop  [] Planning Your Eating Strategy  [] Targeting Your Nutrition Priorities  [] Vitamins & Minerals    Healthy Mind-Set  [] Smoking Cessation    Culinary  []Becoming a Pritikin    [] Cooking - Breakfast & Snacks  [] Cooking -Healhty Salads & Dressing  [] Cooking -Dinner & Sides  [] Cooking -Soups & Desserts    Overview  [] The Pritikin Solution   Additional Educational Videos Completed           Additional Educational Videos Completed Additional Educational Videos Completed Additional Educational Videos Completed Additional Educational Videos Completed    [] Yes    [] No   *Goals* *Goals* *Goals* *Goals* *Goals* *Goals*   Rudy risk factor/education goals are as follows:    [x] Optimal BP <140/90  [x] Blood Sugar <120  [x] Attend recommended video education sessions  [x] Takes medications as prescribed 100% of the time    Rudy risk factor/education goals are as follows:    [x] Optimal BP <140/90  [] Blood Sugar <120  [x] Attend recommended video education sessions  [x] Takes medications as prescribed 100% of the time   [] ** Vlad's risk factor/education goals are as follows:    [x] Optimal BP <140/90  [] Blood Sugar <120  [x] Attend recommended video education sessions  [x] Takes medications as prescribed 100% of the time   [] ** Vlad's risk factor/education goals are as follows:    [x] Optimal BP <140/90  [] Blood Sugar <120  [x] Attend recommended video education sessions  [x] Takes medications as prescribed 100% of the time   [] ** Vlad's risk factor/education goals are as follows:    [x] Optimal BP <140/90  [] Blood Sugar <120  [x] Attend recommended video education sessions  [x] Takes medications as prescribed 100% of the time   [] Doll Rias achieved risk factor goals?   [] Yes    [] No  If no, why?  **               Monitored telemetry has revealed NSR   Monitored telemetry has revealed **  [] documented arrhythmia at increasing workloads  [] associated symptoms ** Monitored telemetry has revealed  [] documented arrhythmia at increasing workloads  [] associated symptoms ** Monitored telemetry has revealed **  [] documented arrhythmia at increasing workloads  [] associated symptoms ** Monitored telemetry has revealed **  [] documented arrhythmia at increasing workloads  [] associated symptoms ** Monitored telemetry has revealed **  [] documented arrhythmia at increasing workloads  [] associated symptoms **   Physician Response    [x] Cardiac rehab is reasonably and medically necessary for continuous cardiac monitoring surveillance  of patient's cardiac activity  [x] Initiate continuous telemetry monitoring and notify me with any concerns  [] Other   Physician Response    [x] Cardiac rehab is reasonably and medically necessary for continuous cardiac monitoring surveillance  of patient's cardiac activity  [x] Continue continuous telemetry monitoring and notify me with any concerns  [] Other     Physician Response    [x] Cardiac rehab is reasonably and medically necessary for continuous cardiac monitoring surveillance  of patient's cardiac activity  [x] Continue continuous telemetry monitoring and notify me with any concerns   [] Other     Physician Response    [x] Cardiac rehab is reasonably and medically necessary for continuous cardiac monitoring surveillance  of patient's cardiac activity  [x] Continue continuous telemetry monitoring and notify me with any concerns   [] Other   Physician Response    [x] Cardiac rehab is reasonably and medically necessary for continuous cardiac monitoring surveillance  of patient's cardiac activity  [x] Continue continuous telemetry monitoring and notify me with any concerns   [] Other      Cosigned by: Myah Yarbrough MD at 9/21/2021  9:36 AM   Revision History  Date/Time User Provider Type Action   9/21/2021  9:36 AM Myah Yarbrough MD Physician Cosign   9/20/2021 12:50 PM Enrike Landers Exercise Physiologist Sign

## 2021-10-15 ENCOUNTER — HOSPITAL ENCOUNTER (EMERGENCY)
Age: 56
Discharge: HOME | End: 2021-10-15
Payer: MEDICARE

## 2021-10-15 ENCOUNTER — OFFICE VISIT (OUTPATIENT)
Dept: CARDIOLOGY CLINIC | Age: 56
End: 2021-10-15
Payer: MEDICARE

## 2021-10-15 VITALS
SYSTOLIC BLOOD PRESSURE: 152 MMHG | WEIGHT: 248 LBS | HEART RATE: 72 BPM | DIASTOLIC BLOOD PRESSURE: 75 MMHG | RESPIRATION RATE: 16 BRPM | TEMPERATURE: 98.9 F | HEIGHT: 71 IN | OXYGEN SATURATION: 98 % | BODY MASS INDEX: 34.72 KG/M2

## 2021-10-15 VITALS
HEART RATE: 76 BPM | HEIGHT: 71 IN | SYSTOLIC BLOOD PRESSURE: 160 MMHG | DIASTOLIC BLOOD PRESSURE: 92 MMHG | BODY MASS INDEX: 34.1 KG/M2 | WEIGHT: 243.6 LBS

## 2021-10-15 DIAGNOSIS — I25.10 CORONARY ARTERY DISEASE INVOLVING NATIVE CORONARY ARTERY OF NATIVE HEART WITHOUT ANGINA PECTORIS: ICD-10-CM

## 2021-10-15 DIAGNOSIS — E78.01 FAMILIAL HYPERCHOLESTEROLEMIA: ICD-10-CM

## 2021-10-15 DIAGNOSIS — R21 RASH: ICD-10-CM

## 2021-10-15 DIAGNOSIS — R21 RASH AND OTHER NONSPECIFIC SKIN ERUPTION: Primary | ICD-10-CM

## 2021-10-15 DIAGNOSIS — I10 PRIMARY HYPERTENSION: Primary | ICD-10-CM

## 2021-10-15 LAB
ALBUMIN SERPL-MCNC: 3.9 G/DL (ref 3.5–5.1)
ALP BLD-CCNC: 150 U/L (ref 38–126)
ALT SERPL-CCNC: 14 U/L (ref 11–66)
ANION GAP SERPL CALCULATED.3IONS-SCNC: 10 MEQ/L (ref 8–16)
AST SERPL-CCNC: 11 U/L (ref 5–40)
BASOPHILS # BLD: 0.7 %
BASOPHILS ABSOLUTE: 0.1 THOU/MM3 (ref 0–0.1)
BILIRUB SERPL-MCNC: 0.6 MG/DL (ref 0.3–1.2)
BILIRUBIN DIRECT: < 0.2 MG/DL (ref 0–0.3)
BUN BLDV-MCNC: 23 MG/DL (ref 7–22)
CALCIUM SERPL-MCNC: 9.1 MG/DL (ref 8.5–10.5)
CHLORIDE BLD-SCNC: 104 MEQ/L (ref 98–111)
CO2: 26 MEQ/L (ref 23–33)
CREAT SERPL-MCNC: 0.9 MG/DL (ref 0.4–1.2)
EOSINOPHIL # BLD: 14.7 %
EOSINOPHILS ABSOLUTE: 1.5 THOU/MM3 (ref 0–0.4)
ERYTHROCYTE [DISTWIDTH] IN BLOOD BY AUTOMATED COUNT: 14.2 % (ref 11.5–14.5)
ERYTHROCYTE [DISTWIDTH] IN BLOOD BY AUTOMATED COUNT: 48.9 FL (ref 35–45)
GFR SERPL CREATININE-BSD FRML MDRD: 87 ML/MIN/1.73M2
GLUCOSE BLD-MCNC: 129 MG/DL (ref 70–108)
HCT VFR BLD CALC: 44.3 % (ref 42–52)
HEMOGLOBIN: 14 GM/DL (ref 14–18)
IMMATURE GRANS (ABS): 0.06 THOU/MM3 (ref 0–0.07)
IMMATURE GRANULOCYTES: 0.6 %
LYMPHOCYTES # BLD: 17.1 %
LYMPHOCYTES ABSOLUTE: 1.7 THOU/MM3 (ref 1–4.8)
MCH RBC QN AUTO: 30.2 PG (ref 26–33)
MCHC RBC AUTO-ENTMCNC: 31.6 GM/DL (ref 32.2–35.5)
MCV RBC AUTO: 95.7 FL (ref 80–94)
MONOCYTES # BLD: 6.4 %
MONOCYTES ABSOLUTE: 0.7 THOU/MM3 (ref 0.4–1.3)
NUCLEATED RED BLOOD CELLS: 0 /100 WBC
OSMOLALITY CALCULATION: 284.8 MOSMOL/KG (ref 275–300)
PLATELET # BLD: 255 THOU/MM3 (ref 130–400)
PMV BLD AUTO: 9.7 FL (ref 9.4–12.4)
POTASSIUM SERPL-SCNC: 4.5 MEQ/L (ref 3.5–5.2)
PRO-BNP: 194.9 PG/ML (ref 0–900)
RBC # BLD: 4.63 MILL/MM3 (ref 4.7–6.1)
SEG NEUTROPHILS: 60.5 %
SEGMENTED NEUTROPHILS ABSOLUTE COUNT: 6.2 THOU/MM3 (ref 1.8–7.7)
SODIUM BLD-SCNC: 140 MEQ/L (ref 135–145)
TOTAL PROTEIN: 6.5 G/DL (ref 6.1–8)
WBC # BLD: 10.2 THOU/MM3 (ref 4.8–10.8)

## 2021-10-15 PROCEDURE — 80053 COMPREHEN METABOLIC PANEL: CPT

## 2021-10-15 PROCEDURE — 83880 ASSAY OF NATRIURETIC PEPTIDE: CPT

## 2021-10-15 PROCEDURE — 87205 SMEAR GRAM STAIN: CPT

## 2021-10-15 PROCEDURE — 87075 CULTR BACTERIA EXCEPT BLOOD: CPT

## 2021-10-15 PROCEDURE — 99282 EMERGENCY DEPT VISIT SF MDM: CPT

## 2021-10-15 PROCEDURE — 96372 THER/PROPH/DIAG INJ SC/IM: CPT

## 2021-10-15 PROCEDURE — 6360000002 HC RX W HCPCS: Performed by: NURSE PRACTITIONER

## 2021-10-15 PROCEDURE — 36415 COLL VENOUS BLD VENIPUNCTURE: CPT

## 2021-10-15 PROCEDURE — 87147 CULTURE TYPE IMMUNOLOGIC: CPT

## 2021-10-15 PROCEDURE — 87186 SC STD MICRODIL/AGAR DIL: CPT

## 2021-10-15 PROCEDURE — 87077 CULTURE AEROBIC IDENTIFY: CPT

## 2021-10-15 PROCEDURE — 87070 CULTURE OTHR SPECIMN AEROBIC: CPT

## 2021-10-15 PROCEDURE — 85025 COMPLETE CBC W/AUTO DIFF WBC: CPT

## 2021-10-15 PROCEDURE — 99214 OFFICE O/P EST MOD 30 MIN: CPT | Performed by: NUCLEAR MEDICINE

## 2021-10-15 PROCEDURE — 82248 BILIRUBIN DIRECT: CPT

## 2021-10-15 RX ORDER — LOSARTAN POTASSIUM 25 MG/1
25 TABLET ORAL DAILY
COMMUNITY
End: 2021-10-15

## 2021-10-15 RX ORDER — FUROSEMIDE 20 MG/1
20 TABLET ORAL 2 TIMES DAILY
COMMUNITY
End: 2021-10-15

## 2021-10-15 RX ORDER — LOSARTAN POTASSIUM 25 MG/1
25 TABLET ORAL DAILY
Qty: 90 TABLET | Refills: 3 | Status: CANCELLED | OUTPATIENT
Start: 2021-10-15

## 2021-10-15 RX ORDER — HYDROXYZINE HYDROCHLORIDE 50 MG/ML
50 INJECTION, SOLUTION INTRAMUSCULAR ONCE
Status: COMPLETED | OUTPATIENT
Start: 2021-10-15 | End: 2021-10-15

## 2021-10-15 RX ORDER — DOXYCYCLINE HYCLATE 100 MG
100 TABLET ORAL 2 TIMES DAILY
Qty: 20 TABLET | Refills: 0 | Status: SHIPPED | OUTPATIENT
Start: 2021-10-15 | End: 2021-10-25

## 2021-10-15 RX ORDER — LOSARTAN POTASSIUM AND HYDROCHLOROTHIAZIDE 12.5; 5 MG/1; MG/1
1 TABLET ORAL DAILY
Qty: 90 TABLET | Refills: 3 | Status: SHIPPED | OUTPATIENT
Start: 2021-10-15

## 2021-10-15 RX ORDER — OMEPRAZOLE 40 MG/1
CAPSULE, DELAYED RELEASE ORAL
COMMUNITY
Start: 2021-07-30 | End: 2022-04-08

## 2021-10-15 RX ORDER — CLINDAMYCIN HYDROCHLORIDE 300 MG/1
CAPSULE ORAL
COMMUNITY
Start: 2021-10-08 | End: 2022-04-08

## 2021-10-15 RX ORDER — LOSARTAN POTASSIUM AND HYDROCHLOROTHIAZIDE 12.5; 5 MG/1; MG/1
1 TABLET ORAL DAILY
COMMUNITY
End: 2021-10-15 | Stop reason: SDUPTHER

## 2021-10-15 RX ADMIN — HYDROXYZINE HYDROCHLORIDE 50 MG: 50 INJECTION, SOLUTION INTRAMUSCULAR at 16:03

## 2021-10-15 ASSESSMENT — ENCOUNTER SYMPTOMS
BLOOD IN STOOL: 0
WHEEZING: 0
DIARRHEA: 0
VOMITING: 0
SINUS PRESSURE: 0
RHINORRHEA: 0
CONSTIPATION: 0
COUGH: 0
EYE PAIN: 0
NAUSEA: 0
ABDOMINAL PAIN: 0
SHORTNESS OF BREATH: 0

## 2021-10-15 ASSESSMENT — PAIN DESCRIPTION - LOCATION: LOCATION: LEG

## 2021-10-15 ASSESSMENT — PAIN DESCRIPTION - PAIN TYPE: TYPE: ACUTE PAIN

## 2021-10-15 ASSESSMENT — PAIN DESCRIPTION - ORIENTATION: ORIENTATION: LEFT

## 2021-10-15 ASSESSMENT — PAIN SCALES - GENERAL: PAINLEVEL_OUTOF10: 8

## 2021-10-15 NOTE — ED TRIAGE NOTES
Patient presents to ER with complaints of rash and allergic reaction that started after heart stents and new medication. Patient complains of bilateral leg swelling and ongoing itching.
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
## 2021-10-20 LAB
AEROBIC CULTURE: ABNORMAL
ANAEROBIC CULTURE: ABNORMAL
GRAM STAIN RESULT: ABNORMAL
ORGANISM: ABNORMAL

## 2021-10-21 NOTE — PROGRESS NOTES
Pharmacy Note  ED Culture Follow-up    Yady Cervantes is a 60 y.o. male. Allergies: Bactrim [sulfamethoxazole-trimethoprim], Lasix [furosemide], Rocephin [ceftriaxone], and Brilinta [ticagrelor]     Labs:  Lab Results   Component Value Date    BUN 23 (H) 10/15/2021    CREATININE 0.9 10/15/2021    WBC 10.2 10/15/2021     Estimated Creatinine Clearance: 114 mL/min (based on SCr of 0.9 mg/dL). Current antimicrobials:   Doxycycline 100 mg BID x 10 days    ASSESSMENT:  Micro results:   Wound culture: positive for S. aureus     PLAN:  Need for intervention: No  Discussed with: Tej Potts MD  Chosen treatment:    Patient already on appropriate treatment as above    Patient response:   No need to contact patient    Called/sent in prescription to: Not applicable    Please call with any questions.  [address] [facility], PharmD 4:33 PM 10/21/2021

## 2021-10-23 ENCOUNTER — APPOINTMENT (OUTPATIENT)
Dept: GENERAL RADIOLOGY | Age: 56
End: 2021-10-23
Payer: MEDICARE

## 2021-10-23 ENCOUNTER — APPOINTMENT (OUTPATIENT)
Dept: INTERVENTIONAL RADIOLOGY/VASCULAR | Age: 56
End: 2021-10-23
Payer: MEDICARE

## 2021-10-23 ENCOUNTER — HOSPITAL ENCOUNTER (EMERGENCY)
Age: 56
Discharge: HOME | End: 2021-10-23
Payer: MEDICARE

## 2021-10-23 VITALS
HEART RATE: 74 BPM | RESPIRATION RATE: 18 BRPM | SYSTOLIC BLOOD PRESSURE: 155 MMHG | BODY MASS INDEX: 33.32 KG/M2 | WEIGHT: 238 LBS | TEMPERATURE: 97.9 F | OXYGEN SATURATION: 100 % | HEIGHT: 71 IN | DIASTOLIC BLOOD PRESSURE: 74 MMHG

## 2021-10-23 DIAGNOSIS — I87.8 VENOUS STASIS: ICD-10-CM

## 2021-10-23 DIAGNOSIS — I20.8 ANGINA OF EFFORT (HCC): ICD-10-CM

## 2021-10-23 DIAGNOSIS — R12 HEARTBURN: ICD-10-CM

## 2021-10-23 DIAGNOSIS — L30.9 DERMATITIS: ICD-10-CM

## 2021-10-23 DIAGNOSIS — I82.402 DEEP VEIN THROMBOSIS (DVT) OF LEFT LOWER EXTREMITY, UNSPECIFIED CHRONICITY, UNSPECIFIED VEIN (HCC): ICD-10-CM

## 2021-10-23 DIAGNOSIS — I10 ESSENTIAL HYPERTENSION: ICD-10-CM

## 2021-10-23 DIAGNOSIS — I25.10 CAD IN NATIVE ARTERY: ICD-10-CM

## 2021-10-23 DIAGNOSIS — I20.0 UNSTABLE ANGINA (HCC): ICD-10-CM

## 2021-10-23 DIAGNOSIS — I95.1 ORTHOSTATIC HYPOTENSION: ICD-10-CM

## 2021-10-23 DIAGNOSIS — Z98.890 S/P CARDIAC CATH: ICD-10-CM

## 2021-10-23 DIAGNOSIS — N17.9 ACUTE KIDNEY INJURY (HCC): ICD-10-CM

## 2021-10-23 DIAGNOSIS — I82.401 DEEP VEIN THROMBOSIS (DVT) OF RIGHT LOWER EXTREMITY, UNSPECIFIED CHRONICITY, UNSPECIFIED VEIN (HCC): ICD-10-CM

## 2021-10-23 DIAGNOSIS — B36.9 FUNGAL DERMATITIS: Primary | ICD-10-CM

## 2021-10-23 LAB
ALBUMIN SERPL-MCNC: 3.9 G/DL (ref 3.5–5.1)
ALP BLD-CCNC: 145 U/L (ref 38–126)
ALT SERPL-CCNC: 14 U/L (ref 11–66)
ANION GAP SERPL CALCULATED.3IONS-SCNC: 9 MEQ/L (ref 8–16)
AST SERPL-CCNC: 11 U/L (ref 5–40)
BASOPHILS # BLD: 0.6 %
BASOPHILS ABSOLUTE: 0.1 THOU/MM3 (ref 0–0.1)
BILIRUB SERPL-MCNC: 0.7 MG/DL (ref 0.3–1.2)
BUN BLDV-MCNC: 21 MG/DL (ref 7–22)
CALCIUM SERPL-MCNC: 9.8 MG/DL (ref 8.5–10.5)
CHLORIDE BLD-SCNC: 105 MEQ/L (ref 98–111)
CO2: 27 MEQ/L (ref 23–33)
CREAT SERPL-MCNC: 0.9 MG/DL (ref 0.4–1.2)
EOSINOPHIL # BLD: 22.1 %
EOSINOPHILS ABSOLUTE: 2.4 THOU/MM3 (ref 0–0.4)
ERYTHROCYTE [DISTWIDTH] IN BLOOD BY AUTOMATED COUNT: 14 % (ref 11.5–14.5)
ERYTHROCYTE [DISTWIDTH] IN BLOOD BY AUTOMATED COUNT: 48.2 FL (ref 35–45)
GFR SERPL CREATININE-BSD FRML MDRD: 87 ML/MIN/1.73M2
GLUCOSE BLD-MCNC: 166 MG/DL (ref 70–108)
HCT VFR BLD CALC: 43.4 % (ref 42–52)
HEMOGLOBIN: 14.1 GM/DL (ref 14–18)
IMMATURE GRANS (ABS): 0.04 THOU/MM3 (ref 0–0.07)
IMMATURE GRANULOCYTES: 0.4 %
LACTIC ACID, SEPSIS: 1.7 MMOL/L (ref 0.5–1.9)
LYMPHOCYTES # BLD: 14.3 %
LYMPHOCYTES ABSOLUTE: 1.5 THOU/MM3 (ref 1–4.8)
MCH RBC QN AUTO: 30.7 PG (ref 26–33)
MCHC RBC AUTO-ENTMCNC: 32.5 GM/DL (ref 32.2–35.5)
MCV RBC AUTO: 94.6 FL (ref 80–94)
MONOCYTES # BLD: 5.8 %
MONOCYTES ABSOLUTE: 0.6 THOU/MM3 (ref 0.4–1.3)
NUCLEATED RED BLOOD CELLS: 0 /100 WBC
PLATELET # BLD: 267 THOU/MM3 (ref 130–400)
PLATELET ESTIMATE: ADEQUATE
PMV BLD AUTO: 9.4 FL (ref 9.4–12.4)
POTASSIUM REFLEX MAGNESIUM: 4.4 MEQ/L (ref 3.5–5.2)
PROCALCITONIN: 0.1 NG/ML (ref 0.01–0.09)
RBC # BLD: 4.59 MILL/MM3 (ref 4.7–6.1)
SCAN OF BLOOD SMEAR: NORMAL
SEG NEUTROPHILS: 56.8 %
SEGMENTED NEUTROPHILS ABSOLUTE COUNT: 6.1 THOU/MM3 (ref 1.8–7.7)
SODIUM BLD-SCNC: 141 MEQ/L (ref 135–145)
TOTAL PROTEIN: 6.6 G/DL (ref 6.1–8)
WBC # BLD: 10.8 THOU/MM3 (ref 4.8–10.8)

## 2021-10-23 PROCEDURE — 93970 EXTREMITY STUDY: CPT

## 2021-10-23 PROCEDURE — 87040 BLOOD CULTURE FOR BACTERIA: CPT

## 2021-10-23 PROCEDURE — 83605 ASSAY OF LACTIC ACID: CPT

## 2021-10-23 PROCEDURE — 99282 EMERGENCY DEPT VISIT SF MDM: CPT

## 2021-10-23 PROCEDURE — 73590 X-RAY EXAM OF LOWER LEG: CPT

## 2021-10-23 PROCEDURE — 36415 COLL VENOUS BLD VENIPUNCTURE: CPT

## 2021-10-23 PROCEDURE — 85025 COMPLETE CBC W/AUTO DIFF WBC: CPT

## 2021-10-23 PROCEDURE — 80053 COMPREHEN METABOLIC PANEL: CPT

## 2021-10-23 PROCEDURE — 6370000000 HC RX 637 (ALT 250 FOR IP): Performed by: NURSE PRACTITIONER

## 2021-10-23 PROCEDURE — 84145 PROCALCITONIN (PCT): CPT

## 2021-10-23 RX ORDER — HYDROXYZINE HYDROCHLORIDE 25 MG/1
50 TABLET, FILM COATED ORAL ONCE
Status: COMPLETED | OUTPATIENT
Start: 2021-10-23 | End: 2021-10-23

## 2021-10-23 RX ORDER — HYDROXYZINE HYDROCHLORIDE 50 MG/ML
50 INJECTION, SOLUTION INTRAMUSCULAR ONCE
Status: DISCONTINUED | OUTPATIENT
Start: 2021-10-23 | End: 2021-10-23

## 2021-10-23 RX ORDER — SODIUM CHLORIDE 9 MG/ML
1000 INJECTION, SOLUTION INTRAVENOUS CONTINUOUS
Status: DISCONTINUED | OUTPATIENT
Start: 2021-10-23 | End: 2021-10-23

## 2021-10-23 RX ORDER — HYDROXYZINE 50 MG/1
50 TABLET, FILM COATED ORAL EVERY 8 HOURS PRN
Qty: 30 TABLET | Refills: 0 | Status: SHIPPED | OUTPATIENT
Start: 2021-10-23 | End: 2021-11-02

## 2021-10-23 RX ORDER — FLUCONAZOLE 150 MG/1
150 TABLET ORAL WEEKLY
Qty: 4 TABLET | Refills: 0 | Status: SHIPPED | OUTPATIENT
Start: 2021-10-23 | End: 2021-11-14

## 2021-10-23 RX ORDER — CIPROFLOXACIN 2 MG/ML
400 INJECTION, SOLUTION INTRAVENOUS ONCE
Status: DISCONTINUED | OUTPATIENT
Start: 2021-10-23 | End: 2021-10-23

## 2021-10-23 RX ADMIN — HYDROXYZINE HYDROCHLORIDE 50 MG: 25 TABLET ORAL at 13:08

## 2021-10-23 ASSESSMENT — ENCOUNTER SYMPTOMS
EYE PAIN: 0
BLOOD IN STOOL: 0
RHINORRHEA: 0
VOMITING: 0
ABDOMINAL PAIN: 0
DIARRHEA: 0
SHORTNESS OF BREATH: 0
SINUS PRESSURE: 0
COLOR CHANGE: 1
CONSTIPATION: 0
COUGH: 0
WHEEZING: 0
NAUSEA: 0

## 2021-10-23 ASSESSMENT — PAIN DESCRIPTION - LOCATION: LOCATION: LEG

## 2021-10-23 ASSESSMENT — PAIN DESCRIPTION - PAIN TYPE: TYPE: ACUTE PAIN

## 2021-10-23 ASSESSMENT — PAIN SCALES - GENERAL: PAINLEVEL_OUTOF10: 8

## 2021-10-23 ASSESSMENT — PAIN DESCRIPTION - ORIENTATION: ORIENTATION: RIGHT;LEFT
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
## 2021-10-23 NOTE — ED NOTES
Pt resting in bed with side rails up 2x2 and call light in reach. Vital signs assessed and stable. IV site checked. Pt updated on plan of care. Pt voiced understanding. Pt verbalized no other needs or concerns at this time.         Re Rivera RN  10/23/21 2626
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
## 2021-10-28 LAB
BLOOD CULTURE, ROUTINE: NORMAL
BLOOD CULTURE, ROUTINE: NORMAL

## 2022-04-08 ENCOUNTER — OFFICE VISIT (OUTPATIENT)
Dept: CARDIOLOGY CLINIC | Age: 57
End: 2022-04-08
Payer: MEDICARE

## 2022-04-08 VITALS
DIASTOLIC BLOOD PRESSURE: 88 MMHG | SYSTOLIC BLOOD PRESSURE: 184 MMHG | HEIGHT: 71 IN | WEIGHT: 259 LBS | BODY MASS INDEX: 36.26 KG/M2 | HEART RATE: 66 BPM

## 2022-04-08 DIAGNOSIS — E78.01 FAMILIAL HYPERCHOLESTEROLEMIA: ICD-10-CM

## 2022-04-08 DIAGNOSIS — I10 PRIMARY HYPERTENSION: ICD-10-CM

## 2022-04-08 DIAGNOSIS — I25.119 CORONARY ARTERY DISEASE INVOLVING NATIVE CORONARY ARTERY OF NATIVE HEART WITH ANGINA PECTORIS (HCC): Primary | ICD-10-CM

## 2022-04-08 PROCEDURE — 99214 OFFICE O/P EST MOD 30 MIN: CPT | Performed by: NUCLEAR MEDICINE

## 2022-04-08 RX ORDER — ATORVASTATIN CALCIUM 40 MG/1
40 TABLET, FILM COATED ORAL NIGHTLY
COMMUNITY

## 2022-04-08 RX ORDER — IBUPROFEN 400 MG/1
400 TABLET ORAL PRN
COMMUNITY
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
## 2022-04-29 ENCOUNTER — TELEPHONE (OUTPATIENT)
Dept: CARDIOLOGY CLINIC | Age: 57
End: 2022-04-29

## 2022-11-21 RX ORDER — LOSARTAN POTASSIUM AND HYDROCHLOROTHIAZIDE 12.5; 5 MG/1; MG/1
TABLET ORAL
Qty: 90 TABLET | Refills: 0 | Status: SHIPPED | OUTPATIENT
Start: 2022-11-21

## 2022-12-09 ENCOUNTER — OFFICE VISIT (OUTPATIENT)
Dept: CARDIOLOGY CLINIC | Age: 57
End: 2022-12-09
Payer: MEDICARE

## 2022-12-09 VITALS
WEIGHT: 266.5 LBS | SYSTOLIC BLOOD PRESSURE: 155 MMHG | BODY MASS INDEX: 37.31 KG/M2 | HEART RATE: 72 BPM | DIASTOLIC BLOOD PRESSURE: 80 MMHG | HEIGHT: 71 IN

## 2022-12-09 DIAGNOSIS — R06.02 SHORTNESS OF BREATH: Primary | ICD-10-CM

## 2022-12-09 DIAGNOSIS — I25.10 CORONARY ARTERY DISEASE INVOLVING NATIVE CORONARY ARTERY OF NATIVE HEART WITHOUT ANGINA PECTORIS: ICD-10-CM

## 2022-12-09 DIAGNOSIS — E78.01 FAMILIAL HYPERCHOLESTEROLEMIA: ICD-10-CM

## 2022-12-09 PROCEDURE — 93000 ELECTROCARDIOGRAM COMPLETE: CPT | Performed by: NUCLEAR MEDICINE

## 2022-12-09 PROCEDURE — 3074F SYST BP LT 130 MM HG: CPT | Performed by: NUCLEAR MEDICINE

## 2022-12-09 PROCEDURE — 3078F DIAST BP <80 MM HG: CPT | Performed by: NUCLEAR MEDICINE

## 2022-12-09 PROCEDURE — 99213 OFFICE O/P EST LOW 20 MIN: CPT | Performed by: NUCLEAR MEDICINE

## 2022-12-09 RX ORDER — LOSARTAN POTASSIUM AND HYDROCHLOROTHIAZIDE 12.5; 5 MG/1; MG/1
TABLET ORAL
Qty: 90 TABLET | Refills: 3 | Status: SHIPPED | OUTPATIENT
Start: 2022-12-09
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
## 2023-12-08 ENCOUNTER — OFFICE VISIT (OUTPATIENT)
Dept: CARDIOLOGY CLINIC | Age: 58
End: 2023-12-08
Payer: MEDICARE

## 2023-12-08 VITALS
BODY MASS INDEX: 37.32 KG/M2 | HEART RATE: 71 BPM | DIASTOLIC BLOOD PRESSURE: 92 MMHG | HEIGHT: 71 IN | WEIGHT: 266.6 LBS | SYSTOLIC BLOOD PRESSURE: 154 MMHG

## 2023-12-08 DIAGNOSIS — I25.10 CORONARY ARTERY DISEASE INVOLVING NATIVE CORONARY ARTERY OF NATIVE HEART WITHOUT ANGINA PECTORIS: Primary | ICD-10-CM

## 2023-12-08 DIAGNOSIS — I10 PRIMARY HYPERTENSION: ICD-10-CM

## 2023-12-08 DIAGNOSIS — E78.01 FAMILIAL HYPERCHOLESTEROLEMIA: ICD-10-CM

## 2023-12-08 PROCEDURE — 99214 OFFICE O/P EST MOD 30 MIN: CPT | Performed by: NUCLEAR MEDICINE

## 2023-12-08 PROCEDURE — 93000 ELECTROCARDIOGRAM COMPLETE: CPT | Performed by: NUCLEAR MEDICINE

## 2023-12-08 PROCEDURE — 3077F SYST BP >= 140 MM HG: CPT | Performed by: NUCLEAR MEDICINE

## 2023-12-08 PROCEDURE — 3080F DIAST BP >= 90 MM HG: CPT | Performed by: NUCLEAR MEDICINE

## 2023-12-08 NOTE — PROGRESS NOTES
Reports slight ache on left side of chest when he goes in the cold and comes back in. Denies swelling in bilateral lower extremities. Denies and SOB. Med list up to date and pharmacy verified.
encounter. Discussed use, benefit, and side effects of prescribed medications. All patient questions answered. Pt voicedunderstanding. Instructed to continue current medications, diet and exercise. Continue risk factor modification and medical management. Patient agreed with treatment plan. Follow up as directed.     Electronically signedby Brianda Carrillo MD on 12/8/2023 at 11:58 AM
19

## 2024-01-04 ENCOUNTER — TELEPHONE (OUTPATIENT)
Dept: CARDIOLOGY CLINIC | Age: 59
End: 2024-01-04

## 2024-01-04 NOTE — TELEPHONE ENCOUNTER
Called and spoke to wife Argelia. Reports his blood pressure systolic is now up  in the high 90s. Will hold BP meds and monitor BP. Stressed the importance if the pressure remains low to present to the ED for possible IVF. Wife voiced understanding.

## 2024-01-04 NOTE — TELEPHONE ENCOUNTER
Pt wife called states they have been sick for about a week, better today, but pb is low 65/53 and littler later 86/64. Very light headed feeling of passing out. Pt reached out to pcp office too. Asking since Dr. Jaimes is out of office can anyone help?

## 2024-02-27 RX ORDER — LOSARTAN POTASSIUM AND HYDROCHLOROTHIAZIDE 12.5; 5 MG/1; MG/1
1 TABLET ORAL DAILY
Qty: 90 TABLET | Refills: 3 | Status: SHIPPED | OUTPATIENT
Start: 2024-02-27

## 2024-04-17 RX ORDER — LOSARTAN POTASSIUM AND HYDROCHLOROTHIAZIDE 12.5; 5 MG/1; MG/1
1 TABLET ORAL DAILY
Qty: 90 TABLET | Refills: 3 | Status: SHIPPED | OUTPATIENT
Start: 2024-04-17

## 2024-10-22 ENCOUNTER — TELEPHONE (OUTPATIENT)
Dept: CARDIOLOGY CLINIC | Age: 59
End: 2024-10-22

## 2024-10-22 RX ORDER — LOSARTAN POTASSIUM 25 MG/1
25 TABLET ORAL DAILY
COMMUNITY

## 2024-10-22 RX ORDER — CARVEDILOL 3.12 MG/1
3.12 TABLET ORAL 2 TIMES DAILY WITH MEALS
COMMUNITY

## 2024-10-22 NOTE — TELEPHONE ENCOUNTER
Okay. Hold coreg and losartan till the BP improves  Okay to hold plavix for 5 days for colonoscopy

## 2024-10-22 NOTE — TELEPHONE ENCOUNTER
Pt's wife calling.  They have struggling with his very low BP for the lst couple months (80/50), seeing PCP every 2-3 weeks. Meds changes done.  Med list is up to date.      While all of this was happening.  He did Cologard.  It came back positive.   She is asking if he can hold Plavix x 5 days for a colonoscopy.

## 2024-12-11 ENCOUNTER — OFFICE VISIT (OUTPATIENT)
Dept: CARDIOLOGY CLINIC | Age: 59
End: 2024-12-11
Payer: MEDICARE

## 2024-12-11 VITALS
SYSTOLIC BLOOD PRESSURE: 158 MMHG | WEIGHT: 265 LBS | HEART RATE: 68 BPM | DIASTOLIC BLOOD PRESSURE: 86 MMHG | HEIGHT: 71 IN | BODY MASS INDEX: 37.1 KG/M2

## 2024-12-11 DIAGNOSIS — E78.01 FAMILIAL HYPERCHOLESTEROLEMIA: ICD-10-CM

## 2024-12-11 DIAGNOSIS — I25.10 CORONARY ARTERY DISEASE INVOLVING NATIVE CORONARY ARTERY OF NATIVE HEART WITHOUT ANGINA PECTORIS: Primary | ICD-10-CM

## 2024-12-11 DIAGNOSIS — I10 PRIMARY HYPERTENSION: ICD-10-CM

## 2024-12-11 PROCEDURE — 3079F DIAST BP 80-89 MM HG: CPT | Performed by: NUCLEAR MEDICINE

## 2024-12-11 PROCEDURE — 3077F SYST BP >= 140 MM HG: CPT | Performed by: NUCLEAR MEDICINE

## 2024-12-11 PROCEDURE — 99214 OFFICE O/P EST MOD 30 MIN: CPT | Performed by: NUCLEAR MEDICINE

## 2024-12-11 PROCEDURE — 93000 ELECTROCARDIOGRAM COMPLETE: CPT | Performed by: NUCLEAR MEDICINE

## 2024-12-11 RX ORDER — PIOGLITAZONE 15 MG/1
15 TABLET ORAL DAILY
COMMUNITY
Start: 2024-11-08

## 2024-12-11 NOTE — PROGRESS NOTES
Patient here for follow up.  EKG done today.   
contact me regarding any further issues related to the patient care    Orders Placed:  Orders Placed This Encounter   Procedures    EKG 12 Lead     Order Specific Question:   Reason for Exam?     Answer:   Other       Prescribed:  No orders of the defined types were placed in this encounter.         Discussed use, benefit, and side effects of prescribed medications. All patient questions answered. Pt voicedunderstanding. Instructed to continue current medications, diet and exercise. Continue risk factor modification and medical management. Patient agreed with treatment plan. Follow up as directed.    Electronically signedby Libia Arnold MD on 12/11/2024 at 10:01 AM

## 2025-06-23 RX ORDER — NITROGLYCERIN 0.4 MG/1
TABLET SUBLINGUAL
Qty: 25 TABLET | Refills: 1 | Status: SHIPPED | OUTPATIENT
Start: 2025-06-23

## 2025-06-23 NOTE — TELEPHONE ENCOUNTER
Vlad is requesting a refill of their   Requested Prescriptions     Pending Prescriptions Disp Refills    nitroGLYCERIN (NITROSTAT) 0.4 MG SL tablet 25 tablet 1     Sig: up to max of 3 total doses. If no relief after 1 dose, call 911.   . Please advise.      Last Appt:  Visit date not found  Next Appt:   12/17/25  Preferred pharmacy:     [facility] [address] [place] [address] [phone]  [phone]